# Patient Record
Sex: FEMALE | Race: WHITE | Employment: FULL TIME | URBAN - METROPOLITAN AREA
[De-identification: names, ages, dates, MRNs, and addresses within clinical notes are randomized per-mention and may not be internally consistent; named-entity substitution may affect disease eponyms.]

---

## 2017-02-09 ENCOUNTER — TELEPHONE (OUTPATIENT)
Dept: OBGYN | Facility: CLINIC | Age: 57
End: 2017-02-09

## 2017-02-09 ENCOUNTER — OFFICE VISIT (OUTPATIENT)
Dept: INTERNAL MEDICINE | Facility: CLINIC | Age: 57
End: 2017-02-09

## 2017-02-09 VITALS
BODY MASS INDEX: 37.81 KG/M2 | SYSTOLIC BLOOD PRESSURE: 125 MMHG | DIASTOLIC BLOOD PRESSURE: 81 MMHG | RESPIRATION RATE: 16 BRPM | WEIGHT: 213.4 LBS | HEART RATE: 72 BPM

## 2017-02-09 DIAGNOSIS — E03.4 HYPOTHYROIDISM DUE TO ACQUIRED ATROPHY OF THYROID: ICD-10-CM

## 2017-02-09 DIAGNOSIS — F43.21 ADJUSTMENT DISORDER WITH DEPRESSED MOOD: ICD-10-CM

## 2017-02-09 DIAGNOSIS — N95.2 VAGINAL ATROPHY: Primary | ICD-10-CM

## 2017-02-09 DIAGNOSIS — F41.0 PANIC: Primary | ICD-10-CM

## 2017-02-09 RX ORDER — ALPRAZOLAM 0.25 MG
0.25 TABLET ORAL 3 TIMES DAILY PRN
Qty: 10 TABLET | Refills: 0 | Status: SHIPPED | OUTPATIENT
Start: 2017-02-09 | End: 2018-04-05

## 2017-02-09 RX ORDER — LEVOTHYROXINE SODIUM 75 UG/1
75 TABLET ORAL DAILY
Qty: 90 TABLET | Refills: 3 | Status: SHIPPED | OUTPATIENT
Start: 2017-02-09 | End: 2017-02-16

## 2017-02-09 RX ORDER — SERTRALINE HYDROCHLORIDE 100 MG/1
100 TABLET, FILM COATED ORAL DAILY
Qty: 90 TABLET | Refills: 3 | Status: SHIPPED | OUTPATIENT
Start: 2017-02-09 | End: 2017-02-16

## 2017-02-09 ASSESSMENT — PAIN SCALES - GENERAL: PAINLEVEL: NO PAIN (0)

## 2017-02-09 NOTE — PROGRESS NOTES
Surgeon (please enter first and last name):  Dr Elkins  Fax number for Preop Evaluation:  [phone]  Location of Surgery: [facility]   Date of Surgery:  2/15/17  Procedure:  Left foot Surgery  History of reaction to anesthesia?  No

## 2017-02-09 NOTE — NURSING NOTE
Pre op physical report faxed to [facility] pre op fax line.Opal Patel LPN 10:37 AM on 2/9/2017

## 2017-02-09 NOTE — PATIENT INSTRUCTIONS
Primary Care Center Phone Number [phone]  Primary Care Center Medication Refill Request Information:  * Please contact your pharmacy regarding ANY request for medication refills.  ** [facility] Prescription Fax = [phone]  * Please allow 3 business days for routine medication refills.  * Please allow 5 business days for controlled substance medication refills.     Primary Care Center Test Result notification information:  *You will be notified with in 7-10 days of your appointment day regarding the results of your test.  If you are on MyChart you will be notified as soon as the provider has reviewed the results and signed off on them.

## 2017-02-09 NOTE — TELEPHONE ENCOUNTER
Telephone call from Sherrell requesting that when she saw Dr. Avalos last November she recommended that she start vaginal estrogen cream. She went to purchase this OTC but her insurance is needing a written prescription in order to cover this. She is requesting that Dr. Avalos order this and send it to [facility] on [facility], this message will be sent to Dr. Avlaos

## 2017-02-09 NOTE — MR AVS SNAPSHOT
After Visit Summary   2/9/2017    Sherrell Kothari    MRN: [id number]           Patient Information     Date Of Birth          1960        Visit Information        Provider Department      2/9/2017 8:25 AM Laurie Velasquez MD [facility]        Today's Diagnoses     Panic    -  1     Hypothyroidism due to acquired atrophy of thyroid         Adjustment disorder with depressed mood           Care Instructions    Primary Care Center Phone Number [phone]  Primary Care Center Medication Refill Request Information:  * Please contact your pharmacy regarding ANY request for medication refills.  ** [facility] Prescription Fax = [phone]  * Please allow 3 business days for routine medication refills.  * Please allow 5 business days for controlled substance medication refills.     Primary Care Center Test Result notification information:  *You will be notified with in 7-10 days of your appointment day regarding the results of your test.  If you are on MyChart you will be notified as soon as the provider has reviewed the results and signed off on them.                Follow-ups after your visit        Your next 10 appointments already scheduled     May 09, 2017  9:15 AM   (Arrive by 9:00 AM)   Return Visit with Sahil Ng MD   [facility] ([facility])    [address] [place] [id number] [phone]              Who to contact     Please call your clinic at [phone] to:    Ask questions about your health    Make or cancel appointments    Discuss your medicines    Learn about your test results    Speak to your doctor   If you have compliments or concerns about an experience at your clinic, or if you wish to file a complaint, please contact [facility] Patient Relations at [phone] or email us at [email or web address]         Additional Information About Your Visit         Orlebar Brownhart Information     [employer] gives you secure access to your electronic health record. If you see a primary care provider, you can also send messages to your care team and make appointments. If you have questions, please call your primary care clinic.  If you do not have a primary care provider, please call [phone] and they will assist you.      [employer] is an electronic gateway that provides easy, online access to your medical records. With [employer], you can request a clinic appointment, read your test results, renew a prescription or communicate with your care team.     To access your existing account, please contact your [facility] or call [phone] for assistance.        Care EveryWhere ID     This is your Care EveryWhere ID. This could be used by other organizations to access your [place] medical records  [id number]        Your Vitals Were     Pulse Respirations Last Period Breastfeeding?          72 16 04/01/2013 No         Blood Pressure from Last 3 Encounters:   02/09/17 125/81   11/29/16 127/84   10/22/16 141/94    Weight from Last 3 Encounters:   02/09/17 96.798 kg (213 lb 6.4 oz)   11/29/16 100.744 kg (222 lb 1.6 oz)   10/22/16 98.158 kg (216 lb 6.4 oz)              Today, you had the following     No orders found for display         Today's Medication Changes          These changes are accurate as of: 2/9/17  8:58 AM.  If you have any questions, ask your nurse or doctor.               Start taking these medicines.        Dose/Directions    ALPRAZolam 0.25 MG tablet   Commonly known as:  XANAX   Used for:  Panic   Started by:  Laurie Velasquez MD        Dose:  0.25 mg   Take 1 tablet (0.25 mg) by mouth 3 times daily as needed for anxiety   Quantity:  10 tablet   Refills:  0            Where to get your medicines      These medications were sent to [facility] [address]  [place] [address]     Phone:  [phone]    - levothyroxine 75 MCG tablet  - sertraline 100 MG tablet      Some of these will need a paper prescription and others can be bought over the counter.  Ask your nurse if you have questions.     Bring a paper prescription for each of these medications    - ALPRAZolam 0.25 MG tablet             Primary Care Provider Office Phone # Fax #    MANDA Ocasio [phone]       PRIMARY [facility]ARE CENTER [address] [place] [address] [address]  [facility] [address]        Thank you!     Thank you for choosing [facility]  for your care. Our goal is always to provide you with excellent care. Hearing back from our patients is one way we can continue to improve our services. Please take a few minutes to complete the written survey that you may receive in the mail after your visit with us. Thank you!             Your Updated Medication List - Protect others around you: Learn how to safely use, store and throw away your medicines at www.disposemymeds.org.          This list is accurate as of: 2/9/17  8:58 AM.  Always use your most recent med list.                   Brand Name Dispense Instructions for use    ALPRAZolam 0.25 MG tablet    XANAX    10 tablet    Take 1 tablet (0.25 mg) by mouth 3 times daily as needed for anxiety       fexofenadine 180 MG tablet    ALLEGRA     Take by mouth as needed       levothyroxine 75 MCG tablet    SYNTHROID/LEVOTHROID    90 tablet    Take 1 tablet (75 mcg) by mouth daily       MAGNESIUM PO      Take 1,000 mg by mouth daily       PRILOSEC OTC PO      Take by mouth as needed       rizatriptan 10 MG ODT tab    MAXALT-MLT    12 tablet    Take 1 tablet by mouth at onset of headache for migraine (Repeat at two hours if headache not entirely abated). May repeat dose in 2 hours.  Do not exceed 30 mg in 24 hours       sertraline 100 MG tablet    ZOLOFT    90 tablet    Take 1 tablet (100 mg) by mouth daily

## 2017-02-09 NOTE — NURSING NOTE
Chief Complaint   Patient presents with     Pre-Op Exam     pt is here for a pre op exam       Beverly Lutz CMA at 8:13 AM on 2/9/2017

## 2017-02-09 NOTE — PROGRESS NOTES
Sherrell Kothari is 56 year old female here at the request of Dr. Barroso for cardiovascular, pulmonary, and perioperative risk assessment prior to surgery.The intended surgical procedure is left foot surgery (hallux valgus repair). A copy of this note will be sent to the surgeon.    Past Medical History   Diagnosis Date     Diaphragmatic hernia without mention of obstruction or gangrene      hiatal hernia     Other forms of migraine, without mention of intractable migraine without mention of status migrainosus      intermittent, formerly treated with Zomig     Other chest pain 8/04     non-cardiac, cardiology,  nl stress test     Essential hypertension, benign      Mixed hyperlipidemia      Allergic rhinitis, cause unspecified      Allergic rhinitis     Unspecified hypothyroidism      Hypersomnia with sleep apnea, unspecified      CPAP     Depressive disorder, not elsewhere classified      Carcinoma in situ of skin of other and unspecified parts of face      squamous cell skin cancer upper lip     Helicobacter pylori (H. pylori)      TREATED     Syncope ER 12/09     NL echo, head CT     Temporomandibular disorder      Squamous cell carcinoma (H)      Mohs surgery      Past Surgical History   Procedure Laterality Date     Hc removal of tonsils,<10 y/o       Tonsils <11y.o.     Pelvis laparoscopy,dx  1996     Laparoscopy, benign fatty tumor LLQ     Surgical history of -   11/2005     Martin Blepharoplasty     Hc esophagoscopy, diagnostic       Hiatal Hernia     Surgical history of -   9/2006     Martin Breast Reduction     Hysteroscopy  5/09     with endometrial ablation     C echo heart xthoracic,complete, w/o doppler  12/09     EF 60%     Hc ct head wo contrast       Esophagoscopy, gastroscopy, duodenoscopy (egd), combined  5/23/2011     Procedure:COMBINED ESOPHAGOSCOPY, GASTROSCOPY, DUODENOSCOPY (EGD), BIOPSY SINGLE OR MULTIPLE; Surgeon:JOSE L FOOTE; Location: GI     Mohs micrographic procedure        Biopsy of skin lesion       Hc hysteroscopy, surgical; w/ endometrial ablation, any method N/A       Current Outpatient Prescriptions   Medication     ALPRAZolam (XANAX) 0.25 MG tablet     levothyroxine (SYNTHROID/LEVOTHROID) 75 MCG tablet     sertraline (ZOLOFT) 100 MG tablet     Omeprazole Magnesium (PRILOSEC OTC PO)     MAGNESIUM PO     fexofenadine (ALLEGRA) 180 MG tablet     rizatriptan (MAXALT-MLT) 10 MG disintegrating tablet     [DISCONTINUED] levothyroxine (SYNTHROID, LEVOTHROID) 75 MCG tablet     [DISCONTINUED] sertraline (ZOLOFT) 100 MG tablet     No current facility-administered medications for this visit.     Immunization History   Administered Date(s) Administered     DTAP (<7y) 10/08/1984, 09/09/1986     IPV 11/16/1987     Influenza (IIV3) 11/15/2005, 11/15/2007, 09/15/2014     TD (ADULT, 7+) 03/16/2005     TDAP (BOOSTRIX AGES 10-64) 04/02/2015       This is a LOW risk surgery.    HPI:   Reason for surgery: Abruptly started having left foot pain with activity and at rest in 12/2016. Pain is has been worsening. Notes that she wears comfortable shoes. Is she scheduled to have hallux valgus repair by a podiatrist.    Cardiovascular Risk:  She does not have any active cardiac conditions.  She does not have a history of known cardiac disease, prior MI, arrhythmia, CHF and hypertension and does not have a history of valvular disease.    This patient ambulates without assist without chest pain. She IS able to climb a flight of stairs without chest pain.    Pulmonary Risk:  In terms of risk factors for pulmonary complications, the patient does not have a history of CHF, COPD, age >60 years, being functionally dependent.      Is this patient going to undergo any of the following procedures that would put her at higher risk of postoperative pulmonary complications:  Prolonged surgery (>3 hours): No  Abdominal surgery/thoracic surgery/neurosurgery/head and neck: Yes   Vascular/aortic aneurysm repair: No  General  "anesthesia: Possibly    Perioperative Complications:  The patient does not have a history of bleeding or clotting problems in the past. The patient has not had complications from past surgeries.  The patient does not have a family history of any anesthesia or surgical complications.    Social History     Social History     Marital Status:      Spouse Name: Jaziel     Number of Children: 0     Years of Education: N/A     Occupational History     Ad. Ass[employer]            Social History Main Topics     Smoking status: Former Smoker -- 0.20 packs/day for 5 years     Types: Cigarettes     Smokeless tobacco: Never Used      Comment: Quit at age 17, smoked from age [age]     Alcohol Use: 0.0 oz/week     0 Standard drinks or equivalent per week      Comment: 1-2 drinks a week     Drug Use: No     Sexual Activity:     Partners: Male      Comment:  Jaziel     Other Topics Concern      Service No     Caffeine Concern No     Occupational Exposure No     Hobby Hazards No     Sleep Concern No     Stress Concern No     Weight Concern Yes     tries to \"eat right\", finds it hard with stress of job     Special Diet No     Back Care No     Exercise Yes     working on increasing aerobic activity, walks daily     Bike Helmet Yes     Seat Belt Yes     Self-Exams No     Social History Narrative     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        Health Care Maintenance:    Last Pap:2008 NIL    Vaccinations:up to date    TSH:1/2012 normal    Fasting glucose:elevated (104) in 2010    Lipids:1/2012 normal    Mammogram:4/2012 Bi-rads one    Colonoscopy:? Unsure, believes she is due    Dexa:n/a                            Family History   Problem Relation Age of Onset     C.A.D. Mother      mild heart attack 59     Colon Polyps Mother      Thyroid Disease Mother      DIABETES Maternal Grandmother      Cancer - colorectal Maternal Grandmother      Thyroid Disease Maternal Grandmother      Colon Cancer Maternal Grandmother      " Skin Cancer Maternal Grandmother      Alcohol/Drug Father      Blood Disease Maternal Grandfather       leukemia     Cancer - colorectal Brother 49          Crohn Disease No family hx of      Ulcerative Colitis No family hx of      Anesthesia Reaction No family hx of      Melanoma No family hx of      Myocardial Infarction Maternal Aunt 60     Myocardial Infarction Maternal Uncle 70     Colon Cancer Other      half brother        Review Of Systems  Constitutional: Negative  Eyes: negative  Ears/Nose/Throat: negative  Cardiovascular: negative  Respiratory: No shortness of breath, dyspnea on exertion, cough, or hemoptysis  Gastrointestinal: negative  Genitourinary: negative  Musculoskeletal: joint pain - left knee pain and left foot pain  Skin: negative  Neurologic: negative   Endocrine: thyroid disorder - on synthroid  Hematologic/Lymphatic/Immunologic: negative  Psychiatric: feeling anxious and anxiety, no SI or HI        OBJECTIVE:  /81 mmHg  Pulse 72  Resp 16  Wt 96.798 kg (213 lb 6.4 oz)  LMP 2013  Breastfeeding? No  Body mass index is 37.81 kg/(m^2).   Gen: Well-developed, well-nourished and in no apparent distress  HEENT:  Normocephalic, atraumatic, PERRL, no scleral icterus, no nasal discharge,  Nasal turbinates normal, OP nonerythematous, no oral lesions, MMM.  Cranial nerves II-XII grossly intact.  Neck: supple, no LAD, no thyromegaly  CV: regular rate and rhythm, normal S1 S2 and no murmur, click, or rub  Resp: clear to ausculation bilaterally, normal respiratory effort  Abd: bowel sounds normal, soft NT/ND,  no masses or hepatosplenomegaly  Ext: warm.  No LE edema.  Skin: dry and warm  Psych: normal mood/affect, appropriately oriented      A/P:    The patient with   Past Medical History   Diagnosis Date     Diaphragmatic hernia without mention of obstruction or gangrene      hiatal hernia     Other forms of migraine, without mention of intractable migraine without mention of  status migrainosus      intermittent, formerly treated with Zomig     Other chest pain 8/04     non-cardiac, cardiology,  nl stress test     Essential hypertension, benign      Mixed hyperlipidemia      Allergic rhinitis, cause unspecified      Allergic rhinitis     Unspecified hypothyroidism      Hypersomnia with sleep apnea, unspecified      CPAP     Depressive disorder, not elsewhere classified      Carcinoma in situ of skin of other and unspecified parts of face      squamous cell skin cancer upper lip     Helicobacter pylori (H. pylori)      TREATED     Syncope ER 12/09     NL echo, head CT     Temporomandibular disorder      Squamous cell carcinoma (H)      Mohs surgery    presents prior to surgery for assessment of perioperative risk. The patient is at LOW risk for cardiovascular complications and at LOW risk for pulmonary complications of this LOW risk surgery.  She does not require further testing.    --Approval given to proceed with proposed procedure, without further diagnostic evaluation  --Patient is to take all other scheduled medications on the day of surgery     No evidence of functionally compromising cardiac or pulmonary status  The patient is recommended to hold aspirin or NSAIDS for 10 days prior to surgery.  The patient is instructed as to which medications to take with sips of water the morning of surgery    GENERAL PREOP INSTRUCTIONS:  Proceed with surgery as planned.  Call surgeon if develops respiratory illness, fever, or other illness.  Letter sent to requesting surgeon listed above      Laboratory studies:  None  Pregnancy testing was not indicated.    Stress Echo 1/8/15:  No stress induced regional wall motion abnormalities.  Normal resting LV function with EF of approximately 60-65%; normal response   to exercise with increase to approximately 70-75%.  No ECG evidence of ischemia.  No subjective evidence of ischemia.  Normal functional capacity.  No significant valvular disease noted on  routine screening color flow Doppler   and pulsed Doppler examination.    Other orders:  Anxiety  -     Prescribed 10 tabs of ALPRAZolam (XANAX) 0.25 MG tablet; Take 1 tablet (0.25 mg) by mouth 3 times daily as needed for anxiety    Hypothyroidism due to acquired atrophy of thyroid  -     Refilled levothyroxine (SYNTHROID/LEVOTHROID) 75 MCG tablet; Take 1 tablet (75 mcg) by mouth daily    Adjustment disorder with depressed mood  -     Refilled sertraline (ZOLOFT) 100 MG tablet; Take 1 tablet (100 mg) by mouth daily      Patient was seen and discussed with Dr. Brumfield.    Please contact our office if there are any further questions or information required about this patient.    Laurie Velasquez MD  PGY-1 Internal Medicine  Pager: [phone]

## 2017-02-10 RX ORDER — ESTRADIOL 0.1 MG/G
2 CREAM VAGINAL
Qty: 42.5 G | Refills: 3 | Status: SHIPPED | OUTPATIENT
Start: 2017-02-13 | End: 2017-02-16

## 2017-02-11 NOTE — PROGRESS NOTES
Attestation:  I, Roya Snider, saw this patient with the resident and agree with the resident s findings and plan of care as documented in the resident s note.      Roya Snider MD

## 2017-02-13 ENCOUNTER — TELEPHONE (OUTPATIENT)
Dept: OBGYN | Facility: CLINIC | Age: 57
End: 2017-02-13

## 2017-02-13 DIAGNOSIS — N95.2 VAGINAL ATROPHY: ICD-10-CM

## 2017-02-16 ENCOUNTER — TELEPHONE (OUTPATIENT)
Dept: INTERNAL MEDICINE | Facility: CLINIC | Age: 57
End: 2017-02-16

## 2017-02-16 DIAGNOSIS — F43.21 ADJUSTMENT DISORDER WITH DEPRESSED MOOD: ICD-10-CM

## 2017-02-16 DIAGNOSIS — E03.4 HYPOTHYROIDISM DUE TO ACQUIRED ATROPHY OF THYROID: ICD-10-CM

## 2017-02-16 RX ORDER — ESTRADIOL 0.1 MG/G
2 CREAM VAGINAL
Qty: 42.5 G | Refills: 3 | Status: SHIPPED | OUTPATIENT
Start: 2017-02-16 | End: 2017-05-09

## 2017-02-16 RX ORDER — LEVOTHYROXINE SODIUM 75 UG/1
75 TABLET ORAL DAILY
Qty: 90 TABLET | Refills: 3 | Status: SHIPPED | OUTPATIENT
Start: 2017-02-16 | End: 2018-03-06

## 2017-02-16 RX ORDER — SERTRALINE HYDROCHLORIDE 100 MG/1
100 TABLET, FILM COATED ORAL DAILY
Qty: 90 TABLET | Refills: 3 | Status: SHIPPED | OUTPATIENT
Start: 2017-02-16 | End: 2018-03-06

## 2017-02-16 NOTE — TELEPHONE ENCOUNTER
Patient needs the prescriptions that were written last week for levothyroxine and sertraline sent to [facility] in [employer] on [address] in [place] for insurance reasons. Prescriptions sent per her request. LM at the [facility] cancelling the prescriptions that had been sent there.

## 2017-05-09 ENCOUNTER — OFFICE VISIT (OUTPATIENT)
Dept: DERMATOLOGY | Facility: CLINIC | Age: 57
End: 2017-05-09

## 2017-05-09 DIAGNOSIS — L82.0 SEBORRHEIC KERATOSES, INFLAMED: ICD-10-CM

## 2017-05-09 DIAGNOSIS — N95.2 VAGINAL ATROPHY: ICD-10-CM

## 2017-05-09 DIAGNOSIS — D48.5 NEOPLASM OF UNCERTAIN BEHAVIOR OF SKIN: Primary | ICD-10-CM

## 2017-05-09 DIAGNOSIS — Z85.89 HISTORY OF SQUAMOUS CELL CARCINOMA: ICD-10-CM

## 2017-05-09 RX ORDER — LIDOCAINE HYDROCHLORIDE AND EPINEPHRINE 10; 10 MG/ML; UG/ML
3 INJECTION, SOLUTION INFILTRATION; PERINEURAL ONCE
Qty: 3 ML | Refills: 0 | OUTPATIENT
Start: 2017-05-09 | End: 2017-05-09

## 2017-05-09 RX ORDER — ESTRADIOL 0.1 MG/G
2 CREAM VAGINAL
Qty: 42.5 G | Refills: 3 | Status: SHIPPED | OUTPATIENT
Start: 2017-05-11 | End: 2018-03-07

## 2017-05-09 ASSESSMENT — PAIN SCALES - GENERAL
PAINLEVEL: NO PAIN (0)
PAINLEVEL: NO PAIN (0)

## 2017-05-09 NOTE — NURSING NOTE
"Dermatology Rooming Note    Sherrell Kothari's goals for this visit include:   Chief Complaint   Patient presents with     Derm Problem     Sherrell is here today for a skin check.  States she has some new \"bumps\" on her back.         Amelia Terrazas, GRAHAM  "

## 2017-05-09 NOTE — TELEPHONE ENCOUNTER
Received refill request for estrace cream. Patient recently saw Dr. Avalos in November for annual. Able to send refill.

## 2017-05-09 NOTE — MR AVS SNAPSHOT
After Visit Summary   5/9/2017    Sherrell Kothari    MRN: [id number]           Patient Information     Date Of Birth          1960        Visit Information        Provider Department      5/9/2017 9:15 AM Sahil Ng MD [facility] Dermatology        Today's Diagnoses     Neoplasm of uncertain behavior of skin    -  1      Care Instructions    Cryotherapy    What is it?    Use of a very cold liquid, such as liquid nitrogen, to freeze and destroy abnormal skin cells that need to be removed    What should I expect?    Tenderness and redness    A small blister that might grow and fill with dark purple blood. There may be crusting.    More than one treatment may be needed if the lesions do not go away.    How do I care for the treated area?    Gently wash the area with your hands when bathing.    Use a thin layer of Vaseline to help with healing. You may use a Band-Aid.     The area should heal within 7-10 days and may leave behind a pink or lighter color.     Do not use an antibiotic or Neosporin ointment.     You may take acetaminophen (Tylenol) for pain.     Call your Doctor if you have:    Severe pain    Signs of infection (warmth, redness, cloudy yellow drainage, and or a bad smell)    Questions or concerns    Who should I call with questions?       [facility]: [phone]       [facility]: [phone]       For urgent needs outside of business hours call the [facility] at [phone]        and ask for the dermatology resident on call  Wound Care After a Biopsy    What is a skin biopsy?  A skin biopsy allows the doctor to examine a very small piece of tissue under the microscope to determine the diagnosis and the best treatment for the skin condition. A local anesthetic (numbing medicine)  is injected with a very small needle into the skin area to be tested. A small piece of skin is taken from the area.  Sometimes a suture (stitch) is used.     What are the risks of a skin biopsy?  I will experience scar, bleeding, swelling, pain, crusting and redness. I may experience incomplete removal or recurrence. Risks of this procedure are excessive bleeding, bruising, infection, nerve damage, numbness, thick (hypertrophic or keloidal) scar and non-diagnostic biopsy.    How should I care for my wound for the first 24 hours?    Keep the wound dry and covered for 24 hours    If it bleeds, hold direct pressure on the area for 15 minutes. If bleeding does not stop then go to the emergency room    Avoid strenuous exercise the first 1-2 days or as your doctor instructs you    How should I care for the wound after 24 hours?    After 24 hours, remove the bandage    You may bathe or shower as normal    If you had a scalp biopsy, you can shampoo as usual and can use shower water to clean the biopsy site daily    Clean the wound twice a day with gentle soap and water    Do not scrub, be gentle    Apply white petroleum/Vaseline after cleaning the wound with a cotton swab or a clean finger, and keep the site covered with a Bandaid /bandage. Bandages are not necessary with a scalp biopsy    If you are unable to cover the site with a Bandaid /bandage, re-apply ointment 2-3 times a day to keep the site moist. Moisture will help with healing    Avoid strenuous activity for first 1-2 days    Avoid lakes, rivers, pools, and oceans until the stitches are removed or the site is healed    How do I clean my wound?    Wash hands for 15 minutes with soap or use hand  before all wound care    Clean the wound with gentle soap and water    Apply white petroleum/Vaseline  to wound after it is clean    Replace the Bandaid /bandage to keep the wound covered for the first few days or as instructed by your doctor    If you had a scalp biopsy, warm shower water to the area on a daily basis should suffice    What should I use to clean my wound?      Cotton-tipped applicators (Qtips )    White petroleum jelly (Vaseline ). Use a clean new container and use Q-tips to apply.    Bandaids   as needed    Gentle soap     How should I care for my wound long term?    Do not get your wound dirty    Keep up with wound care for one week or until the area is healed.    A small scab will form and fall off by itself when the area is completely healed. The area will be red and will become pink in color as it heals. Sun protection is very important for how your scar will turn out. Sunscreen with an SPF 30 or greater is recommended once the area is healed.    If you have stitches, stitches need to be removed in  days. You may return to our clinic for this or you may have it done locally at your doctor s office.    You should have some soreness but it should be mild and slowly go away over several days. Talk to your doctor about using tylenol for pain,    When should I call my doctor?  If you have increased:     Pain or swelling    Pus or drainage (clear or slightly yellow drainage is ok)    Temperature over 100F    Spreading redness or warmth around wound    When will I hear about my results?  The biopsy results can take 2-3 weeks to come back. The clinic will call you with the results, send you a [employer] message, or have you schedule a follow-up clinic or phone time to discuss the results. Contact our clinics if you do not hear from us in 3 weeks.     Who should I call with questions?    [facility]: [phone]     [facility]: [phone]    For urgent needs outside of business hours call the [facility] at [phone] and ask for the dermatology resident on call            Follow-ups after your visit        Follow-up notes from your care team     Return in about 6 months (around 11/9/2017).      Who to contact     Please call your clinic at [phone] to:    Ask questions about your health    Make or  cancel appointments    Discuss your medicines    Learn about your test results    Speak to your doctor   If you have compliments or concerns about an experience at your clinic, or if you wish to file a complaint, please contact [facility] Patient Relations at [phone] or email us at [email or web address]         Additional Information About Your Visit        [employer] Information     [employer] gives you secure access to your electronic health record. If you see a primary care provider, you can also send messages to your care team and make appointments. If you have questions, please call your primary care clinic.  If you do not have a primary care provider, please call [phone] and they will assist you.      [employer] is an electronic gateway that provides easy, online access to your medical records. With [employer], you can request a clinic appointment, read your test results, renew a prescription or communicate with your care team.     To access your existing account, please contact your [facility] or call [phone] for assistance.        Care EveryWhere ID     This is your Care EveryWhere ID. This could be used by other organizations to access your [facility] medical records  [id number]        Your Vitals Were     Last Period                   04/01/2013            Blood Pressure from Last 3 Encounters:   02/09/17 125/81   11/29/16 127/84   10/22/16 (!) 141/94    Weight from Last 3 Encounters:   02/09/17 96.8 kg (213 lb 6.4 oz)   11/29/16 100.7 kg (222 lb 1.6 oz)   10/22/16 98.2 kg (216 lb 6.4 oz)              We Performed the Following     BIOPSY SKIN/SUBQ/MUC MEM, EACH ADDTL LESION     BIOPSY SKIN/SUBQ/MUC MEM, SINGLE LESION     Surgical pathology exam          Today's Medication Changes          These changes are accurate as of: 5/9/17 10:13 AM.  If you have any questions, ask your nurse or doctor.               Start taking these medicines.         Dose/Directions    lidocaine 1% with EPINEPHrine 1:100,000 1 %-1:622897 injection   Used for:  Neoplasm of uncertain behavior of skin   Started by:  Sahil Ng MD        Dose:  3 mL   Inject 3 mLs into the skin once for 1 dose   Quantity:  3 mL   Refills:  0            Where to get your medicines      Some of these will need a paper prescription and others can be bought over the counter.  Ask your nurse if you have questions.     You don't need a prescription for these medications     lidocaine 1% with EPINEPHrine 1:100,000 1 %-1:002661 injection                Primary Care Provider Office Phone # Fax #    MANDA Ocasio [phone]       [facility] [address]  [facility] [address]        Thank you!     Thank you for choosing [facility] DERMATOLOGY  for your care. Our goal is always to provide you with excellent care. Hearing back from our patients is one way we can continue to improve our services. Please take a few minutes to complete the written survey that you may receive in the mail after your visit with us. Thank you!             Your Updated Medication List - Protect others around you: Learn how to safely use, store and throw away your medicines at www.disposemymeds.org.          This list is accurate as of: 5/9/17 10:13 AM.  Always use your most recent med list.                   Brand Name Dispense Instructions for use    ALPRAZolam 0.25 MG tablet    XANAX    10 tablet    Take 1 tablet (0.25 mg) by mouth 3 times daily as needed for anxiety       CLARITIN PO      Take by mouth daily       estradiol 0.1 MG/GM cream    ESTRACE VAGINAL    42.5 g    Place 2 g vaginally twice a week       fexofenadine 180 MG tablet    ALLEGRA     Take by mouth as needed Reported on 5/9/2017       levothyroxine 75 MCG tablet    SYNTHROID/LEVOTHROID    90 tablet    Take 1 tablet (75 mcg) by mouth daily       lidocaine 1% with EPINEPHrine 1:100,000 1 %-1:391577 injection      3 mL    Inject 3 mLs into the skin once for 1 dose       MAGNESIUM PO      Take 1,000 mg by mouth daily       PRILOSEC OTC PO      Take by mouth as needed       rizatriptan 10 MG ODT tab    MAXALT-MLT    12 tablet    Take 1 tablet by mouth at onset of headache for migraine (Repeat at two hours if headache not entirely abated). May repeat dose in 2 hours.  Do not exceed 30 mg in 24 hours       sertraline 100 MG tablet    ZOLOFT    90 tablet    Take 1 tablet (100 mg) by mouth daily

## 2017-05-09 NOTE — PROGRESS NOTES
"[facility] Dermatology Note  May 9, 2017    Dermatology Problem List:  1. Dysplastic nevi  -s/p excision    2. SCC, lip  -Removed in mid-2000's    2. Irritated Seborrheic Keratosis  -Cryotherapy    4. Hypertrophic scars  -s/p laser    5. Xerosis cutis  -Amlactin    Encounter Date: May 9, 2017    CC:   Chief Complaint   Patient presents with     Derm Problem     Sherrell is here today for a skin check.  States she has some new \"bumps\" on her back.         History of Present Illness:  Ms. Sherrell Kothari is a 56 year old female who presents as a follow-up for a skin cancer exam. The patient was last seen in 11/2016 for a skin cancer exam.     Got back from a trip in [place] just on 5/7/17. All day sun exposure. Would place SPF 50 sun screen. She notes that since she was last seen in November, there is one irritated spot on her left shoulder. Feels itchy and irritated. No bleeding.     Otherwise, the pt denies any new or changing skin lesions, and denies any other itching, bleeding, or painful skin lesions. Otherwise the pt denies any other skin complaints today.    Past Medical History:   Patient Active Problem List   Diagnosis     Dyspnea and respiratory abnormality     Chest pain     Pure hypercholesterolemia     Chondromalacia of patella     Mixed hyperlipidemia     Allergic rhinitis     Other type of migraine     Tubulovillous adenoma colon      Dizziness and giddiness     Anxiety state     Major depressive disorder, recurrent episode, in partial or unspecified remission     Bruxism     Neoplasm of uncertain behavior of skin     Xerosis cutis     History of skin cancer     Hypothyroidism     Obesity     Abnormal glucose     Obstructive sleep apnea     Chronic nasal congestion     Hypothyroidism due to acquired atrophy of thyroid     Seborrheic keratoses, inflamed     Cherry angioma     Past Medical History:   Diagnosis Date     Allergic rhinitis, cause unspecified     Allergic rhinitis "     Carcinoma in situ of skin of other and unspecified parts of face     squamous cell skin cancer upper lip     Depressive disorder, not elsewhere classified      Diaphragmatic hernia without mention of obstruction or gangrene     hiatal hernia     Essential hypertension, benign      Helicobacter pylori (H. pylori)     TREATED     Hypersomnia with sleep apnea, unspecified     CPAP     Mixed hyperlipidemia      Other chest pain 8/04    non-cardiac, cardiology,  nl stress test     Other forms of migraine, without mention of intractable migraine without mention of status migrainosus     intermittent, formerly treated with Zomig     Squamous cell carcinoma (H)     Mohs surgery     Syncope ER 12/09    NL echo, head CT     Temporomandibular disorder      Unspecified hypothyroidism      Past Surgical History:   Procedure Laterality Date     BIOPSY OF SKIN LESION       C ECHO HEART XTHORACIC,COMPLETE, W/O DOPPLER  12/09    EF 60%     ESOPHAGOSCOPY, GASTROSCOPY, DUODENOSCOPY (EGD), COMBINED  5/23/2011    Procedure:COMBINED ESOPHAGOSCOPY, GASTROSCOPY, DUODENOSCOPY (EGD), BIOPSY SINGLE OR MULTIPLE; Surgeon:JOSE L FOOTE; Location:[facility]     [facility] CT HEAD WO CONTRAST       HC ESOPHAGOSCOPY, DIAGNOSTIC      Hiatal Hernia     HC HYSTEROSCOPY, SURGICAL; W/ ENDOMETRIAL ABLATION, ANY METHOD N/A      HC REMOVAL OF TONSILS,<12 Y/O      Tonsils <11y.o.     HYSTEROSCOPY  5/09    with endometrial ablation     MOHS MICROGRAPHIC PROCEDURE       PELVIS LAPAROSCOPY,DX  1996    Laparoscopy, benign fatty tumor LLQ     SURGICAL HISTORY OF -   11/2005    [facility] Blepharoplasty     SURGICAL HISTORY OF -   9/2006    [facility] Breast Reduction     Medications:  Current Outpatient Prescriptions   Medication Sig Dispense Refill     Loratadine (CLARITIN PO) Take by mouth daily       lidocaine 1% with EPINEPHrine 1:100,000 1 %-1:235184 injection Inject 3 mLs into the skin once for 1 dose 3 mL 0     levothyroxine (SYNTHROID/LEVOTHROID) 75 MCG tablet Take 1  tablet (75 mcg) by mouth daily 90 tablet 3     sertraline (ZOLOFT) 100 MG tablet Take 1 tablet (100 mg) by mouth daily 90 tablet 3     estradiol (ESTRACE VAGINAL) 0.1 MG/GM cream Place 2 g vaginally twice a week 42.5 g 3     Omeprazole Magnesium (PRILOSEC OTC PO) Take by mouth as needed        MAGNESIUM PO Take 1,000 mg by mouth daily       rizatriptan (MAXALT-MLT) 10 MG disintegrating tablet Take 1 tablet by mouth at onset of headache for migraine (Repeat at two hours if headache not entirely abated). May repeat dose in 2 hours.  Do not exceed 30 mg in 24 hours 12 tablet 6     ALPRAZolam (XANAX) 0.25 MG tablet Take 1 tablet (0.25 mg) by mouth 3 times daily as needed for anxiety (Patient not taking: Reported on 5/9/2017) 10 tablet 0     fexofenadine (ALLEGRA) 180 MG tablet Take by mouth as needed Reported on 5/9/2017        Allergies   Allergen Reactions     Latex Swelling and Rash     Issues with Latex Condoms in the past     Dust Mites      Mold      Pollen Extract      Sulfa Drugs      Rash, hives       Review of Systems:  -As per HPI  -Constitutional: The patient denies fatigue, fevers, chills, unintended weight loss, and night sweats.  -HEENT: Patient denies non-healing oral sores.  -Skin: As above in HPI. No additional skin concerns.    Physical exam:  GEN: This is a well developed, well-nourished female in no acute distress, in a pleasant mood.    SKIN: Total skin excluding the undergarment areas was performed. The exam included the head/face, neck, both arms, chest, back, abdomen, both legs, digits and/or nails.   -There are waxy stuck on tan to brown papules on the chest, back and legs, one of which on the left shoulder was irritated (these represent the lesions the pt was concerned about).  -New raised red lesion on upper right breast.  -There is xerosis of the arms and lower legs.   -There are bright red dome shaped papules scattered on the back and chest with some adjacent telangiectasias, two of which  on the L shoulder and midback demonstrate erythematous halos and coarse vessels on dermoscopy  -hyper pigmented lesion between second and third toe on right foot.  -No other lesions of concern on areas examined.     Impression/Plan:  1. Seborrheic keratosis, inflamed  - Cryotherapy procedure note: After verbal consent and discussion of risks and benefits including but no limited to dyspigmentation/scar, blister, and pain, 1 was treated with 1-2mm freeze border for 9-10 cycles with liquid nitrogen (right breast). Post cryotherapy instructions were provided.     2. Neoplasms of uncertain behavior on left shoulder and midback: BCCs vs inflamed SKs x2.  - Shave Biopsy procedure Note: Procedure Note: Biopsy by shave technique  The risks and benefits of the procedure were described to the patient. These include but are not limited to bleeding, infection, scar, incomplete removal, and non-diagnostic biopsy. Written informed consent was obtained. The left arm, mid-back lesions were cleansed with an alcohol pad and injected with 1% lidocaine with epinephrine buffered with sodium bicarbonate. Once anesthesia was obtained, a biopsy was performed with Gilette blade. The tissue was placed in a labeled container with formalin and sent to pathology. Hemostasis was achieved with aluminum chloride. Vaseline and a bandage were applied to the wound. The patient tolerated the procedure well and was given post biopsy care instructions.    2. Xerosis cutis  - Eucerin or Aquaphor as needed    3. Cherry angioma(s)  - Benign nature was discussed. No further intervention required at this time.     4.   Polymorphous Light Eruption: recent sun exposure. Noted to have erythematous clusters of papules on arms that the patient reports is now resolving.  - will reexamine in 6 months to determine if these resolve  - educated on responsible sun exposure.    5. History of SCC - clinically JOSEPH today.  Reassurance; sun protective measures  discussed.    Follow-up in 6 months, earlier for new or changing lesions.     Peter Garcia MD  Medicine-Pediatrics PGY2    I have seen and examined this patient and agree with the assessment and plan as documented in the resident's note, and was present for all pertinent procedures.    Sahil Ng MD  Dermatology Attending

## 2017-05-09 NOTE — PATIENT INSTRUCTIONS
Cryotherapy    What is it?    Use of a very cold liquid, such as liquid nitrogen, to freeze and destroy abnormal skin cells that need to be removed    What should I expect?    Tenderness and redness    A small blister that might grow and fill with dark purple blood. There may be crusting.    More than one treatment may be needed if the lesions do not go away.    How do I care for the treated area?    Gently wash the area with your hands when bathing.    Use a thin layer of Vaseline to help with healing. You may use a Band-Aid.     The area should heal within 7-10 days and may leave behind a pink or lighter color.     Do not use an antibiotic or Neosporin ointment.     You may take acetaminophen (Tylenol) for pain.     Call your Doctor if you have:    Severe pain    Signs of infection (warmth, redness, cloudy yellow drainage, and or a bad smell)    Questions or concerns    Who should I call with questions?       [facility]: [phone]       [facility]: [phone]       For urgent needs outside of business hours call the [facility] at [phone]        and ask for the dermatology resident on call  Wound Care After a Biopsy    What is a skin biopsy?  A skin biopsy allows the doctor to examine a very small piece of tissue under the microscope to determine the diagnosis and the best treatment for the skin condition. A local anesthetic (numbing medicine)  is injected with a very small needle into the skin area to be tested. A small piece of skin is taken from the area. Sometimes a suture (stitch) is used.     What are the risks of a skin biopsy?  I will experience scar, bleeding, swelling, pain, crusting and redness. I may experience incomplete removal or recurrence. Risks of this procedure are excessive bleeding, bruising, infection, nerve damage, numbness, thick (hypertrophic or keloidal) scar and non-diagnostic biopsy.    How should I care for  my wound for the first 24 hours?    Keep the wound dry and covered for 24 hours    If it bleeds, hold direct pressure on the area for 15 minutes. If bleeding does not stop then go to the emergency room    Avoid strenuous exercise the first 1-2 days or as your doctor instructs you    How should I care for the wound after 24 hours?    After 24 hours, remove the bandage    You may bathe or shower as normal    If you had a scalp biopsy, you can shampoo as usual and can use shower water to clean the biopsy site daily    Clean the wound twice a day with gentle soap and water    Do not scrub, be gentle    Apply white petroleum/Vaseline after cleaning the wound with a cotton swab or a clean finger, and keep the site covered with a Bandaid /bandage. Bandages are not necessary with a scalp biopsy    If you are unable to cover the site with a Bandaid /bandage, re-apply ointment 2-3 times a day to keep the site moist. Moisture will help with healing    Avoid strenuous activity for first 1-2 days    Avoid lakes, rivers, pools, and oceans until the stitches are removed or the site is healed    How do I clean my wound?    Wash hands for 15 minutes with soap or use hand  before all wound care    Clean the wound with gentle soap and water    Apply white petroleum/Vaseline  to wound after it is clean    Replace the Bandaid /bandage to keep the wound covered for the first few days or as instructed by your doctor    If you had a scalp biopsy, warm shower water to the area on a daily basis should suffice    What should I use to clean my wound?     Cotton-tipped applicators (Qtips )    White petroleum jelly (Vaseline ). Use a clean new container and use Q-tips to apply.    Bandaids   as needed    Gentle soap     How should I care for my wound long term?    Do not get your wound dirty    Keep up with wound care for one week or until the area is healed.    A small scab will form and fall off by itself when the area is completely  healed. The area will be red and will become pink in color as it heals. Sun protection is very important for how your scar will turn out. Sunscreen with an SPF 30 or greater is recommended once the area is healed.    If you have stitches, stitches need to be removed in  days. You may return to our clinic for this or you may have it done locally at your doctor s office.    You should have some soreness but it should be mild and slowly go away over several days. Talk to your doctor about using tylenol for pain,    When should I call my doctor?  If you have increased:     Pain or swelling    Pus or drainage (clear or slightly yellow drainage is ok)    Temperature over 100F    Spreading redness or warmth around wound    When will I hear about my results?  The biopsy results can take 2-3 weeks to come back. The clinic will call you with the results, send you a [employer] message, or have you schedule a follow-up clinic or phone time to discuss the results. Contact our clinics if you do not hear from us in 3 weeks.     Who should I call with questions?    [facility]: [phone]     [facility]: [phone]    For urgent needs outside of business hours call the [facility] at [phone] and ask for the dermatology resident on call

## 2017-05-09 NOTE — LETTER
"5/9/2017       RE: Sherrell Kothari  [address] [facility] [address]     Dear Colleague,    Thank you for referring your patient, Sherrell Kothari, to the [facility] DERMATOLOGY at [facility]. Please see a copy of my visit note below.                  Pictures were placed in Pt's chart today for future reference.      [facility] Dermatology Note  May 9, 2017    Dermatology Problem List:  1. Dysplastic nevi  -s/p excision    2. SCC, lip  -Removed in mid-2000's    2. Irritated Seborrheic Keratosis  -Cryotherapy    4. Hypertrophic scars  -s/p laser    5. Xerosis cutis  -Amlactin    Encounter Date: May 9, 2017    CC:   Chief Complaint   Patient presents with     Derm Problem     Sherrell is here today for a skin check.  States she has some new \"bumps\" on her back.         History of Present Illness:  Ms. Sherrell Kothari is a 56 year old female who presents as a follow-up for a skin cancer exam. The patient was last seen in 11/2016 for a skin cancer exam.     Got back from a trip in [place] just on 5/7/17. All day sun exposure. Would place SPF 50 sun screen. She notes that since she was last seen in November, there is one irritated spot on her left shoulder. Feels itchy and irritated. No bleeding.     Otherwise, the pt denies any new or changing skin lesions, and denies any other itching, bleeding, or painful skin lesions. Otherwise the pt denies any other skin complaints today.    Past Medical History:   Patient Active Problem List   Diagnosis     Dyspnea and respiratory abnormality     Chest pain     Pure hypercholesterolemia     Chondromalacia of patella     Mixed hyperlipidemia     Allergic rhinitis     Other type of migraine     Tubulovillous adenoma colon      Dizziness and giddiness     Anxiety state     Major depressive disorder, recurrent episode, in partial or unspecified remission     Bruxism     Neoplasm of uncertain " behavior of skin     Xerosis cutis     History of skin cancer     Hypothyroidism     Obesity     Abnormal glucose     Obstructive sleep apnea     Chronic nasal congestion     Hypothyroidism due to acquired atrophy of thyroid     Seborrheic keratoses, inflamed     Cherry angioma     Past Medical History:   Diagnosis Date     Allergic rhinitis, cause unspecified     Allergic rhinitis     Carcinoma in situ of skin of other and unspecified parts of face     squamous cell skin cancer upper lip     Depressive disorder, not elsewhere classified      Diaphragmatic hernia without mention of obstruction or gangrene     hiatal hernia     Essential hypertension, benign      Helicobacter pylori (H. pylori)     TREATED     Hypersomnia with sleep apnea, unspecified     CPAP     Mixed hyperlipidemia      Other chest pain 8/04    non-cardiac, cardiology,  nl stress test     Other forms of migraine, without mention of intractable migraine without mention of status migrainosus     intermittent, formerly treated with Zomig     Squamous cell carcinoma (H)     Mohs surgery     Syncope ER 12/09    NL echo, head CT     Temporomandibular disorder      Unspecified hypothyroidism      Past Surgical History:   Procedure Laterality Date     BIOPSY OF SKIN LESION       C ECHO HEART XTHORACIC,COMPLETE, W/O DOPPLER  12/09    EF 60%     ESOPHAGOSCOPY, GASTROSCOPY, DUODENOSCOPY (EGD), COMBINED  5/23/2011    Procedure:COMBINED ESOPHAGOSCOPY, GASTROSCOPY, DUODENOSCOPY (EGD), BIOPSY SINGLE OR MULTIPLE; Surgeon:JOSE L FOOTE; Location:[facility]     HC CT HEAD WO CONTRAST       HC ESOPHAGOSCOPY, DIAGNOSTIC      Hiatal Hernia     HC HYSTEROSCOPY, SURGICAL; W/ ENDOMETRIAL ABLATION, ANY METHOD N/A      HC REMOVAL OF TONSILS,<12 Y/O      Tonsils <11y.o.     HYSTEROSCOPY  5/09    with endometrial ablation     MOHS MICROGRAPHIC PROCEDURE       PELVIS LAPAROSCOPY,DX  1996    Laparoscopy, benign fatty tumor LLQ     SURGICAL HISTORY OF -   11/2005    [facility]  Blepharoplasty     SURGICAL HISTORY OF -   9/2006    [facility] Breast Reduction     Medications:  Current Outpatient Prescriptions   Medication Sig Dispense Refill     Loratadine (CLARITIN PO) Take by mouth daily       lidocaine 1% with EPINEPHrine 1:100,000 1 %-1:207189 injection Inject 3 mLs into the skin once for 1 dose 3 mL 0     levothyroxine (SYNTHROID/LEVOTHROID) 75 MCG tablet Take 1 tablet (75 mcg) by mouth daily 90 tablet 3     sertraline (ZOLOFT) 100 MG tablet Take 1 tablet (100 mg) by mouth daily 90 tablet 3     estradiol (ESTRACE VAGINAL) 0.1 MG/GM cream Place 2 g vaginally twice a week 42.5 g 3     Omeprazole Magnesium (PRILOSEC OTC PO) Take by mouth as needed        MAGNESIUM PO Take 1,000 mg by mouth daily       rizatriptan (MAXALT-MLT) 10 MG disintegrating tablet Take 1 tablet by mouth at onset of headache for migraine (Repeat at two hours if headache not entirely abated). May repeat dose in 2 hours.  Do not exceed 30 mg in 24 hours 12 tablet 6     ALPRAZolam (XANAX) 0.25 MG tablet Take 1 tablet (0.25 mg) by mouth 3 times daily as needed for anxiety (Patient not taking: Reported on 5/9/2017) 10 tablet 0     fexofenadine (ALLEGRA) 180 MG tablet Take by mouth as needed Reported on 5/9/2017        Allergies   Allergen Reactions     Latex Swelling and Rash     Issues with Latex Condoms in the past     Dust Mites      Mold      Pollen Extract      Sulfa Drugs      Rash, hives       Review of Systems:  -As per HPI  -Constitutional: The patient denies fatigue, fevers, chills, unintended weight loss, and night sweats.  -HEENT: Patient denies non-healing oral sores.  -Skin: As above in HPI. No additional skin concerns.    Physical exam:  GEN: This is a well developed, well-nourished female in no acute distress, in a pleasant mood.    SKIN: Total skin excluding the undergarment areas was performed. The exam included the head/face, neck, both arms, chest, back, abdomen, both legs, digits and/or nails.   -There are  waxy stuck on tan to brown papules on the chest, back and legs, one of which on the left shoulder was irritated (these represent the lesions the pt was concerned about).  -New raised red lesion on upper right breast.  -There is xerosis of the arms and lower legs.   -There are bright red dome shaped papules scattered on the back and chest with some adjacent telangiectasias, two of which on the L shoulder and midback demonstrate erythematous halos and coarse vessels on dermoscopy  -hyper pigmented lesion between second and third toe on right foot.  -No other lesions of concern on areas examined.     Impression/Plan:  1. Seborrheic keratosis, inflamed  - Cryotherapy procedure note: After verbal consent and discussion of risks and benefits including but no limited to dyspigmentation/scar, blister, and pain, 1 was treated with 1-2mm freeze border for 9-10 cycles with liquid nitrogen (right breast). Post cryotherapy instructions were provided.     2. Neoplasms of uncertain behavior on left shoulder and midback: BCCs vs inflamed SKs x2.  - Shave Biopsy procedure Note: Procedure Note: Biopsy by shave technique  The risks and benefits of the procedure were described to the patient. These include but are not limited to bleeding, infection, scar, incomplete removal, and non-diagnostic biopsy. Written informed consent was obtained. The left arm, mid-back lesions were cleansed with an alcohol pad and injected with 1% lidocaine with epinephrine buffered with sodium bicarbonate. Once anesthesia was obtained, a biopsy was performed with Gilette blade. The tissue was placed in a labeled container with formalin and sent to pathology. Hemostasis was achieved with aluminum chloride. Vaseline and a bandage were applied to the wound. The patient tolerated the procedure well and was given post biopsy care instructions.    2. Xerosis cutis  - Eucerin or Aquaphor as needed    3. Cherry angioma(s)  - Benign nature was discussed. No further  intervention required at this time.     4.   Polymorphous Light Eruption: recent sun exposure. Noted to have erythematous clusters of papules on arms that the patient reports is now resolving.  - will reexamine in 6 months to determine if these resolve  - educated on responsible sun exposure.    5. History of SCC - clinically JOSEPH today.  Reassurance; sun protective measures discussed.    Follow-up in 6 months, earlier for new or changing lesions.     Peter Garcia MD  Medicine-Pediatrics PGY2    I have seen and examined this patient and agree with the assessment and plan as documented in the resident's note, and was present for all pertinent procedures.    Sahil Ng MD  Dermatology Attending

## 2017-05-11 LAB — COPATH REPORT: NORMAL

## 2017-05-18 PROBLEM — Z85.89 HISTORY OF SQUAMOUS CELL CARCINOMA: Status: ACTIVE | Noted: 2017-05-18

## 2017-07-15 ENCOUNTER — HEALTH MAINTENANCE LETTER (OUTPATIENT)
Age: 57
End: 2017-07-15

## 2018-03-06 ENCOUNTER — TELEPHONE (OUTPATIENT)
Dept: INTERNAL MEDICINE | Facility: CLINIC | Age: 58
End: 2018-03-06

## 2018-03-06 DIAGNOSIS — E03.4 HYPOTHYROIDISM DUE TO ACQUIRED ATROPHY OF THYROID: ICD-10-CM

## 2018-03-06 DIAGNOSIS — N95.2 VAGINAL ATROPHY: ICD-10-CM

## 2018-03-06 DIAGNOSIS — F43.21 ADJUSTMENT DISORDER WITH DEPRESSED MOOD: ICD-10-CM

## 2018-03-06 NOTE — TELEPHONE ENCOUNTER
----- Message from Berna Cordova RN sent at 3/6/2018 11:45 AM CST -----  Regarding: Appointment PCP [place]  Please call to schedule.    Patient is overdue for annual clinic visit - unless otherwise indicated patient needs to be scheduled with 1st available.    Patient may need labs and or imaging - please check with RN care coordinator.    Thanks    I do not need any follow up on the scheduling of this appointment unless the patient will no longer be receiving care in our clinic.

## 2018-03-06 NOTE — TELEPHONE ENCOUNTER
levothyroxine (SYNTHROID/LEVOTHROID) 75 MCG tablet    Last Written Prescription Date:  2/16/17  Last Fill Quantity: 90,   # refills: 3    sertraline (ZOLOFT) 100 MG tablet      Last Written Prescription Date:  2/16/17  Last Fill Quantity: 90,   # refills: 3    Last Office Visit : 2/9/17  Future Office visit:  none    Routing refill request to provider for review/approval because:  Test/lab overdue: PHQ-9- last done 2/26/16. TSH 11/15/16  *Appointment due- scheduling contacted.

## 2018-03-07 RX ORDER — SERTRALINE HYDROCHLORIDE 100 MG/1
100 TABLET, FILM COATED ORAL DAILY
Qty: 90 TABLET | Refills: 0 | Status: SHIPPED | OUTPATIENT
Start: 2018-03-07 | End: 2018-04-05

## 2018-03-07 RX ORDER — ESTRADIOL 0.1 MG/G
2 CREAM VAGINAL
Qty: 42.5 G | Refills: 0 | Status: SHIPPED | OUTPATIENT
Start: 2018-03-08 | End: 2018-10-18

## 2018-03-07 RX ORDER — LEVOTHYROXINE SODIUM 75 UG/1
75 TABLET ORAL DAILY
Qty: 90 TABLET | Refills: 0 | Status: SHIPPED | OUTPATIENT
Start: 2018-03-07 | End: 2018-06-04

## 2018-03-07 NOTE — TELEPHONE ENCOUNTER
----- Message from Roxi Menendez sent at 3/7/2018  8:56 AM CST -----  Regarding: medication refills  Contact: [phone]  Pt calling for her medication refills on levothyroxine (SYNTHROID/LEVOTHROID) 75 MCG tablet and sertraline (ZOLOFT) 100 MG tablet and estradiol (ESTRACE VAGINAL) 0.1 MG/GM cream . She has an upcoming appt on April 5 with Mabel. [id number] [place] [address]. I told her to call her pharmacy as well    Thanks,  Roxi  Call ctr  Please DO NOT send message and or reply back to sender. Call center Representatives DO NOT respond to Messages

## 2018-03-23 ENCOUNTER — MEDICAL CORRESPONDENCE (OUTPATIENT)
Dept: HEALTH INFORMATION MANAGEMENT | Facility: CLINIC | Age: 58
End: 2018-03-23

## 2018-04-03 ENCOUNTER — MEDICAL CORRESPONDENCE (OUTPATIENT)
Dept: HEALTH INFORMATION MANAGEMENT | Facility: CLINIC | Age: 58
End: 2018-04-03

## 2018-04-05 ENCOUNTER — OFFICE VISIT (OUTPATIENT)
Dept: INTERNAL MEDICINE | Facility: CLINIC | Age: 58
End: 2018-04-05
Payer: COMMERCIAL

## 2018-04-05 ENCOUNTER — TELEPHONE (OUTPATIENT)
Dept: GASTROENTEROLOGY | Facility: CLINIC | Age: 58
End: 2018-04-05

## 2018-04-05 VITALS
HEIGHT: 63 IN | BODY MASS INDEX: 39.51 KG/M2 | DIASTOLIC BLOOD PRESSURE: 98 MMHG | WEIGHT: 223 LBS | SYSTOLIC BLOOD PRESSURE: 157 MMHG | HEART RATE: 90 BPM

## 2018-04-05 DIAGNOSIS — G47.33 OSA (OBSTRUCTIVE SLEEP APNEA): ICD-10-CM

## 2018-04-05 DIAGNOSIS — E78.00 PURE HYPERCHOLESTEROLEMIA: ICD-10-CM

## 2018-04-05 DIAGNOSIS — Z12.31 VISIT FOR SCREENING MAMMOGRAM: Primary | ICD-10-CM

## 2018-04-05 DIAGNOSIS — F41.0 PANIC: ICD-10-CM

## 2018-04-05 DIAGNOSIS — D36.9 TUBULOVILLOUS ADENOMA: ICD-10-CM

## 2018-04-05 DIAGNOSIS — F43.23 ADJUSTMENT DISORDER WITH MIXED ANXIETY AND DEPRESSED MOOD: ICD-10-CM

## 2018-04-05 DIAGNOSIS — I10 ESSENTIAL HYPERTENSION: ICD-10-CM

## 2018-04-05 DIAGNOSIS — Z12.11 SPECIAL SCREENING FOR MALIGNANT NEOPLASMS, COLON: ICD-10-CM

## 2018-04-05 LAB
ALBUMIN SERPL-MCNC: 4 G/DL (ref 3.4–5)
ALBUMIN UR-MCNC: NEGATIVE MG/DL
ALP SERPL-CCNC: 57 U/L (ref 40–150)
ALT SERPL W P-5'-P-CCNC: 49 U/L (ref 0–50)
ANION GAP SERPL CALCULATED.3IONS-SCNC: 7 MMOL/L (ref 3–14)
APPEARANCE UR: CLEAR
AST SERPL W P-5'-P-CCNC: 29 U/L (ref 0–45)
BILIRUB SERPL-MCNC: 0.4 MG/DL (ref 0.2–1.3)
BILIRUB UR QL STRIP: NEGATIVE
BUN SERPL-MCNC: 20 MG/DL (ref 7–30)
CALCIUM SERPL-MCNC: 9.4 MG/DL (ref 8.5–10.1)
CHLORIDE SERPL-SCNC: 107 MMOL/L (ref 94–109)
CO2 SERPL-SCNC: 25 MMOL/L (ref 20–32)
COLOR UR AUTO: YELLOW
CREAT SERPL-MCNC: 0.99 MG/DL (ref 0.52–1.04)
ERYTHROCYTE [DISTWIDTH] IN BLOOD BY AUTOMATED COUNT: 13.1 % (ref 10–15)
GFR SERPL CREATININE-BSD FRML MDRD: 58 ML/MIN/1.7M2
GLUCOSE SERPL-MCNC: 102 MG/DL (ref 70–99)
GLUCOSE UR STRIP-MCNC: NEGATIVE MG/DL
HBA1C MFR BLD: 6.2 % (ref 0–6.4)
HCT VFR BLD AUTO: 43.4 % (ref 35–47)
HGB BLD-MCNC: 14.6 G/DL (ref 11.7–15.7)
HGB UR QL STRIP: NEGATIVE
KETONES UR STRIP-MCNC: NEGATIVE MG/DL
LEUKOCYTE ESTERASE UR QL STRIP: NEGATIVE
MCH RBC QN AUTO: 30.8 PG (ref 26.5–33)
MCHC RBC AUTO-ENTMCNC: 33.6 G/DL (ref 31.5–36.5)
MCV RBC AUTO: 92 FL (ref 78–100)
MUCOUS THREADS #/AREA URNS LPF: PRESENT /LPF
NITRATE UR QL: NEGATIVE
PH UR STRIP: 5 PH (ref 5–7)
PLATELET # BLD AUTO: 267 10E9/L (ref 150–450)
POTASSIUM SERPL-SCNC: 4.2 MMOL/L (ref 3.4–5.3)
PROT SERPL-MCNC: 8.1 G/DL (ref 6.8–8.8)
RBC # BLD AUTO: 4.74 10E12/L (ref 3.8–5.2)
RBC #/AREA URNS AUTO: 1 /HPF (ref 0–2)
SODIUM SERPL-SCNC: 138 MMOL/L (ref 133–144)
SOURCE: ABNORMAL
SP GR UR STRIP: 1.01 (ref 1–1.03)
SQUAMOUS #/AREA URNS AUTO: 1 /HPF (ref 0–1)
TSH SERPL DL<=0.005 MIU/L-ACNC: 2.95 MU/L (ref 0.4–4)
UROBILINOGEN UR STRIP-MCNC: 0 MG/DL (ref 0–2)
WBC # BLD AUTO: 7.9 10E9/L (ref 4–11)
WBC #/AREA URNS AUTO: <1 /HPF (ref 0–5)

## 2018-04-05 RX ORDER — VALSARTAN 160 MG/1
160 TABLET ORAL DAILY
Qty: 90 TABLET | Refills: 3 | Status: SHIPPED | OUTPATIENT
Start: 2018-04-05 | End: 2019-03-26

## 2018-04-05 RX ORDER — ALPRAZOLAM 0.25 MG
0.25 TABLET ORAL 3 TIMES DAILY PRN
Qty: 20 TABLET | Refills: 0 | Status: SHIPPED | OUTPATIENT
Start: 2018-04-05 | End: 2022-06-28

## 2018-04-05 ASSESSMENT — ANXIETY QUESTIONNAIRES
1. FEELING NERVOUS, ANXIOUS, OR ON EDGE: SEVERAL DAYS
GAD7 TOTAL SCORE: 9
2. NOT BEING ABLE TO STOP OR CONTROL WORRYING: MORE THAN HALF THE DAYS
3. WORRYING TOO MUCH ABOUT DIFFERENT THINGS: MORE THAN HALF THE DAYS
6. BECOMING EASILY ANNOYED OR IRRITABLE: SEVERAL DAYS
5. BEING SO RESTLESS THAT IT IS HARD TO SIT STILL: SEVERAL DAYS
7. FEELING AFRAID AS IF SOMETHING AWFUL MIGHT HAPPEN: SEVERAL DAYS

## 2018-04-05 ASSESSMENT — PATIENT HEALTH QUESTIONNAIRE - PHQ9: 5. POOR APPETITE OR OVEREATING: SEVERAL DAYS

## 2018-04-05 ASSESSMENT — PAIN SCALES - GENERAL: PAINLEVEL: NO PAIN (0)

## 2018-04-05 NOTE — PATIENT INSTRUCTIONS
[facility] [phone]    Please go to the lab on the first floor before you leave today.

## 2018-04-05 NOTE — NURSING NOTE
Chief Complaint   Patient presents with     Physical     Refill Request     Orders     Cpap     Rooming Note  Health Maintenance   Health Maintenance Due   Topic Date Due     MIGRAINE ACTION PLAN  12/14/1977     DEPRESSION ACTION PLAN Q1 YR  12/14/1977     ADVANCE DIRECTIVE PLANNING Q5 YRS  12/14/2014     PHQ-9 Q6 MONTHS  08/26/2016     MAMMO Q1 YR  05/10/2017     COLONOSCOPY Q3 YR  04/29/2018    Health maintenance items discussed and ordered/forwarded to PCP  Blood Pressure   BP Readings from Last 1 Encounters:   04/05/18 (!) 133/91    Single BP recheck started, 12:25 PM (2 minutes)

## 2018-04-05 NOTE — MR AVS SNAPSHOT
After Visit Summary   4/5/2018    Sherrell Kothari    MRN: [id number]           Patient Information     Date Of Birth          1960        Visit Information        Provider Department      4/5/2018 12:40 PM Mabel Sandra, APRN CNP [facility]        Today's Diagnoses     Visit for screening mammogram    -  1    Special screening for malignant neoplasms, colon        Adjustment disorder with mixed anxiety and depressed mood        Pure hypercholesterolemia        Tubulovillous adenoma colon         LORELEI (obstructive sleep apnea)        Essential hypertension          Care Instructions    Primary Care Clinic [phone]    Please go to the lab on the first floor before you leave today.             Follow-ups after your visit        Additional Services     GASTROENTEROLOGY ADULT REF PROCEDURE ONLY       Last Lab Result: Creatinine (mg/dL)       Date                     Value                 06/21/2016               1.12 (H)         ----------  Body mass index is 39.5 kg/(m^2).     Needed:  No  Language:  English    Patient will be contacted to schedule procedure.     Please be aware that coverage of these services is subject to the terms and limitations of your health insurance plan.  Call member services at your health plan with any benefit or coverage questions.  Any procedures must be performed at a [facility] facility OR coordinated by your clinic's referral office.    Please bring the following with you to your appointment:    (1) Any X-Rays, CTs or MRIs which have been performed.  Contact the facility where they were done to arrange for  prior to your scheduled appointment.    (2) List of current medications   (3) This referral request   (4) Any documents/labs given to you for this referral                  Follow-up notes from your care team     Return in about 4 weeks (around 5/3/2018).      Your next 10 appointments already scheduled     Apr 05,  "2018  2:15 PM CDT   LAB with  LAB   [facility] Lab ([facility])    [address] [place] [id number] [phone]           Please do not eat 10-12 hours before your appointment if you are coming in fasting for labs on lipids, cholesterol, or glucose (sugar). This does not apply to pregnant women. Water, hot tea and black coffee (with nothing added) are okay. Do not drink other fluids, diet soda or chew gum.            Apr 09, 2018  7:45 AM CDT   LAB with  LAB   [facility] Lab ([facility])    [address] [place] [id number] [phone]           Please do not eat 10-12 hours before your appointment if you are coming in fasting for labs on lipids, cholesterol, or glucose (sugar). This does not apply to pregnant women. Water, hot tea and black coffee (with nothing added) are okay. Do not drink other fluids, diet soda or chew gum.            Apr 09, 2018  8:30 AM CDT   (Arrive by 8:15 AM)   MA SCREENING DIGITAL BILATERAL with [id number]   [facility] Imaging ([facility])    [address] [place] [id number] [phone]           Do not use any powder, lotion or deodorant under your arms or on your breast. If you do, we will ask you to remove it before your exam.  Wear comfortable, two-piece clothing.  If you have any allergies, tell your care team.  Bring any previous mammograms from other facilities or have them mailed to the breast center. Three-dimensional (3D) mammograms are available at [facility] locations in [facility], [facility], [place], [place], and [place]. [facility] locations include [place] [facility] in [place]. Benefits of 3D mammograms include: - Improved rate of cancer detection - Decreases your chance of having to go back for more tests, which means fewer: - \"False-positive\" " results (This means that there is an abnormal area but it isn't cancer.) - Invasive testing procedures, such as a biopsy or surgery - Can provide clearer images of the breast if you have dense breast tissue. 3D mammography is an optional exam that anyone can have with a 2D mammogram. It doesn't replace or take the place of a 2D mammogram. 2D mammograms remain an effective screening test for all women.  Not all insurance companies cover the cost of a 3D mammogram. Check with your insurance.            May 08, 2018  9:15 AM CDT   (Arrive by 9:00 AM)   Return Visit with Sahil Ng MD   [facility] Dermatology ([facility])    [address] [place] [address]  3rd [facility] [address] [phone]            May 08, 2018 10:45 AM CDT   (Arrive by 10:30 AM)   Return Visit with MANDA Ocasio [facility] ([facility])    [address] [place] [address]  [address]th [facility] [address] [phone]              Future tests that were ordered for you today     Open Future Orders        Priority Expected Expires Ordered    Comprehensive metabolic panel Routine 4/6/2018 4/19/2018 4/5/2018    TSH Routine 4/6/2018 4/5/2019 4/5/2018    Hemoglobin A1c Routine 4/6/2018 8/10/2018 4/5/2018    Lipid panel reflex to direct LDL Fasting Routine 4/6/2018 4/5/2019 4/5/2018    CBC with platelets Routine 4/6/2018 4/19/2018 4/5/2018    UA with Micro reflex to Culture Routine 4/6/2018 4/5/2019 4/5/2018    MA SCREENING DIGITAL BILAT - Future  (s+30) Routine  4/5/2019 4/5/2018            Who to contact     Please call your clinic at [phone] to:    Ask questions about your health    Make or cancel appointments    Discuss your medicines    Learn about your test results    Speak to your doctor            Additional Information About Your Visit        MyChart Information     [email or web address] gives you secure access to your electronic health record. If you see a primary  "care provider, you can also send messages to your care team and make appointments. If you have questions, please call your primary care clinic.  If you do not have a primary care provider, please call [phone] and they will assist you.      [employer] is an electronic gateway that provides easy, online access to your medical records. With [employer], you can request a clinic appointment, read your test results, renew a prescription or communicate with your care team.     To access your existing account, please contact your [facility] or call [phone] for assistance.        Care EveryWhere ID     This is your Care EveryWhere ID. This could be used by other organizations to access your [place] medical records  [id number]        Your Vitals Were     Pulse Height Last Period BMI (Body Mass Index)          90 1.6 m (5' 3\") 06/17/2013 39.5 kg/m2         Blood Pressure from Last 3 Encounters:   04/05/18 (!) 157/98   02/09/17 125/81   11/29/16 127/84    Weight from Last 3 Encounters:   04/05/18 101.2 kg (223 lb)   02/09/17 96.8 kg (213 lb 6.4 oz)   11/29/16 100.7 kg (222 lb 1.6 oz)              We Performed the Following     GASTROENTEROLOGY ADULT REF PROCEDURE ONLY          Today's Medication Changes          These changes are accurate as of 4/5/18  2:06 PM.  If you have any questions, ask your nurse or doctor.               Start taking these medicines.        Dose/Directions    order for DME   Used for:  LORELEI (obstructive sleep apnea)   Started by:  Mabel Sandra APRN CNP        Equipment being ordered: CPAP and equipment   Quantity:  1 unit marking on an U-100 insulin syringe   Refills:  1       sertraline 50 MG tablet   Commonly known as:  ZOLOFT   Used for:  Adjustment disorder with mixed anxiety and depressed mood   Started by:  Mabel Sandra APRN CNP        Dose:  75 mg   Take 1.5 tablets (75 mg) by mouth daily   Quantity:  135 tablet   Refills:  3       valsartan 160 " MG tablet   Commonly known as:  DIOVAN   Used for:  Essential hypertension   Started by:  Mabel Sandra APRN CNP        Dose:  160 mg   Take 1 tablet (160 mg) by mouth daily   Quantity:  90 tablet   Refills:  3            Where to get your medicines      These medications were sent to [facility] [id number] [place] [facility] [place] [place] - [address] [facility]  [address] [facility] [address]     Phone:  [phone]     sertraline 50 MG tablet    valsartan 160 MG tablet         Some of these will need a paper prescription and others can be bought over the counter.  Ask your nurse if you have questions.     Bring a paper prescription for each of these medications     order for DME                Primary Care Provider Office Phone # Fax #    MANDA Ocasio [phone]       [address] [place] [address] [address]  [facility] [address]        Equal Access to Services     MATY SANCHEZ : Regina mcnair Soolman, waaxda luqadaha, qaybta kaalmada jarrett, evaristo chand. So [facility] [phone].    ATENCIÓN: Si habla español, tiene a bustillos disposición servicios gratuitos de asistencia lingüística. [facility] [phone].    We comply with applicable federal civil rights laws and [place] laws. We do not discriminate on the basis of race, color, national origin, age, disability, sex, sexual orientation, or gender identity.            Thank you!     Thank you for choosing [facility]  for your care. Our goal is always to provide you with excellent care. Hearing back from our patients is one way we can continue to improve our services. Please take a few minutes to complete the written survey that you may receive in the mail after your visit with us. Thank you!             Your Updated Medication List - Protect others around you: Learn how to safely use, store and throw away your medicines at www.disposemymeds.org.          This list is accurate as of 4/5/18  2:06 PM.   Always use your most recent med list.                   Brand Name Dispense Instructions for use Diagnosis    ALPRAZolam 0.25 MG tablet    XANAX    10 tablet    Take 1 tablet (0.25 mg) by mouth 3 times daily as needed for anxiety    Panic       CLARITIN PO      Take by mouth daily        estradiol 0.1 MG/GM cream    ESTRACE VAGINAL    42.5 g    Place 2 g vaginally twice a week    Vaginal atrophy       fexofenadine 180 MG tablet    ALLEGRA     Take by mouth as needed Reported on 5/9/2017        levothyroxine 75 MCG tablet    SYNTHROID/LEVOTHROID    90 tablet    Take 1 tablet (75 mcg) by mouth daily    Hypothyroidism due to acquired atrophy of thyroid       MAGNESIUM PO      Take 1,000 mg by mouth daily        order for DME     1 unit marking on an U-100 insulin syringe    Equipment being ordered: CPAP and equipment    LORELEI (obstructive sleep apnea)       PRILOSEC OTC PO      Take by mouth as needed        rizatriptan 10 MG ODT tab    MAXALT-MLT    12 tablet    Take 1 tablet by mouth at onset of headache for migraine (Repeat at two hours if headache not entirely abated). May repeat dose in 2 hours.  Do not exceed 30 mg in 24 hours    Migraine without aura, with intractable migraine, so stated, without mention of status migrainosus       sertraline 50 MG tablet    ZOLOFT    135 tablet    Take 1.5 tablets (75 mg) by mouth daily    Adjustment disorder with mixed anxiety and depressed mood       valsartan 160 MG tablet    DIOVAN    90 tablet    Take 1 tablet (160 mg) by mouth daily    Essential hypertension

## 2018-04-06 ENCOUNTER — TELEPHONE (OUTPATIENT)
Dept: GASTROENTEROLOGY | Facility: OUTPATIENT CENTER | Age: 58
End: 2018-04-06

## 2018-04-06 ENCOUNTER — TELEPHONE (OUTPATIENT)
Dept: GASTROENTEROLOGY | Facility: CLINIC | Age: 58
End: 2018-04-06

## 2018-04-06 DIAGNOSIS — Z12.11 ENCOUNTER FOR SCREENING COLONOSCOPY: Primary | ICD-10-CM

## 2018-04-06 ASSESSMENT — ANXIETY QUESTIONNAIRES: GAD7 TOTAL SCORE: 9

## 2018-04-06 ASSESSMENT — PATIENT HEALTH QUESTIONNAIRE - PHQ9: SUM OF ALL RESPONSES TO PHQ QUESTIONS 1-9: 8

## 2018-04-06 NOTE — TELEPHONE ENCOUNTER
Patient taking any blood thinners ? no    Heart disease ? denies    Lung disease ? denies      Sleep apnea ? Cpap    Diabetic ? denies    Kidney disease ? denies    Dialysis ? n/a    Electronic implanted medical devices ? denies    Are you taking any narcotic pain medication ? no  What is your daily dosage ?    PTSD ? n/a    Prep instructions reviewed with patient ? Instructions,  policy, MAC sedation plan reviewed. Advised patient to have someone stay with her post exam    [facility]    Indication for procedure :Special screening for malignant neoplasms, colon [Z12.11    Referring provider :Mabel Sandra APRN CNP     Arrival Time : Patient will arrive at 9:30 AM

## 2018-04-09 ENCOUNTER — RADIANT APPOINTMENT (OUTPATIENT)
Dept: MAMMOGRAPHY | Facility: CLINIC | Age: 58
End: 2018-04-09
Attending: NURSE PRACTITIONER
Payer: COMMERCIAL

## 2018-04-09 DIAGNOSIS — E78.00 PURE HYPERCHOLESTEROLEMIA: ICD-10-CM

## 2018-04-09 DIAGNOSIS — Z12.31 VISIT FOR SCREENING MAMMOGRAM: ICD-10-CM

## 2018-04-09 LAB
CHOLEST SERPL-MCNC: 220 MG/DL
HDLC SERPL-MCNC: 45 MG/DL
LDLC SERPL CALC-MCNC: 142 MG/DL
NONHDLC SERPL-MCNC: 174 MG/DL
TRIGL SERPL-MCNC: 160 MG/DL

## 2018-04-12 ENCOUNTER — DOCUMENTATION ONLY (OUTPATIENT)
Dept: GASTROENTEROLOGY | Facility: OUTPATIENT CENTER | Age: 58
End: 2018-04-12
Payer: COMMERCIAL

## 2018-04-12 ENCOUNTER — TRANSFERRED RECORDS (OUTPATIENT)
Dept: HEALTH INFORMATION MANAGEMENT | Facility: CLINIC | Age: 58
End: 2018-04-12

## 2018-04-16 LAB — COPATH REPORT: NORMAL

## 2018-04-20 ENCOUNTER — TELEPHONE (OUTPATIENT)
Dept: GASTROENTEROLOGY | Facility: CLINIC | Age: 58
End: 2018-04-20

## 2018-04-20 NOTE — TELEPHONE ENCOUNTER
Able to reach patient today via telephone.    We discussed that the currently available pathology report does not list all polyps submitted and that I have requested clarification from pathology.   We discussed the currently available results. She has some adenomatous (pre-cancerous) polyps, though majority are hyperplastic (benign, which require no follow-up).   Based on her family history she should go no longer than 5 years between colonoscopies. Pending the final pathology report (and number of adenomatous polyps) she will need to return in 3-5 years for her next colonoscopy.   I will communicate these results when the final pathology report is available.   Pt has no questions at this time.

## 2018-05-02 ENCOUNTER — MEDICAL CORRESPONDENCE (OUTPATIENT)
Dept: HEALTH INFORMATION MANAGEMENT | Facility: CLINIC | Age: 58
End: 2018-05-02

## 2018-05-02 NOTE — TELEPHONE ENCOUNTER
Ms. Kothari was able to return our call today.     Confirmed with her that all specimens were accounted for in the final path report. Two tubular adenomas were seen. In addition, a hepatic flexure polyp was not collected as we discussed at the time of her procedure (destroyed by cautery effect after failure of snare to cut - see procedure note for further details). Remainder of polyps were all hyperplastic. Will plan for surveillance colonoscopy in three years.     Questions answered to the patient's satisfaction and understanding was confirmed at the end of our conversation

## 2018-05-02 NOTE — TELEPHONE ENCOUNTER
Able to touch base with pathology.   Attempted to reach patient to update her. Voicemail message left with GI clinic contact information.

## 2018-06-04 DIAGNOSIS — F43.21 ADJUSTMENT DISORDER WITH DEPRESSED MOOD: ICD-10-CM

## 2018-06-04 DIAGNOSIS — E03.4 HYPOTHYROIDISM DUE TO ACQUIRED ATROPHY OF THYROID: ICD-10-CM

## 2018-06-05 RX ORDER — SERTRALINE HYDROCHLORIDE 100 MG/1
TABLET, FILM COATED ORAL
Qty: 90 TABLET | Refills: 0 | OUTPATIENT
Start: 2018-06-05

## 2018-06-05 RX ORDER — LEVOTHYROXINE SODIUM 75 UG/1
75 TABLET ORAL DAILY
Qty: 90 TABLET | Refills: 3 | Status: SHIPPED | OUTPATIENT
Start: 2018-06-05 | End: 2019-06-24

## 2018-10-12 ENCOUNTER — DOCUMENTATION ONLY (OUTPATIENT)
Dept: SLEEP MEDICINE | Facility: CLINIC | Age: 58
End: 2018-10-12
Payer: COMMERCIAL

## 2018-10-12 NOTE — PROGRESS NOTES
Patient was seen with me today 10/12/18 here in [place] to check her EPR on her [employer] S9 auto machine. I checked it and the EPR was set at patient. So I changed the EPR to 2. Patient also asked me to make sure her pressure is still set correct to the pressures order. I checked and it is still at the correct settings per in 2013 at 12-16 cm H2O. I let her know to call if she still has any issues.

## 2018-10-18 DIAGNOSIS — N95.2 VAGINAL ATROPHY: ICD-10-CM

## 2018-10-19 NOTE — TELEPHONE ENCOUNTER
estradiol (ESTRACE VAGINAL) 0.1 MG/GM cream  Last Written Prescription Date:  3/8/18  Last Fill Quantity: 42.5g,   # refills: 0  Last Office Visit :  4/5/18  Future Office visit:  11/20/18    Routing because:  bp > 140/90

## 2018-10-20 RX ORDER — ESTRADIOL 0.1 MG/G
2 CREAM VAGINAL
Qty: 85 G | Refills: 0 | Status: SHIPPED | OUTPATIENT
Start: 2018-10-22 | End: 2019-09-24

## 2018-11-20 ENCOUNTER — OFFICE VISIT (OUTPATIENT)
Dept: INTERNAL MEDICINE | Facility: CLINIC | Age: 58
End: 2018-11-20
Payer: COMMERCIAL

## 2018-11-20 VITALS
WEIGHT: 223.1 LBS | RESPIRATION RATE: 16 BRPM | BODY MASS INDEX: 39.52 KG/M2 | HEART RATE: 78 BPM | OXYGEN SATURATION: 96 % | TEMPERATURE: 98 F | DIASTOLIC BLOOD PRESSURE: 83 MMHG | SYSTOLIC BLOOD PRESSURE: 125 MMHG

## 2018-11-20 DIAGNOSIS — E03.4 HYPOTHYROIDISM DUE TO ACQUIRED ATROPHY OF THYROID: Primary | ICD-10-CM

## 2018-11-20 DIAGNOSIS — E66.812 CLASS 2 OBESITY DUE TO EXCESS CALORIES WITHOUT SERIOUS COMORBIDITY WITH BODY MASS INDEX (BMI) OF 35.0 TO 35.9 IN ADULT: ICD-10-CM

## 2018-11-20 DIAGNOSIS — E78.2 MIXED HYPERLIPIDEMIA: ICD-10-CM

## 2018-11-20 DIAGNOSIS — E03.4 HYPOTHYROIDISM DUE TO ACQUIRED ATROPHY OF THYROID: ICD-10-CM

## 2018-11-20 DIAGNOSIS — R82.998 DARK URINE: ICD-10-CM

## 2018-11-20 DIAGNOSIS — E66.09 CLASS 2 OBESITY DUE TO EXCESS CALORIES WITHOUT SERIOUS COMORBIDITY WITH BODY MASS INDEX (BMI) OF 35.0 TO 35.9 IN ADULT: ICD-10-CM

## 2018-11-20 LAB
ALBUMIN UR-MCNC: NEGATIVE MG/DL
APPEARANCE UR: CLEAR
BILIRUB UR QL STRIP: NEGATIVE
CHOLEST SERPL-MCNC: 236 MG/DL
COLOR UR AUTO: ABNORMAL
GLUCOSE UR STRIP-MCNC: NEGATIVE MG/DL
HBA1C MFR BLD: 6.5 % (ref 0–5.6)
HDLC SERPL-MCNC: 47 MG/DL
HGB UR QL STRIP: NEGATIVE
KETONES UR STRIP-MCNC: NEGATIVE MG/DL
LDLC SERPL CALC-MCNC: 146 MG/DL
LEUKOCYTE ESTERASE UR QL STRIP: ABNORMAL
NITRATE UR QL: NEGATIVE
NONHDLC SERPL-MCNC: 189 MG/DL
PH UR STRIP: 6 PH (ref 5–7)
RBC #/AREA URNS AUTO: <1 /HPF (ref 0–2)
SOURCE: ABNORMAL
SP GR UR STRIP: 1 (ref 1–1.03)
SQUAMOUS #/AREA URNS AUTO: <1 /HPF (ref 0–1)
TRIGL SERPL-MCNC: 217 MG/DL
TSH SERPL DL<=0.005 MIU/L-ACNC: 3.44 MU/L (ref 0.4–4)
UROBILINOGEN UR STRIP-MCNC: 0 MG/DL (ref 0–2)
WBC #/AREA URNS AUTO: 13 /HPF (ref 0–5)

## 2018-11-20 RX ORDER — ACETAMINOPHEN 325 MG/1
325-650 TABLET ORAL EVERY 6 HOURS PRN
COMMUNITY
End: 2022-06-28

## 2018-11-20 ASSESSMENT — PATIENT HEALTH QUESTIONNAIRE - PHQ9
5. POOR APPETITE OR OVEREATING: NOT AT ALL
SUM OF ALL RESPONSES TO PHQ QUESTIONS 1-9: 8

## 2018-11-20 ASSESSMENT — ANXIETY QUESTIONNAIRES
7. FEELING AFRAID AS IF SOMETHING AWFUL MIGHT HAPPEN: NOT AT ALL
3. WORRYING TOO MUCH ABOUT DIFFERENT THINGS: NOT AT ALL
1. FEELING NERVOUS, ANXIOUS, OR ON EDGE: NOT AT ALL
5. BEING SO RESTLESS THAT IT IS HARD TO SIT STILL: NOT AT ALL
6. BECOMING EASILY ANNOYED OR IRRITABLE: NOT AT ALL
GAD7 TOTAL SCORE: 0
2. NOT BEING ABLE TO STOP OR CONTROL WORRYING: NOT AT ALL

## 2018-11-20 ASSESSMENT — PAIN SCALES - GENERAL: PAINLEVEL: MODERATE PAIN (4)

## 2018-11-20 NOTE — NURSING NOTE
Chief Complaint   Patient presents with     Recheck Medication     Patient is here for follow up and medication recheck; valsartan and xanax       Pernell Cedillo CMA ([facility]) at 10:14 AM on 11/20/2018

## 2018-11-20 NOTE — PROGRESS NOTES
"[facility]   Mabel Sandra, MADNA CNP  11/20/2018      Chief Complaint:   Recheck Medication     History of Present Illness:   Sherrell Kothari is a 57 year old female with a history of hypertension, GERD, and obesity who presents for evaluation of medication. Sophia is instructed to take Valsartan 160 mg daily, but has not been taking it as she recently received a letter which stated that Valsartan prescriptions from current manufacturers were being recalled due to an impurity. Sophia does not believe the  listed on her bottle of Valsartan was one of the manufacturers listed in the recall notice, but would like to check with me to ensure that she is safe to continue taking this medication.     Sophia reports low energy, lung pain, and chest pain and pressure at a specific spot on her chest wall located left anterior at the 4th  Costochondral joint. She does not have this pain brought on by climbing stairs or doing other strenuous activities. She wonders if this medication may somehow be reducing these issues as well as hypertension. Her chest pain is present even when she is resting. The results of her most recent echocardiogram, on 11/30/2012, were normal (see results below). Sophia is interested in undergoing an endoscopy, as she reports a \"crackling\" pain in her lungs and believes she may be aspirating or experiencing \"esophageal degeneration\" due to reflux. Sophia adds that she has not been sleeping well lately, which may be the reason she is experiencing low energy.    Sophia currently does not take any medication to manage her hyperlipidemia, although her cholesterol lab results from 4/9/2018 were abnormal (see results below).    Other concerns discussed:  Sophia reports that she has already received the flu vaccine.     Sophia reports dark-colored urine, which she believes may be due to dehydration.     Sophia reports impaired memory, which she attributes to taking a statin at " some point in the past.     Sophia reports that she does not frequently take Omeprazole.      Review of Systems:   Pertinent items are noted in HPI, remainder of complete ROS is negative.      Active Medications:   Current Outpatient Prescriptions:      acetaminophen (TYLENOL) 325 MG tablet, Take 325-650 mg by mouth every 6 hours as needed for mild pain, Disp: , Rfl:      ALPRAZolam (XANAX) 0.25 MG tablet, Take 1 tablet (0.25 mg) by mouth 3 times daily as needed for anxiety, Disp: 20 tablet, Rfl: 0     Aspirin (ASPIR-81 PO), Take 81 mg by mouth as needed, Disp: , Rfl:      estradiol (ESTRACE VAGINAL) 0.1 MG/GM cream, Place 2 g vaginally twice a week, Disp: 85 g, Rfl: 0     fexofenadine (ALLEGRA) 180 MG tablet, Take by mouth as needed Reported on 5/9/2017, Disp: , Rfl:      levothyroxine (SYNTHROID/LEVOTHROID) 75 MCG tablet, Take 1 tablet (75 mcg) by mouth daily, Disp: 90 tablet, Rfl: 3     MAGNESIUM PO, Take 1,000 mg by mouth daily, Disp: , Rfl:      Omeprazole Magnesium (PRILOSEC OTC PO), Take by mouth as needed , Disp: , Rfl:      order for DME, Equipment being ordered: CPAP and equipment, Disp: 1 unit marking on an U-100 insulin syringe, Rfl: 1     rizatriptan (MAXALT-MLT) 10 MG disintegrating tablet, Take 1 tablet by mouth at onset of headache for migraine (Repeat at two hours if headache not entirely abated). May repeat dose in 2 hours.  Do not exceed 30 mg in 24 hours, Disp: 12 tablet, Rfl: 6     sertraline (ZOLOFT) 50 MG tablet, Take 1.5 tablets (75 mg) by mouth daily, Disp: 135 tablet, Rfl: 3     valsartan (DIOVAN) 160 MG tablet, Take 1 tablet (160 mg) by mouth daily (Patient not taking: Reported on 11/20/2018), Disp: 90 tablet, Rfl: 3      Allergies:   Latex; Dust mites; Mold; Pollen extract; and Sulfa drugs      Past Medical History:  Obesity  Hypersomnia with sleep apnea, unspecified  Syncope  Dizziness and giddiness  Other forms of migraine without mention of intractable migraine or status migrainosus,  "intermittent  Temporomandibular disorder  Bruxism  Major depressive disorder, recurrent episode, in partial or unspecified remission  Anxiety state  Allergic rhinitis  Chronic nasal congestion  Squamous cell skin cancer upper lip  Seborrheic keratoses, inflamed  Cherry angioma  Xerosis cutis  Hypothyroidism due to acquired atrophy of thyroid  Dyspnea and respiratory abnormality  Chest pain, non-cardiac  Essential hypertension, benign  Mixed hyperlipidemia  Pure hypercholesterolemia  Abnormal glucose  Diaphragmatic hernia without mention of obstruction or gangrene  Tubulovillous adenoma of colon  Chondromalacia of patella  H. Pylori infection, treated     Past Surgical History:  Biopsy of skin lesion  Hysteroscopy, surgical, with endometrial ablation  Tonsillectomy (pt under 12 years of age)  Mohs micrographic procedure   Bilateral blepharoplasty, 2005  Bilateral breast reduction, 2006    Family History:   Mother: Coronary artery disease (MI age 59), colon polyps, thyroid disease  Father: Alcohol/drug abuse  Brother: Colorectal cancer  Half-brother: Colon cancer  Maternal grandmother: Diabetes, colorectal cancer, thyroid disease, colon cancer, skin cancer  Maternal grandfather: Leukemia ()  Maternal aunt: Myocardial infarction  Maternal uncle: Myocardial infarction      Social History:   Marital Status:   Presents to the clinic alone  Tobacco Use: Former cigarette smoker (0.2 packs/day for 5 years; started age 12, quit age 17), never used smokeless tobacco  Alcohol Use: \"1-2 drinks per week\"  PCP: Mabel Sandra     Physical Exam:   /83 (BP Location: Right arm, Patient Position: Sitting, Cuff Size: Adult Large)  Pulse 78  Temp 98  F (36.7  C) (Oral)  Resp 16  Wt 101.2 kg (223 lb 1.6 oz)  LMP 2013  SpO2 96%  Breastfeeding? No  BMI 39.52 kg/m2   Wt Readings from Last 1 Encounters:   18 101.2 kg (223 lb 1.6 oz)   Constitutional: no distress, comfortable, pleasant.  Weight " stable.   Eyes: anicteric  Ears,  and Throat: tympanic membranes clear, throat clear.   Neck: supple with full range of motion, no thyromegaly.   Cardiovascular: regular rate and rhythm, normal S1 and S2  Lungs: clear A and P  Gastrointestinal: positive bowel sounds, no hepatosplenomegaly, no masses. Tender to palpation over the epigastric area.   Musculoskeletal: tender to palpation over the 4th costochondral joint  Skin: no concerning lesions, no jaundice, temp normal   Neurological: normal speech, no tremor. A and O x 3, good historian.  Psychological: appropriate mood, good eye contact, normal affect     Results Discussed:  Lipid Reflex to Direct LDL Panel, 4/9/2018:  Component      Latest Ref Rng & Units 4/9/2018   Cholesterol      <200 mg/dL 220 (H)   Triglycerides      <150 mg/dL 160 (H)   HDL Cholesterol      >49 mg/dL 45 (L)   LDL Cholesterol Calculated      <100 mg/dL 142 (H)   Non HDL Cholesterol      <130 mg/dL 174 (H)     Exercise Stress Echocardiogram, 11/30/2012:  Findings:  Left Ventricle  Normal left ventricular size, function and wall motion.  Wall Motion  Rest Echo Findings  All wall segments showed normal motion.  Stress Echo Findings  All wall segments showed normal motion.  Right Ventricle  Normal right ventricular cavity size, wall thickness, and systolic function.  Atria  The left atrium is mildly dilated.  The right atrium is normal in size.  Aortic Valve  Normal aortic valve structure and function.  Mitral Valve  Normal mitral valve structure and function.  There is mild annular calcification.  Tricuspid Valve  Normal tricuspid valve structure.  There is mild tricuspid valve regurgitation.  The pulmonary artery systolic pressure is normal.  Pericardium/Pleural  There is no evidence of a pericardial effusion.  Summary  Contrast agent, Definity, was given per protocol without apparent complications.  Echo Interpretation  This is a normal stress echocardiogram.    The left ventricular size  decreases with stress.  The left ventricular ejection fraction increases with stress.  The ejection fraction is 65-70% with stress.  Stress Protocol  The patient exercised for a total of 10 minutes using the bike stress echo protocol.  The resting heart rate is 61 bpm and the BP is 111/74 mmHg.  The maximal heart rate is 159 bpm which represents 95% of age predicted target heart rate.  The peak BP is 181/92.  The rate pressure product [place] 780[address]. Exercise was stopped due to achievement of target heart rate.  Albino Matute MD     Assessment and Plan:  Hypothyroidism due to acquired atrophy of thyroid  - TSH    Mixed hyperlipidemia  I instructed Sophia to focus on reducing her carbohydrate intake, and reviewed some strategies for doing this.   - Lipid panel reflex to direct LDL Fasting    Class 2 obesity due to excess calories without serious comorbidity with body mass index (BMI) of 35.0 to 35.9 in adult  - Hemoglobin A1c    Dark urine  - UA with Micro reflex to Culture.     She will be contacted with results and follow-up.        Scribe Disclosure:   I, Eva Mayfield, am serving as a scribe to document services personally performed by MANDA Yanez CNP at this visit, based upon the provider's statements to me. All documentation has been reviewed by the aforementioned provider prior to being entered into the official medical record.     Portions of this medical record were completed by a scribe. UPON MY REVIEW AND AUTHENTICATION BY ELECTRONIC SIGNATURE, this confirms (a) I performed the applicable clinical services, and (b) the record is accurate.     Total time spent 25 minutes.  More than 50% of the time spent with Ms. Kothari on counseling / coordinating her care  Mabel HOLMAN CNP

## 2018-11-20 NOTE — MR AVS SNAPSHOT
After Visit Summary   11/20/2018    Sherrell Kothari    MRN: [id number]           Patient Information     Date Of Birth          1960        Visit Information        Provider Department      11/20/2018 10:00 AM Mabel Sandra APRN CNP [facility]        Today's Diagnoses     Hypothyroidism due to acquired atrophy of thyroid    -  1    Mixed hyperlipidemia        Class 2 obesity due to excess calories without serious comorbidity with body mass index (BMI) of 35.0 to 35.9 in adult        Dark urine           Follow-ups after your visit        Your next 10 appointments already scheduled     Jan 17, 2019  1:15 PM CST   (Arrive by 1:00 PM)   Return Visit with Sahil Ng MD   [facility] ([facility])    [address]  3rd Floor  [facility] [address] [phone]              Future tests that were ordered for you today     Open Future Orders        Priority Expected Expires Ordered    UA with Micro reflex to Culture Routine 11/20/2018 11/20/2019 11/20/2018    TSH Routine 11/20/2018 11/20/2019 11/20/2018    Hemoglobin A1c Routine  11/20/2019 11/20/2018    Lipid panel reflex to direct LDL Fasting Routine 11/20/2018 12/4/2018 11/20/2018            Who to contact     Please call your clinic at [phone] to:    Ask questions about your health    Make or cancel appointments    Discuss your medicines    Learn about your test results    Speak to your doctor            Additional Information About Your Visit        [employer] Information     [employer] gives you secure access to your electronic health record. If you see a primary care provider, you can also send messages to your care team and make appointments. If you have questions, please call your primary care clinic.  If you do not have a primary care provider, please call [phone] and they will assist you.      [employer] is an electronic gateway that provides easy, online  access to your medical records. With [employer], you can request a clinic appointment, read your test results, renew a prescription or communicate with your care team.     To access your existing account, please contact your [facility] or call [phone] for assistance.        Care EveryWhere ID     This is your Care EveryWhere ID. This could be used by other organizations to access your [place] medical records  [id number]        Your Vitals Were     Pulse Temperature Respirations Last Period Pulse Oximetry Breastfeeding?    78 98  F (36.7  C) (Oral) 16 06/17/2013 96% No    BMI (Body Mass Index)                   39.52 kg/m2            Blood Pressure from Last 3 Encounters:   11/20/18 125/83   04/05/18 (!) 157/98   02/09/17 125/81    Weight from Last 3 Encounters:   11/20/18 101.2 kg (223 lb 1.6 oz)   04/05/18 101.2 kg (223 lb)   02/09/17 96.8 kg (213 lb 6.4 oz)               Primary Care Provider Office Phone # Fax #    Mabel GASTON Boaz, APRN [phone]       [address] [place] [address] [phone]  [place] [address]        Equal Access to Services     MATY SANCHEZ AH: Hadii carmelita sol hadasho Soomaali, waaxda luqadaha, qaybta kaalmada adeegyada, evaristo chand. So [facility] [phone].    ATENCIÓN: Si habla español, tiene a bustillos disposición servicios gratuitos de asistencia lingüística. Llame al [phone].    We comply with applicable federal civil rights laws and [place] laws. We do not discriminate on the basis of race, color, national origin, age, disability, sex, sexual orientation, or gender identity.            Thank you!     Thank you for choosing [facility]  for your care. Our goal is always to provide you with excellent care. Hearing back from our patients is one way we can continue to improve our services. Please take a few minutes to complete the written survey that you may receive in the mail after your visit with us. Thank  you!             Your Updated Medication List - Protect others around you: Learn how to safely use, store and throw away your medicines at www.disposemymeds.org.          This list is accurate as of 11/20/18 10:56 AM.  Always use your most recent med list.                   Brand Name Dispense Instructions for use Diagnosis    ALPRAZolam 0.25 MG tablet    XANAX    20 tablet    Take 1 tablet (0.25 mg) by mouth 3 times daily as needed for anxiety    Panic       ASPIR-81 PO      Take 81 mg by mouth as needed        estradiol 0.1 MG/GM cream    ESTRACE VAGINAL    85 g    Place 2 g vaginally twice a week    Vaginal atrophy       fexofenadine 180 MG tablet    ALLEGRA     Take by mouth as needed Reported on 5/9/2017        levothyroxine 75 MCG tablet    SYNTHROID/LEVOTHROID    90 tablet    Take 1 tablet (75 mcg) by mouth daily    Hypothyroidism due to acquired atrophy of thyroid       MAGNESIUM PO      Take 1,000 mg by mouth daily        order for DME     1 unit marking on an U-100 insulin syringe    Equipment being ordered: CPAP and equipment    LORELEI (obstructive sleep apnea)       PRILOSEC OTC PO      Take by mouth as needed        rizatriptan 10 MG ODT tab    MAXALT-MLT    12 tablet    Take 1 tablet by mouth at onset of headache for migraine (Repeat at two hours if headache not entirely abated). May repeat dose in 2 hours.  Do not exceed 30 mg in 24 hours    Migraine without aura, with intractable migraine, so stated, without mention of status migrainosus       sertraline 50 MG tablet    ZOLOFT    135 tablet    Take 1.5 tablets (75 mg) by mouth daily    Adjustment disorder with mixed anxiety and depressed mood       TYLENOL 325 MG tablet   Generic drug:  acetaminophen      Take 325-650 mg by mouth every 6 hours as needed for mild pain        valsartan 160 MG tablet    DIOVAN    90 tablet    Take 1 tablet (160 mg) by mouth daily    Essential hypertension

## 2018-11-21 LAB
BACTERIA SPEC CULT: NO GROWTH
Lab: NORMAL
SPECIMEN SOURCE: NORMAL

## 2018-11-21 ASSESSMENT — ANXIETY QUESTIONNAIRES: GAD7 TOTAL SCORE: 0

## 2018-11-24 DIAGNOSIS — E74.39 GLUCOSE INTOLERANCE: ICD-10-CM

## 2018-11-24 DIAGNOSIS — E78.00 ELEVATED CHOLESTEROL: Primary | ICD-10-CM

## 2018-11-24 RX ORDER — ATORVASTATIN CALCIUM 10 MG/1
10 TABLET, FILM COATED ORAL DAILY
Qty: 90 TABLET | Refills: 3 | Status: SHIPPED | OUTPATIENT
Start: 2018-11-24 | End: 2019-12-10

## 2019-02-13 ENCOUNTER — DOCUMENTATION ONLY (OUTPATIENT)
Dept: CARE COORDINATION | Facility: CLINIC | Age: 59
End: 2019-02-13

## 2019-03-26 DIAGNOSIS — I10 ESSENTIAL HYPERTENSION: ICD-10-CM

## 2019-03-26 DIAGNOSIS — F43.23 ADJUSTMENT DISORDER WITH MIXED ANXIETY AND DEPRESSED MOOD: ICD-10-CM

## 2019-03-28 RX ORDER — VALSARTAN 160 MG/1
TABLET ORAL
Qty: 90 TABLET | Refills: 2 | Status: SHIPPED | OUTPATIENT
Start: 2019-03-28 | End: 2022-06-28

## 2019-06-24 DIAGNOSIS — E03.4 HYPOTHYROIDISM DUE TO ACQUIRED ATROPHY OF THYROID: ICD-10-CM

## 2019-06-24 DIAGNOSIS — F43.23 ADJUSTMENT DISORDER WITH MIXED ANXIETY AND DEPRESSED MOOD: ICD-10-CM

## 2019-06-29 RX ORDER — LEVOTHYROXINE SODIUM 75 UG/1
75 TABLET ORAL DAILY
Qty: 90 TABLET | Refills: 1 | Status: SHIPPED | OUTPATIENT
Start: 2019-06-29 | End: 2019-12-11

## 2019-06-29 NOTE — TELEPHONE ENCOUNTER
sertraline (ZOLOFT) 50 MG tablet   Last Written Prescription Date:  3/28/19  Last Fill Quantity: 135,   # refills: 0  Last Office Visit : 11/20/18  Future Office visit:  none    Routing because: [id number]

## 2019-07-02 ENCOUNTER — OFFICE VISIT (OUTPATIENT)
Dept: DERMATOLOGY | Facility: CLINIC | Age: 59
End: 2019-07-02
Payer: COMMERCIAL

## 2019-07-02 DIAGNOSIS — D18.01 CHERRY ANGIOMA: ICD-10-CM

## 2019-07-02 DIAGNOSIS — Z85.828 HISTORY OF SKIN CANCER: ICD-10-CM

## 2019-07-02 DIAGNOSIS — Z12.83 SKIN EXAM FOR MALIGNANT NEOPLASM: Primary | ICD-10-CM

## 2019-07-02 DIAGNOSIS — L82.1 SK (SEBORRHEIC KERATOSIS): ICD-10-CM

## 2019-07-02 ASSESSMENT — PAIN SCALES - GENERAL: PAINLEVEL: NO PAIN (0)

## 2019-07-02 NOTE — LETTER
7/2/2019       RE: Sherrell Kothari  [address] [facility] [place] [address]     Dear Colleague,    Thank you for referring your patient, Sherrell Kothari, to the [facility] DERMATOLOGY at [facility]. Please see a copy of my visit note below.    [facility] Dermatology Note      Dermatology Problem List:  1. Dysplastic nevi   - s/p excision  2. SCC, lip   - Removed in mid-2000's (1990s per patient)  3. Irritated Seborrheic Keratosis   - Cryotherapy  4. Hypertrophic scars   - s/p laser  5. Xerosis cutis   - Amlactin    Encounter Date: Jul 2, 2019    CC:   FBSE    History of Present Illness:  Ms. Sherrell Kothari is a 58 year old female who with a history of skin cancer presents for a full body skin exam. Last seen by Dr. Ng in 5/2017. No new obvious areas of concern. Unsure about her back as she cannot visualize it. Reports that she has made numerous trips to [place] and the [facility] as well as been increased gardening outside. Endorses using sunscreen and sun protective clothing. Denies any recent burns or sun tanning    Past Medical History:   Patient Active Problem List   Diagnosis     Dyspnea and respiratory abnormality     Chest pain     Pure hypercholesterolemia     Chondromalacia of patella     Mixed hyperlipidemia     Allergic rhinitis     Other type of migraine     Tubulovillous adenoma colon      Dizziness and giddiness     Anxiety state     Major depressive disorder, recurrent episode, in partial or unspecified remission     Bruxism     Neoplasm of uncertain behavior of skin     Xerosis cutis     History of skin cancer     Obesity     Abnormal glucose     Obstructive sleep apnea     Chronic nasal congestion     Hypothyroidism due to acquired atrophy of thyroid     Seborrheic keratoses, inflamed     History of squamous cell carcinoma     Past Medical History:   Diagnosis Date     Allergic rhinitis, cause unspecified      Allergic rhinitis     Carcinoma in situ of skin of other and unspecified parts of face     squamous cell skin cancer upper lip     Depressive disorder, not elsewhere classified      Diaphragmatic hernia without mention of obstruction or gangrene     hiatal hernia     Essential hypertension, benign      Helicobacter pylori (H. pylori)     TREATED     Hypersomnia with sleep apnea, unspecified     CPAP     Mixed hyperlipidemia      Other chest pain 8/04    non-cardiac, cardiology,  nl stress test     Other forms of migraine, without mention of intractable migraine without mention of status migrainosus     intermittent, formerly treated with Zomig     Squamous cell carcinoma     Mohs surgery     Syncope ER 12/09    NL echo, head CT     Temporomandibular disorder      Unspecified hypothyroidism      Past Surgical History:   Procedure Laterality Date     BIOPSY OF SKIN LESION       C ECHO HEART XTHORACIC,COMPLETE, W/O DOPPLER  12/09    EF 60%     ESOPHAGOSCOPY, GASTROSCOPY, DUODENOSCOPY (EGD), COMBINED  5/23/2011    Procedure:COMBINED ESOPHAGOSCOPY, GASTROSCOPY, DUODENOSCOPY (EGD), BIOPSY SINGLE OR MULTIPLE; Surgeon:JOSE L FOOTE; Location:[facility]     HC CT HEAD WO CONTRAST       HC ESOPHAGOSCOPY, DIAGNOSTIC      Hiatal Hernia     HC HYSTEROSCOPY, SURGICAL; W/ ENDOMETRIAL ABLATION, ANY METHOD N/A      HC REMOVAL OF TONSILS,<11 Y/O      Tonsils <11y.o.     HYSTEROSCOPY  5/09    with endometrial ablation     MOHS MICROGRAPHIC PROCEDURE       PELVIS LAPAROSCOPY,DX  1996    Laparoscopy, benign fatty tumor LLQ     SURGICAL HISTORY OF -   11/2005    [facility] Blepharoplasty     SURGICAL HISTORY OF -   9/2006    [facility] Breast Reduction       Social History:  Patient  reports that she has quit smoking. Her smoking use included cigarettes. She has a 1.00 pack-year smoking history. She has never used smokeless tobacco. She reports that she drinks alcohol. She reports that she does not use drugs.    Family History:  Family History    Problem Relation Age of Onset     C.A.D. Mother         mild heart attack 59     Colon Polyps Mother      Thyroid Disease Mother      Diabetes Maternal Grandmother      Cancer - colorectal Maternal Grandmother      Thyroid Disease Maternal Grandmother      Colon Cancer Maternal Grandmother      Skin Cancer Maternal Grandmother      Alcohol/Drug Father      Blood Disease Maternal Grandfather          leukemia     Cancer - colorectal Brother 49             Myocardial Infarction Maternal Aunt 60     Myocardial Infarction Maternal Uncle 70     Colon Cancer Other         half brother     Crohn's Disease No family hx of      Ulcerative Colitis No family hx of      Anesthesia Reaction No family hx of      Melanoma No family hx of        Medications:  Current Outpatient Medications   Medication Sig Dispense Refill     acetaminophen (TYLENOL) 325 MG tablet Take 325-650 mg by mouth every 6 hours as needed for mild pain       ALPRAZolam (XANAX) 0.25 MG tablet Take 1 tablet (0.25 mg) by mouth 3 times daily as needed for anxiety 20 tablet 0     estradiol (ESTRACE VAGINAL) 0.1 MG/GM cream Place 2 g vaginally twice a week 85 g 0     levothyroxine (SYNTHROID/LEVOTHROID) 75 MCG tablet Take 1 tablet (75 mcg) by mouth daily 90 tablet 1     MAGNESIUM PO Take 1,000 mg by mouth daily       sertraline (ZOLOFT) 50 MG tablet Take 1.5 tablets (75 mg) by mouth daily 135 tablet 0     Aspirin (ASPIR-81 PO) Take 81 mg by mouth as needed       atorvastatin (LIPITOR) 10 MG tablet Take 1 tablet (10 mg) by mouth daily (Patient not taking: Reported on 2019) 90 tablet 3     fexofenadine (ALLEGRA) 180 MG tablet Take by mouth as needed Reported on 2017       Omeprazole Magnesium (PRILOSEC OTC PO) Take by mouth as needed        order for DME Equipment being ordered: CPAP and equipment 1 unit marking on an U-100 insulin syringe 1     rizatriptan (MAXALT-MLT) 10 MG disintegrating tablet Take 1 tablet by mouth at onset of headache for  migraine (Repeat at two hours if headache not entirely abated). May repeat dose in 2 hours.  Do not exceed 30 mg in 24 hours 12 tablet 6     valsartan (DIOVAN) 160 MG tablet TAKE 1 TABLET BY MOUTH EVERY DAY (Patient not taking: Reported on 7/2/2019) 90 tablet 2        Allergies   Allergen Reactions     Latex Swelling and Rash     Issues with Latex Condoms in the past     Dust Mites      Mold      Pollen Extract      Sulfa Drugs      Rash, hives         Review of Systems:  - As per HPI  - Constitutional: Otherwise feeling well today, in usual state of health.  - HEENT: Patient denies nonhealing oral sores.  - Skin: As above in HPI. No additional skin concerns.    Physical exam:  GEN: This is a well developed, well-nourished female in no acute distress, in a pleasant mood.      SKIN: Full skin, which includes the head/face, both arms, chest, back, abdomen,both legs, genitalia and/or groin buttocks, digits and/or nails, was examined.  - There are dome shaped bright red papules on the abdomen and chest.   - There is no erythema, telangectasias, nodularity, or pigmentation on the scar on the upper lip.  - There are waxy stuck on tan to brown papules on the back and flank.  - No other lesions of concern on areas examined.     Impression/Plan:  1. History of nonmelanoma skin cancer, no clincial evidence of recurrence    Sun precaution was advised including the use of sun screens of SPF 30 or higher, sun protective clothing, and avoidance of tanning beds.    No further intervention required. Patient to report changes.    Reassurance as to today's exam findings    2. Cherry angioma(s)    No further intervention required. Patient to report changes.     3. Seborrheic keratosis, non irritated    Seborrheic keratosis: Discussed benign nature of lesions.    CC Referred MD Gene  No address on file on close of this encounter.    Follow-up in 1 year, earlier for new or changing lesions.     Dr. Ng staffed the patient.    Staff  Involved:  Resident (Candido Claros)/Staff (as above)    I have seen and examined this patient and agree with the assessment and plan as documented in the resident's note.    Sahil Ng MD  Dermatology Attending

## 2019-07-02 NOTE — NURSING NOTE
Dermatology Rooming Note    Sherrell Kothari's goals for this visit include:   Chief Complaint   Patient presents with     Skin Check     Skin check, no concerns noted.     Derm Problem     Hairloss, Sophia states when she washes her hair she notices more hair coming out.     Roya Smith LPN

## 2019-07-02 NOTE — PROGRESS NOTES
[facility] Dermatology Note      Dermatology Problem List:  1. Dysplastic nevi   - s/p excision  2. SCC, lip   - Removed in mid-2000's (1990s per patient)  3. Irritated Seborrheic Keratosis   - Cryotherapy  4. Hypertrophic scars   - s/p laser  5. Xerosis cutis   - Amlactin    Encounter Date: Jul 2, 2019    CC:   FBSE    History of Present Illness:  Ms. Sherrell Kothari is a 58 year old female who with a history of skin cancer presents for a full body skin exam. Last seen by Dr. Ng in 5/2017. No new obvious areas of concern. Unsure about her back as she cannot visualize it. Reports that she has made numerous trips to [place] and the [facility] as well as been increased gardening outside. Endorses using sunscreen and sun protective clothing. Denies any recent burns or sun tanning    Past Medical History:   Patient Active Problem List   Diagnosis     Dyspnea and respiratory abnormality     Chest pain     Pure hypercholesterolemia     Chondromalacia of patella     Mixed hyperlipidemia     Allergic rhinitis     Other type of migraine     Tubulovillous adenoma colon      Dizziness and giddiness     Anxiety state     Major depressive disorder, recurrent episode, in partial or unspecified remission     Bruxism     Neoplasm of uncertain behavior of skin     Xerosis cutis     History of skin cancer     Obesity     Abnormal glucose     Obstructive sleep apnea     Chronic nasal congestion     Hypothyroidism due to acquired atrophy of thyroid     Seborrheic keratoses, inflamed     History of squamous cell carcinoma     Past Medical History:   Diagnosis Date     Allergic rhinitis, cause unspecified     Allergic rhinitis     Carcinoma in situ of skin of other and unspecified parts of face     squamous cell skin cancer upper lip     Depressive disorder, not elsewhere classified      Diaphragmatic hernia without mention of obstruction or gangrene     hiatal hernia     Essential hypertension, benign       Helicobacter pylori (H. pylori)     TREATED     Hypersomnia with sleep apnea, unspecified     CPAP     Mixed hyperlipidemia      Other chest pain 8/04    non-cardiac, cardiology,  nl stress test     Other forms of migraine, without mention of intractable migraine without mention of status migrainosus     intermittent, formerly treated with Zomig     Squamous cell carcinoma     Mohs surgery     Syncope ER 12/09    NL echo, head CT     Temporomandibular disorder      Unspecified hypothyroidism      Past Surgical History:   Procedure Laterality Date     BIOPSY OF SKIN LESION       C ECHO HEART XTHORACIC,COMPLETE, W/O DOPPLER  12/09    EF 60%     ESOPHAGOSCOPY, GASTROSCOPY, DUODENOSCOPY (EGD), COMBINED  5/23/2011    Procedure:COMBINED ESOPHAGOSCOPY, GASTROSCOPY, DUODENOSCOPY (EGD), BIOPSY SINGLE OR MULTIPLE; Surgeon:JOSE L FOOTE; Location:[facility]     HC CT HEAD WO CONTRAST       HC ESOPHAGOSCOPY, DIAGNOSTIC      Hiatal Hernia     HC HYSTEROSCOPY, SURGICAL; W/ ENDOMETRIAL ABLATION, ANY METHOD N/A      HC REMOVAL OF TONSILS,<10 Y/O      Tonsils <11y.o.     HYSTEROSCOPY  5/09    with endometrial ablation     MOHS MICROGRAPHIC PROCEDURE       PELVIS LAPAROSCOPY,DX  1996    Laparoscopy, benign fatty tumor LLQ     SURGICAL HISTORY OF -   11/2005    [facility] Blepharoplasty     SURGICAL HISTORY OF -   9/2006    [facility] Breast Reduction       Social History:  Patient  reports that she has quit smoking. Her smoking use included cigarettes. She has a 1.00 pack-year smoking history. She has never used smokeless tobacco. She reports that she drinks alcohol. She reports that she does not use drugs.    Family History:  Family History   Problem Relation Age of Onset     C.A.D. Mother         mild heart attack 59     Colon Polyps Mother      Thyroid Disease Mother      Diabetes Maternal Grandmother      Cancer - colorectal Maternal Grandmother      Thyroid Disease Maternal Grandmother      Colon Cancer Maternal Grandmother      Skin Cancer  Maternal Grandmother      Alcohol/Drug Father      Blood Disease Maternal Grandfather          leukemia     Cancer - colorectal Brother 49             Myocardial Infarction Maternal Aunt 60     Myocardial Infarction Maternal Uncle 70     Colon Cancer Other         half brother     Crohn's Disease No family hx of      Ulcerative Colitis No family hx of      Anesthesia Reaction No family hx of      Melanoma No family hx of        Medications:  Current Outpatient Medications   Medication Sig Dispense Refill     acetaminophen (TYLENOL) 325 MG tablet Take 325-650 mg by mouth every 6 hours as needed for mild pain       ALPRAZolam (XANAX) 0.25 MG tablet Take 1 tablet (0.25 mg) by mouth 3 times daily as needed for anxiety 20 tablet 0     estradiol (ESTRACE VAGINAL) 0.1 MG/GM cream Place 2 g vaginally twice a week 85 g 0     levothyroxine (SYNTHROID/LEVOTHROID) 75 MCG tablet Take 1 tablet (75 mcg) by mouth daily 90 tablet 1     MAGNESIUM PO Take 1,000 mg by mouth daily       sertraline (ZOLOFT) 50 MG tablet Take 1.5 tablets (75 mg) by mouth daily 135 tablet 0     Aspirin (ASPIR-81 PO) Take 81 mg by mouth as needed       atorvastatin (LIPITOR) 10 MG tablet Take 1 tablet (10 mg) by mouth daily (Patient not taking: Reported on 2019) 90 tablet 3     fexofenadine (ALLEGRA) 180 MG tablet Take by mouth as needed Reported on 2017       Omeprazole Magnesium (PRILOSEC OTC PO) Take by mouth as needed        order for DME Equipment being ordered: CPAP and equipment 1 unit marking on an U-100 insulin syringe 1     rizatriptan (MAXALT-MLT) 10 MG disintegrating tablet Take 1 tablet by mouth at onset of headache for migraine (Repeat at two hours if headache not entirely abated). May repeat dose in 2 hours.  Do not exceed 30 mg in 24 hours 12 tablet 6     valsartan (DIOVAN) 160 MG tablet TAKE 1 TABLET BY MOUTH EVERY DAY (Patient not taking: Reported on 2019) 90 tablet 2        Allergies   Allergen Reactions     Latex  Swelling and Rash     Issues with Latex Condoms in the past     Dust Mites      Mold      Pollen Extract      Sulfa Drugs      Rash, hives         Review of Systems:  - As per HPI  - Constitutional: Otherwise feeling well today, in usual state of health.  - HEENT: Patient denies nonhealing oral sores.  - Skin: As above in HPI. No additional skin concerns.    Physical exam:  GEN: This is a well developed, well-nourished female in no acute distress, in a pleasant mood.      SKIN: Full skin, which includes the head/face, both arms, chest, back, abdomen,both legs, genitalia and/or groin buttocks, digits and/or nails, was examined.  - There are dome shaped bright red papules on the abdomen and chest.   - There is no erythema, telangectasias, nodularity, or pigmentation on the scar on the upper lip.  - There are waxy stuck on tan to brown papules on the back and flank.  - No other lesions of concern on areas examined.     Impression/Plan:  1. History of nonmelanoma skin cancer, no clincial evidence of recurrence    Sun precaution was advised including the use of sun screens of SPF 30 or higher, sun protective clothing, and avoidance of tanning beds.    No further intervention required. Patient to report changes.    Reassurance as to today's exam findings    2. Cherry angioma(s)    No further intervention required. Patient to report changes.     3. Seborrheic keratosis, non irritated    Seborrheic keratosis: Discussed benign nature of lesions.    CC Referred MD Gene  No address on file on close of this encounter.    Follow-up in 1 year, earlier for new or changing lesions.     Dr. Ng staffed the patient.    Staff Involved:  Resident (Candido Claros)/Staff (as above)    I have seen and examined this patient and agree with the assessment and plan as documented in the resident's note.    Sahil Ng MD  Dermatology Attending

## 2019-07-21 PROBLEM — L82.1 SK (SEBORRHEIC KERATOSIS): Status: ACTIVE | Noted: 2019-07-21

## 2019-09-23 DIAGNOSIS — F43.23 ADJUSTMENT DISORDER WITH MIXED ANXIETY AND DEPRESSED MOOD: ICD-10-CM

## 2019-09-24 DIAGNOSIS — N95.2 VAGINAL ATROPHY: ICD-10-CM

## 2019-09-24 NOTE — TELEPHONE ENCOUNTER
sertraline (ZOLOFT) 50 MG tablet      Last Written Prescription Date:  6/29/19  Last Fill Quantity: 135,   # refills: 0  Last Office Visit : 11/20/18  Future Office visit: none  30 day pended.    VERA Ashley for appointment and PHQ9  Routing  because:  PHQ9 and 6 month follow up

## 2019-09-25 RX ORDER — ESTRADIOL 0.1 MG/G
2 CREAM VAGINAL
Qty: 42.5 G | Refills: 0 | Status: SHIPPED | OUTPATIENT
Start: 2019-09-26 | End: 2020-04-10

## 2019-09-25 NOTE — TELEPHONE ENCOUNTER
estradiol (ESTRACE VAGINAL) 0.1 MG/GM cream      Last Written Prescription Date:  10/22/18  Last Fill Quantity: 85g,   # refills: 0  Last Office Visit : 11/20/18  Future Office visit:  none  Refill to pharmacy.

## 2019-10-05 ENCOUNTER — HEALTH MAINTENANCE LETTER (OUTPATIENT)
Age: 59
End: 2019-10-05

## 2019-12-03 ENCOUNTER — ANCILLARY PROCEDURE (OUTPATIENT)
Dept: GENERAL RADIOLOGY | Facility: CLINIC | Age: 59
End: 2019-12-03
Attending: PODIATRIST
Payer: COMMERCIAL

## 2019-12-03 ENCOUNTER — OFFICE VISIT (OUTPATIENT)
Dept: INTERNAL MEDICINE | Facility: CLINIC | Age: 59
End: 2019-12-03
Payer: COMMERCIAL

## 2019-12-03 ENCOUNTER — PRE VISIT (OUTPATIENT)
Dept: ORTHOPEDICS | Facility: CLINIC | Age: 59
End: 2019-12-03

## 2019-12-03 ENCOUNTER — OFFICE VISIT (OUTPATIENT)
Dept: ORTHOPEDICS | Facility: CLINIC | Age: 59
End: 2019-12-03
Attending: NURSE PRACTITIONER
Payer: COMMERCIAL

## 2019-12-03 VITALS
OXYGEN SATURATION: 98 % | BODY MASS INDEX: 37.21 KG/M2 | DIASTOLIC BLOOD PRESSURE: 85 MMHG | RESPIRATION RATE: 18 BRPM | HEIGHT: 63 IN | WEIGHT: 210 LBS | HEART RATE: 102 BPM | SYSTOLIC BLOOD PRESSURE: 128 MMHG

## 2019-12-03 DIAGNOSIS — M25.572 PAIN IN JOINT, ANKLE AND FOOT, LEFT: ICD-10-CM

## 2019-12-03 DIAGNOSIS — M25.572 ACUTE LEFT ANKLE PAIN: Primary | ICD-10-CM

## 2019-12-03 DIAGNOSIS — E74.39 GLUCOSE INTOLERANCE: ICD-10-CM

## 2019-12-03 DIAGNOSIS — I10 ESSENTIAL HYPERTENSION: ICD-10-CM

## 2019-12-03 DIAGNOSIS — M76.829 PTTD (POSTERIOR TIBIAL TENDON DYSFUNCTION): ICD-10-CM

## 2019-12-03 DIAGNOSIS — G47.33 OSA (OBSTRUCTIVE SLEEP APNEA): ICD-10-CM

## 2019-12-03 DIAGNOSIS — E03.9 ACQUIRED HYPOTHYROIDISM: Primary | ICD-10-CM

## 2019-12-03 DIAGNOSIS — E78.2 MIXED HYPERLIPIDEMIA: ICD-10-CM

## 2019-12-03 DIAGNOSIS — M25.572 ACUTE LEFT ANKLE PAIN: ICD-10-CM

## 2019-12-03 ASSESSMENT — ENCOUNTER SYMPTOMS
BACK PAIN: 0
HEARTBURN: 1
SKIN CHANGES: 1
COUGH: 1
JOINT SWELLING: 1
CONSTIPATION: 0
ARTHRALGIAS: 1
SINUS CONGESTION: 1
TASTE DISTURBANCE: 0
SINUS PAIN: 1
NECK PAIN: 0
NAUSEA: 0
MUSCLE CRAMPS: 1
DYSPNEA ON EXERTION: 0
BRUISES/BLEEDS EASILY: 0
MYALGIAS: 0
NAIL CHANGES: 0
EYE WATERING: 0
EYE PAIN: 0
WHEEZING: 0
DIARRHEA: 0
DOUBLE VISION: 0
SNORES LOUDLY: 0
SMELL DISTURBANCE: 0
ABDOMINAL PAIN: 0
BLOATING: 1
VOMITING: 0
SHORTNESS OF BREATH: 0
HEMOPTYSIS: 0
SWOLLEN GLANDS: 1
SPUTUM PRODUCTION: 1

## 2019-12-03 ASSESSMENT — ANXIETY QUESTIONNAIRES
5. BEING SO RESTLESS THAT IT IS HARD TO SIT STILL: NOT AT ALL
1. FEELING NERVOUS, ANXIOUS, OR ON EDGE: NOT AT ALL
2. NOT BEING ABLE TO STOP OR CONTROL WORRYING: NOT AT ALL
GAD7 TOTAL SCORE: 0
3. WORRYING TOO MUCH ABOUT DIFFERENT THINGS: NOT AT ALL
IF YOU CHECKED OFF ANY PROBLEMS ON THIS QUESTIONNAIRE, HOW DIFFICULT HAVE THESE PROBLEMS MADE IT FOR YOU TO DO YOUR WORK, TAKE CARE OF THINGS AT HOME, OR GET ALONG WITH OTHER PEOPLE: NOT DIFFICULT AT ALL
6. BECOMING EASILY ANNOYED OR IRRITABLE: NOT AT ALL
7. FEELING AFRAID AS IF SOMETHING AWFUL MIGHT HAPPEN: NOT AT ALL

## 2019-12-03 ASSESSMENT — PATIENT HEALTH QUESTIONNAIRE - PHQ9
5. POOR APPETITE OR OVEREATING: NOT AT ALL
SUM OF ALL RESPONSES TO PHQ QUESTIONS 1-9: 4

## 2019-12-03 ASSESSMENT — PAIN SCALES - GENERAL: PAINLEVEL: MILD PAIN (3)

## 2019-12-03 ASSESSMENT — MIFFLIN-ST. JEOR: SCORE: 1496.68

## 2019-12-03 NOTE — PROGRESS NOTES
[facility]  Primary Care Center   Mabel GASTONLew Enoctiffanie, MANDA CNP  12/03/2019      Chief Complaint:   Musculoskeletal Problem and URI     History of Present Illness:   Sherrell Kothari is a 58 year old female with a history of allergic rhinitis, hypertension, hyperlipidemia, LORELEI, and depression who presents for evaluation of URI and swelling in the left lower extremity.     Her left medial ankle has been swollen for over one month. She recalls that several years back, she had an ultrasound showing venus insufficiency after fainting during a work-out class. She also had a broken metatarsal bone repaired 2 years go. She was also concerned about possible diabetes and checked her BG with her grandfather's glucometer -- fasting BG was 110. She does not recall tripping or falling. She additionally has new (~ 2 days) intermittent 1/10 left groin pain at night and a callus on her second toe, despite no friction in her shoes. She has pain with pointing her toes and inversion of her ankle. She has been off the foot a lot since she was recently on vacation and has had a URI since 11/12 She has elevated the foot and iced it for 30 minutes for treatment.     She notes swelling behind her knee when she walks often as well.     Other Concerns Discussed:   1. CPAP - Her prior sleep medicine provider left and she was told she needs a prescription before receiving new CPAP supplies.   2. Colonoscopy - She has a significant family history of colon cancer, a personal history of colon polyps, and has regular colonoscopies. Last one in April 2018 revealed removal of 7 polyps and she was recommended to repeat in 3 years.      Review of Systems:   Pertinent items are noted in HPI or as in patient entered ROS below, remainder of complete ROS is negative.     Active Medications:      levothyroxine (SYNTHROID/LEVOTHROID) 75 MCG tablet, Take 1 tablet (75 mcg) by mouth daily, Disp: 90 tablet, Rfl: 1     sertraline (ZOLOFT) 50 MG tablet, Take  1.5 tablets (75 mg) by mouth daily Patient needs to see primary provider for further refills., Disp: 135 tablet, Rfl: 0     acetaminophen (TYLENOL) 325 MG tablet, Take 325-650 mg by mouth every 6 hours as needed for mild pain, Disp: , Rfl:      Aspirin (ASPIR-81 PO), Take 81 mg by mouth as needed, Disp: , Rfl:      fexofenadine (ALLEGRA) 180 MG tablet, Take by mouth as needed Reported on 5/9/2017, Disp: , Rfl:      MAGNESIUM PO, Take 1,000 mg by mouth daily, Disp: , Rfl:      Omeprazole Magnesium (PRILOSEC OTC PO), Take by mouth as needed , Disp: , Rfl:      Allergies:   Latex; Dust mites; Mold; Pollen extract; and Sulfa drugs      Past Medical History:  Allergic rhinitis  Chronic nasal congestion  Dyspnea and respiratory abnormality   Obstructive sleep apnea  Hypersomnia with sleep apnea   Essential hypertension, benign  Mixed hyperlipidemia   Pure hypercholesterolemia   Other chest pain   Squamous cell carcinoma, upper lip   Xerosis cutis  Depressive disorder  Anxiety state  Helicobacter pylori   Tubulovillous adenoma colon   Diaphragmatic hernia without mention of obstruction or gangrene - hiatal hernia  Migraines    Syncope   Dizziness and giddiness  Temporomandibular disorder  Bruxism  Hypothyroidism due to acquired atrophy of thyroid  Obesity   Abnormal glucose  Chondromalacia of patella     Past Surgical History:  Biopsy of skin lesion  C echo heart xthoracic, complete, w/o doppler - EF 60%  Esophagoscopy, diagnostic - hiatal hernia  Hysteroscopy, surgical; w/ endometrial ablation, any method  Tonsillectomy   Hysteroscopy with endometrial ablation  Mohs micrographic procedure  Pelvis laparoscopy, DX - laparoscopy, benign fatty tumor LLQ  Bilateral blepharoplasty   Bilateral breast reduction     Family History:   Mother - CAD, mild heart attack at age 59, colon polyps, thyroid disease  Maternal grandmother - type 2 diabetes, colorectal cancer, thyroid disease, skin cancer  Maternal grandfather -  "leukemia  Father - alcohol/drug  Brother, younger - colorectal cancer, diagnosed 2013  Maternal family history of colon polyps     Immunizations:  Immunization History   Administered Date(s) Administered     DTAP (<7y) 10/08/1984, 09/09/1986     Influenza (IIV3) PF 11/15/2005, 11/15/2007, 09/15/2014     Influenza Intranasal Vaccine 4 valent 11/10/2018     Influenza Vaccine, 3 YRS +, IM (QUADRIVALENT W/PRESERVATIVES) 09/10/2019     Poliovirus, inactivated (IPV) 11/16/1987     TD (ADULT, 7+) 03/16/2005     TDAP Vaccine (Boostrix) 04/02/2015       Social History:   The patient was alone.   Smoking Status: Former smoker, 0.20 PPD for 5 years, quit age 17  Smokeless Tobacco: Never used   Alcohol Use: Yes  Marital Status:       Physical Exam:   /85 (BP Location: Right arm, Patient Position: Chair, Cuff Size: Adult Large)   Pulse 102   Resp 18   Ht 1.6 m (5' 3\")   Wt 95.3 kg (210 lb)   LMP 06/17/2013   SpO2 98%   Breastfeeding No   BMI 37.20 kg/m       Wt Readings from Last 1 Encounters:   12/03/19 95.3 kg (210 lb)     Constitutional: no distress, comfortable, pleasant   Eyes: anicteric, conjunctiva pink, normal extra-ocular movements   Ears, Nose and Throat: tympanic membranes clear, nose clear and free of lesions, throat clear.   Neck: supple with full range of motion, no thyromegaly.   Cardiovascular: regular rate and rhythm, normal S1 and S2, no murmurs, rubs or gallops, peripheral pulses full and symmetric   Respiratory: clear to auscultation, no wheezes or crackles, normal breath sounds   Gastrointestinal: positive bowel sounds, nontender, no hepatosplenomegaly, no masses   Breasts: no masses.  Musculoskeletal: full range of motion, slight swelling over the left medial malleolus. Discomfort aggravated by plantarflexion and eversion.  Skin: no concerning lesions, no jaundice, temp normal   Neurological:  normal speech, no tremor. A and O x 3, good historian.  Psychological: appropriate mood, good " eye contact, normal affect   Lymph: no axillary, cervical,  supraclavicular, infraclavicular or inguinal nodes.        Assessment and Plan:  Pain in joint, ankle and foot, left  Swelling and pain in her left ankle for about one month. I suspect this is a ligament issue, but would prefer her to see Dr. Poe for full podiatric evaluation of her left ankle, as well as the callus on her second toe.   - ORTHOPEDICS ADULT REFERRAL    Acquired hypothyroidism  WNL at 3.44 on 11/2018.   - TSH    Essential hypertension  - CBC with platelets  - Comprehensive metabolic panel    Mixed hyperlipidemia  Last checked in 11/2018.   - Lipid Profile    Glucose intolerance  Elevated at 6.5% in 11/2018. Recheck today.   - Hemoglobin A1c    LORELEI (obstructive sleep apnea)  Per patient request.   -     order for DME; Equipment being ordered: CPAP equipment: tubing, head strap, nasal pillows (size small) [employer] Respironics, and water reservoir    Future fasting labs ordered. Encouraged her to schedule a mammogram and her physical exam with me.     Follow-up: PRN     Total time spent 40 minutes.  More than 50% of the time spent with Ms. Kothari on counseling / coordinating her care.    Mabel HOLMAN CNP      Scribe Disclosure:  I, Luda Hernandez, am serving as a scribe to document services personally performed by MANDA Yanez CNP at this visit, based upon the provider's statements to me. All documentation has been reviewed by the aforementioned provider prior to being entered into the official medical record.     Portions of this medical record were completed by a scribe. UPON MY REVIEW AND AUTHENTICATION BY ELECTRONIC SIGNATURE, this confirms (a) I performed the applicable clinical services, and (b) the record is accurate.

## 2019-12-03 NOTE — TELEPHONE ENCOUNTER
RECORDS RECEIVED FROM: [facility], per Lauren, [facility]   DATE RECEIVED: Dec 3, 2019     NOTES STATUS DETAILS   OFFICE NOTE from referring provider Internal  Mabel Sandra APRN CNP   OFFICE NOTE from other specialist N/A    DISCHARGE SUMMARY from hospital N/A    DISCHARGE REPORT from the ER N/A    OPERATIVE REPORT N/A    MEDICATION LIST Internal    IMPLANT RECORD/STICKER N/A    LABS     CBC/DIFF N/A    CULTURES N/A    INJECTIONS DONE IN RADIOLOGY N/A    MRI N/A    CT SCAN N/A    XRAYS (IMAGES & REPORTS) N/A    TUMOR     PATHOLOGY  Slides & report N/A      12/03/19   6:01 PM   Pre-visit complete  Joi Glamm, CMA

## 2019-12-03 NOTE — LETTER
12/3/2019       RE: Sherrell Kothari  [address] [facility] [address]     Dear Colleague,    Thank you for referring your patient, Sherrell Kothari, to the [facility] at [facility] Please see a copy of my visit note below.    Date of Service: 12/3/2019    Chief Complaint:   Chief Complaint   Patient presents with     Consult     Pain, left ankle. 2nd and 3rd left toes are callused. Patient stated that this pain has been present for 1 month. Patient stated that the pain feels like an internal bruise and hurts most while walking.          HPI: Sherrell is a 58 year old female who presents today for further evaluation of left ankle pain.  Nature: sharp/aching    Location: left medial ankle    Duration: 2 months    Onset: no inciting trauma    Course: stable but not better    Aggravating/alleviating factors: walking and weight bearing aggravate. Rest Alleviates.     Previous Treatments: ACE wraps, which are helpful.     Review of Systems: No n/v/d/f/c/ns/sob/cp    PMH:   Past Medical History:   Diagnosis Date     Allergic rhinitis, cause unspecified     Allergic rhinitis     Carcinoma in situ of skin of other and unspecified parts of face     squamous cell skin cancer upper lip     Depressive disorder, not elsewhere classified      Diaphragmatic hernia without mention of obstruction or gangrene     hiatal hernia     Essential hypertension, benign      Helicobacter pylori (H. pylori)     TREATED     Hypersomnia with sleep apnea, unspecified     CPAP     Mixed hyperlipidemia      Other chest pain 8/04    non-cardiac, cardiology,  nl stress test     Other forms of migraine, without mention of intractable migraine without mention of status migrainosus     intermittent, formerly treated with Zomig     Squamous cell carcinoma     Mohs surgery     Syncope ER 12/09    NL echo, head CT     Temporomandibular disorder      Unspecified hypothyroidism       PSxH:   Past Surgical History:   Procedure Laterality Date     BIOPSY OF SKIN LESION       C ECHO HEART XTHORACIC,COMPLETE, W/O DOPPLER  12/09    EF 60%     ESOPHAGOSCOPY, GASTROSCOPY, DUODENOSCOPY (EGD), COMBINED  5/23/2011    Procedure:COMBINED ESOPHAGOSCOPY, GASTROSCOPY, DUODENOSCOPY (EGD), BIOPSY SINGLE OR MULTIPLE; Surgeon:JOSE L FOOTE; Location:[facility]     HC CT HEAD WO CONTRAST       HC ESOPHAGOSCOPY, DIAGNOSTIC      Hiatal Hernia     HC HYSTEROSCOPY, SURGICAL; W/ ENDOMETRIAL ABLATION, ANY METHOD N/A      HC REMOVAL OF TONSILS,<13 Y/O      Tonsils <11y.o.     HYSTEROSCOPY  5/09    with endometrial ablation     MOHS MICROGRAPHIC PROCEDURE       PELVIS LAPAROSCOPY,DX  1996    Laparoscopy, benign fatty tumor LLQ     SURGICAL HISTORY OF -   11/2005    [facility] Blepharoplasty     SURGICAL HISTORY OF -   9/2006    [facility] Breast Reduction       Allergies: Latex; Dust mites; Mold; Pollen extract; and Sulfa drugs    SH:   Social History     Socioeconomic History     Marital status:      Spouse name: Jaziel     Number of children: 0     Years of education: Not on file     Highest education level: Not on file   Occupational History     Occupation: Ad. Ass't     Employer: [employer]   Social Needs     Financial resource strain: Not on file     Food insecurity:     Worry: Not on file     Inability: Not on file     Transportation needs:     Medical: Not on file     Non-medical: Not on file   Tobacco Use     Smoking status: Former Smoker     Packs/day: 0.20     Years: 5.00     Pack years: 1.00     Types: Cigarettes     Smokeless tobacco: Never Used     Tobacco comment: Quit at age 17, smoked from age [age]   Substance and Sexual Activity     Alcohol use: Yes     Alcohol/week: 0.0 standard drinks     Comment: 1-2 drinks a week     Drug use: No     Sexual activity: Yes     Partners: Male     Comment:  Jaziel   Lifestyle     Physical activity:     Days per week: Not on file     Minutes per  "session: Not on file     Stress: Not on file   Relationships     Social connections:     Talks on phone: Not on file     Gets together: Not on file     Attends [employer] service: Not on file     Active member of club or organization: Not on file     Attends meetings of clubs or organizations: Not on file     Relationship status: Not on file     Intimate partner violence:     Fear of current or ex partner: Not on file     Emotionally abused: Not on file     Physically abused: Not on file     Forced sexual activity: Not on file   Other Topics Concern     Parent/sibling w/ CABG, MI or angioplasty before 65F 55M? 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        Health Care Maintenance:    Last Pap: NIL    Vaccinations:up to date    TSH:2012 normal    Fasting glucose:elevated (104) in     Lipids:2012 normal    Mammogram:2012 Bi-rads one    Colonoscopy:? Unsure, believes she is due    Dexa:n/a     FH:   Family History   Problem Relation Age of Onset     C.A.D. Mother         mild heart attack 59     Colon Polyps Mother      Thyroid Disease Mother      Diabetes Maternal Grandmother      Cancer - colorectal Maternal Grandmother      Thyroid Disease Maternal Grandmother      Colon Cancer Maternal Grandmother      Skin Cancer Maternal Grandmother      Alcohol/Drug Father      Blood Disease Maternal Grandfather          leukemia     Cancer - colorectal Brother 49             Myocardial Infarction Maternal Aunt 60     Myocardial " Infarction Maternal Uncle 70     Colon Cancer Other         half brother     Crohn's Disease No family hx of      Ulcerative Colitis No family hx of      Anesthesia Reaction No family hx of      Melanoma No family hx of      Objective:  Data Unavailable Data Unavailable Data Unavailable Data Unavailable Data Unavailable 0 lbs 0 oz    PT and DP pulses are 2/4 bilaterally. CRT is instant. Positive pedal hair.   Gross sensation is intact bilaterally.   Equinus is noted bilaterally. Pain noted with palpation of the deltoid ligaments and with palpation of the course of the PT tendon on the left ankle about the medial malleolus. Some tenderness on the distal tip of the medial malleolus. Pain noted in the PT tendon around the malleolus with single heel raise. Everted calcanei BL with little inversion with heel raises.  Pain with palpation of the PTFL area, but no pain with palpation of the talar dome. Some edema noted about the medial malleolus. Negative anterior and posterior ankle drawers and talar tilt. Pain to the distal left 2nd digit.   Nails normal bilaterally. No open lesions are noted.     left ankle xrays indicated in 3 weightbearing views.  Some mild degenerative changes noted to the ankle joint medially. No acute processes noted. No increase to the medial clear space.       Assessment: Left PTTD.   Callus of the left 2nd toe.     Plan:  - Pt seen and evaluated.  - XRs taken and discussed with her.  - Tri-lock ankle brace dispensed. Wear daily.  - Silicone toe sleeve to the 2nd digit.   - Referral to PT.  - See again in 1 month.     Hema Poe DPM

## 2019-12-03 NOTE — NURSING NOTE
Chief Complaint   Patient presents with     Musculoskeletal Problem     pt. is here today due to a swollen ankle      URI     pt. states that she had a viral infection since 11/12       Edith Price, EMT

## 2019-12-03 NOTE — PATIENT INSTRUCTIONS
Primary Care Center Medication Refill Request Information:  * Please contact your pharmacy regarding ANY request for medication refills.  ** [facility] Prescription Fax = [phone]  * Please allow 3 business days for routine medication refills.  * Please allow 5 business days for controlled substance medication refills.     Primary Care Center Test Result notification information:  *You will be notified with in 7-10 days of your appointment day regarding the results of your test.  If you are on MyChart you will be notified as soon as the provider has reviewed the results and signed off on them.

## 2019-12-04 ENCOUNTER — TELEPHONE (OUTPATIENT)
Dept: INTERNAL MEDICINE | Facility: CLINIC | Age: 59
End: 2019-12-04

## 2019-12-04 DIAGNOSIS — E74.39 GLUCOSE INTOLERANCE: ICD-10-CM

## 2019-12-04 DIAGNOSIS — E78.00 ELEVATED CHOLESTEROL: ICD-10-CM

## 2019-12-04 DIAGNOSIS — I10 ESSENTIAL HYPERTENSION: ICD-10-CM

## 2019-12-04 DIAGNOSIS — E78.2 MIXED HYPERLIPIDEMIA: ICD-10-CM

## 2019-12-04 DIAGNOSIS — E03.9 ACQUIRED HYPOTHYROIDISM: ICD-10-CM

## 2019-12-04 LAB
ALBUMIN SERPL-MCNC: 3.7 G/DL (ref 3.4–5)
ALP SERPL-CCNC: 58 U/L (ref 40–150)
ALT SERPL W P-5'-P-CCNC: 66 U/L (ref 0–50)
ANION GAP SERPL CALCULATED.3IONS-SCNC: 6 MMOL/L (ref 3–14)
AST SERPL W P-5'-P-CCNC: 30 U/L (ref 0–45)
BILIRUB SERPL-MCNC: 0.3 MG/DL (ref 0.2–1.3)
BUN SERPL-MCNC: 17 MG/DL (ref 7–30)
CALCIUM SERPL-MCNC: 9 MG/DL (ref 8.5–10.1)
CHLORIDE SERPL-SCNC: 108 MMOL/L (ref 94–109)
CHOLEST SERPL-MCNC: 248 MG/DL
CO2 SERPL-SCNC: 24 MMOL/L (ref 20–32)
CREAT SERPL-MCNC: 0.87 MG/DL (ref 0.52–1.04)
ERYTHROCYTE [DISTWIDTH] IN BLOOD BY AUTOMATED COUNT: 13.2 % (ref 10–15)
GFR SERPL CREATININE-BSD FRML MDRD: 73 ML/MIN/{1.73_M2}
GLUCOSE SERPL-MCNC: 114 MG/DL (ref 70–99)
HBA1C MFR BLD: 6.1 % (ref 0–5.6)
HCT VFR BLD AUTO: 43.4 % (ref 35–47)
HDLC SERPL-MCNC: 54 MG/DL
HGB BLD-MCNC: 13.9 G/DL (ref 11.7–15.7)
LDLC SERPL CALC-MCNC: 171 MG/DL
MCH RBC QN AUTO: 29.5 PG (ref 26.5–33)
MCHC RBC AUTO-ENTMCNC: 32 G/DL (ref 31.5–36.5)
MCV RBC AUTO: 92 FL (ref 78–100)
NONHDLC SERPL-MCNC: 195 MG/DL
PLATELET # BLD AUTO: 297 10E9/L (ref 150–450)
POTASSIUM SERPL-SCNC: 4 MMOL/L (ref 3.4–5.3)
PROT SERPL-MCNC: 7.7 G/DL (ref 6.8–8.8)
RBC # BLD AUTO: 4.71 10E12/L (ref 3.8–5.2)
SODIUM SERPL-SCNC: 138 MMOL/L (ref 133–144)
TRIGL SERPL-MCNC: 120 MG/DL
TSH SERPL DL<=0.005 MIU/L-ACNC: 3.68 MU/L (ref 0.4–4)
WBC # BLD AUTO: 8.8 10E9/L (ref 4–11)

## 2019-12-04 ASSESSMENT — ANXIETY QUESTIONNAIRES: GAD7 TOTAL SCORE: 0

## 2019-12-04 NOTE — PROGRESS NOTES
Date of Service: 12/3/2019    Chief Complaint:   Chief Complaint   Patient presents with     Consult     Pain, left ankle. 2nd and 3rd left toes are callused. Patient stated that this pain has been present for 1 month. Patient stated that the pain feels like an internal bruise and hurts most while walking.          HPI: Sherrell is a 58 year old female who presents today for further evaluation of left ankle pain.  Nature: sharp/aching    Location: left medial ankle    Duration: 2 months    Onset: no inciting trauma    Course: stable but not better    Aggravating/alleviating factors: walking and weight bearing aggravate. Rest Alleviates.     Previous Treatments: ACE wraps, which are helpful.     Review of Systems: No n/v/d/f/c/ns/sob/cp    PMH:   Past Medical History:   Diagnosis Date     Allergic rhinitis, cause unspecified     Allergic rhinitis     Carcinoma in situ of skin of other and unspecified parts of face     squamous cell skin cancer upper lip     Depressive disorder, not elsewhere classified      Diaphragmatic hernia without mention of obstruction or gangrene     hiatal hernia     Essential hypertension, benign      Helicobacter pylori (H. pylori)     TREATED     Hypersomnia with sleep apnea, unspecified     CPAP     Mixed hyperlipidemia      Other chest pain 8/04    non-cardiac, cardiology,  nl stress test     Other forms of migraine, without mention of intractable migraine without mention of status migrainosus     intermittent, formerly treated with Zomig     Squamous cell carcinoma     Mohs surgery     Syncope ER 12/09    NL echo, head CT     Temporomandibular disorder      Unspecified hypothyroidism        PSxH:   Past Surgical History:   Procedure Laterality Date     BIOPSY OF SKIN LESION       C ECHO HEART XTHORACIC,COMPLETE, W/O DOPPLER  12/09    EF 60%     ESOPHAGOSCOPY, GASTROSCOPY, DUODENOSCOPY (EGD), COMBINED  5/23/2011    Procedure:COMBINED ESOPHAGOSCOPY, GASTROSCOPY, DUODENOSCOPY (EGD),  BIOPSY SINGLE OR MULTIPLE; Surgeon:JOSE L FOOTE; Location:[facility]     HC CT HEAD WO CONTRAST       HC ESOPHAGOSCOPY, DIAGNOSTIC      Hiatal Hernia     HC HYSTEROSCOPY, SURGICAL; W/ ENDOMETRIAL ABLATION, ANY METHOD N/A      HC REMOVAL OF TONSILS,<13 Y/O      Tonsils <11y.o.     HYSTEROSCOPY  5/09    with endometrial ablation     MOHS MICROGRAPHIC PROCEDURE       PELVIS LAPAROSCOPY,DX  1996    Laparoscopy, benign fatty tumor LLQ     SURGICAL HISTORY OF -   11/2005    [facility] Blepharoplasty     SURGICAL HISTORY OF -   9/2006    [facility] Breast Reduction       Allergies: Latex; Dust mites; Mold; Pollen extract; and Sulfa drugs    SH:   Social History     Socioeconomic History     Marital status:      Spouse name: Jaziel     Number of children: 0     Years of education: Not on file     Highest education level: Not on file   Occupational History     Occupation: Ad. Ass't     Employer: [employer]   Social Needs     Financial resource strain: Not on file     Food insecurity:     Worry: Not on file     Inability: Not on file     Transportation needs:     Medical: Not on file     Non-medical: Not on file   Tobacco Use     Smoking status: Former Smoker     Packs/day: 0.20     Years: 5.00     Pack years: 1.00     Types: Cigarettes     Smokeless tobacco: Never Used     Tobacco comment: Quit at age 17, smoked from age [age]   Substance and Sexual Activity     Alcohol use: Yes     Alcohol/week: 0.0 standard drinks     Comment: 1-2 drinks a week     Drug use: No     Sexual activity: Yes     Partners: Male     Comment:  Jaziel   Lifestyle     Physical activity:     Days per week: Not on file     Minutes per session: Not on file     Stress: Not on file   Relationships     Social connections:     Talks on phone: Not on file     Gets together: Not on file     Attends [employer] service: Not on file     Active member of club or organization: Not on file     Attends meetings of clubs or organizations: Not on  "file     Relationship status: Not on file     Intimate partner violence:     Fear of current or ex partner: Not on file     Emotionally abused: Not on file     Physically abused: Not on file     Forced sexual activity: Not on file   Other Topics Concern     Parent/sibling w/ CABG, MI or angioplasty before 65F 55M? 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        Health Care Maintenance:    Last Pap: NIL    Vaccinations:up to date    TSH:2012 normal    Fasting glucose:elevated (104) in     Lipids:2012 normal    Mammogram:2012 Bi-rads one    Colonoscopy:? Unsure, believes she is due    Dexa:n/a                           FH:   Family History   Problem Relation Age of Onset     C.A.D. Mother         mild heart attack 59     Colon Polyps Mother      Thyroid Disease Mother      Diabetes Maternal Grandmother      Cancer - colorectal Maternal Grandmother      Thyroid Disease Maternal Grandmother      Colon Cancer Maternal Grandmother      Skin Cancer Maternal Grandmother      Alcohol/Drug Father      Blood Disease Maternal Grandfather          leukemia     Cancer - colorectal Brother 49             Myocardial Infarction Maternal Aunt 60     Myocardial Infarction Maternal Uncle 70     Colon Cancer Other         half brother     Crohn's Disease No family hx of      Ulcerative Colitis No family hx of      Anesthesia Reaction No family hx of      Melanoma No family hx of        Objective:  Data Unavailable Data Unavailable Data " Unavailable Data Unavailable Data Unavailable 0 lbs 0 oz    PT and DP pulses are 2/4 bilaterally. CRT is instant. Positive pedal hair.   Gross sensation is intact bilaterally.   Equinus is noted bilaterally. Pain noted with palpation of the deltoid ligaments and with palpation of the course of the PT tendon on the left ankle about the medial malleolus. Some tenderness on the distal tip of the medial malleolus. Pain noted in the PT tendon around the malleolus with single heel raise. Everted calcanei BL with little inversion with heel raises.  Pain with palpation of the PTFL area, but no pain with palpation of the talar dome. Some edema noted about the medial malleolus. Negative anterior and posterior ankle drawers and talar tilt. Pain to the distal left 2nd digit.   Nails normal bilaterally. No open lesions are noted.     left ankle xrays indicated in 3 weightbearing views.  Some mild degenerative changes noted to the ankle joint medially. No acute processes noted. No increase to the medial clear space.       Assessment: Left PTTD.   Callus of the left 2nd toe.     Plan:  - Pt seen and evaluated.  - XRs taken and discussed with her.  - Tri-lock ankle brace dispensed. Wear daily.  - Silicone toe sleeve to the 2nd digit.   - Referral to PT.  - See again in 1 month.

## 2019-12-04 NOTE — TELEPHONE ENCOUNTER
ATORVASTATIN 10 MG TABLET      Last Written Prescription Date:  11/24/18  Last Fill Quantity: 90,   # refills: 3  Last Office Visit : 12/3/19 with lipids today  Future Office visit:  none    Routing refill request to provider for review/approval because:  Lipids today with LDL:  Lab Test 12/04/19  1011   *

## 2019-12-04 NOTE — TELEPHONE ENCOUNTER
RECORDS RECEIVED FROM: 1 month follow up.    DATE RECEIVED: Dec 31, 2019     NOTES STATUS DETAILS     12/04/19   10:40 AM   See 12/3/19 pre-visit  Joi Cruz CMA

## 2019-12-04 NOTE — NURSING NOTE
Reason For Visit:   Chief Complaint   Patient presents with     Consult     Pain, left ankle. 2nd and 3rd left toes are callused. Patient stated that this pain has been present for 1 month. Patient stated that the pain feels like an internal bruise and hurts most while walking.         Pain Assessment  Patient Currently in Pain: Yes  Primary Pain Location: Ankle(Left )        Allergies   Allergen Reactions     Latex Swelling and Rash     Issues with Latex Condoms in the past     Dust Mites      Mold      Pollen Extract      Sulfa Drugs      Rash, hives             Julianne Benavides LPN

## 2019-12-05 ENCOUNTER — DOCUMENTATION ONLY (OUTPATIENT)
Dept: CARE COORDINATION | Facility: CLINIC | Age: 59
End: 2019-12-05

## 2019-12-06 ENCOUNTER — DOCUMENTATION ONLY (OUTPATIENT)
Dept: SLEEP MEDICINE | Facility: CLINIC | Age: 59
End: 2019-12-06

## 2019-12-06 NOTE — PROGRESS NOTES
PT CAME TO [facility] TO BE FITTED FOR A DIFFERENT MASK.  PT STATED SHE WAS LOOKING AT THE YUSUF MASK THAT SITS UNDER THE NOSE.  SHOWED HER BOTH MASKS WE HAVE THAT SIT UNDER THE NOSE AND TALKED ABOUT THE SIMILARITIES AND DIFFERENCES.  PT TRIALED THE DREAMWEAR NASAL, MED FRAME W SM CUSHION- STATED THIS WAS COMFORTABLE, DID NOT WANT TO TRY MASK WITH MACHINE PRESSURES APPLIED.  TRIALED THE AIRFIT N30I, STD FRAME W/ SM CUSHION- STATED THIS WAS COMFORTABLE AND LIKED THE STRAP AT THE BACK OF HEAD, STATED SHE THOUGHT THIS WOULD STAY IN PLACE BETTER.  TOLD PT SHE WILL BE ELIGIBLE FOR A MASK ON 6/4/20 SINCE SHE JUST GOT ONE ON 11/4.  PT STATED SHE KNOWS THIS AND WOULD LIKE US TO SHIP THE AIRFIT N30I STD WHEN SHE IS ELIGIBLE.

## 2019-12-06 NOTE — TELEPHONE ENCOUNTER
Mabel,    Please review the recent lab results and let me know if you want to adjust Lipitor dosage. Thank you.    Edward

## 2019-12-10 DIAGNOSIS — E78.00 ELEVATED LDL CHOLESTEROL LEVEL: Primary | ICD-10-CM

## 2019-12-10 RX ORDER — ATORVASTATIN CALCIUM 10 MG/1
TABLET, FILM COATED ORAL
Qty: 90 TABLET | Refills: 1 | Status: SHIPPED | OUTPATIENT
Start: 2019-12-10 | End: 2022-06-28

## 2019-12-10 RX ORDER — ATORVASTATIN CALCIUM 10 MG/1
10 TABLET, FILM COATED ORAL DAILY
Qty: 90 TABLET | Refills: 3 | OUTPATIENT
Start: 2019-12-10

## 2019-12-11 ENCOUNTER — THERAPY VISIT (OUTPATIENT)
Dept: PHYSICAL THERAPY | Facility: CLINIC | Age: 59
End: 2019-12-11
Attending: PODIATRIST
Payer: COMMERCIAL

## 2019-12-11 DIAGNOSIS — M25.572 ACUTE LEFT ANKLE PAIN: ICD-10-CM

## 2019-12-11 DIAGNOSIS — M76.829 PTTD (POSTERIOR TIBIAL TENDON DYSFUNCTION): ICD-10-CM

## 2019-12-11 DIAGNOSIS — F43.23 ADJUSTMENT DISORDER WITH MIXED ANXIETY AND DEPRESSED MOOD: ICD-10-CM

## 2019-12-11 DIAGNOSIS — E03.4 HYPOTHYROIDISM DUE TO ACQUIRED ATROPHY OF THYROID: ICD-10-CM

## 2019-12-11 PROCEDURE — 97110 THERAPEUTIC EXERCISES: CPT | Mod: GP | Performed by: PHYSICAL THERAPIST

## 2019-12-11 PROCEDURE — 97161 PT EVAL LOW COMPLEX 20 MIN: CPT | Mod: GP | Performed by: PHYSICAL THERAPIST

## 2019-12-11 RX ORDER — LEVOTHYROXINE SODIUM 75 UG/1
75 TABLET ORAL DAILY
Qty: 90 TABLET | Refills: 3 | Status: SHIPPED | OUTPATIENT
Start: 2019-12-11 | End: 2021-02-19

## 2019-12-11 NOTE — PROGRESS NOTES
Sherrell Kothari is a 58 year old with a L ankle complaint.  Symptoms commenced as a result of: hurts on medial side of ankle/foot. Unsure why it started. Painful with moving in in a variety of directions.    Condition occurred in the following environment: home.   Onset of symptoms: 9/11/19.   Location of symptoms: entire medial side of foot/ankle.   Pain level on number scale: 3/10.   Quality of pain: achy.   Associated symptoms: none.   Pain frequency (constant/intermittent): intermittent.   Symptoms are exacerbated by: inverting ankle, walking, weightbearing.   Symptoms are relieved by: unsure.  Progression of symptoms since onset (same/better/worse): same.   Special tests (x-ray, MRI, CT scan, EMG, bone scan): x-ray.  General health as reported by patient is good.  Pertinent medical history includes:  See Epic.   Medical allergies:  see Epic.   Other pertinent surgeries:  see Epic.   Current medications: See Epic.   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   Patient is (working in normal job without restrictions/working in normal job with restrictions/working in an alternate job/not working due to present treatment problem): working in normal job without restrictions.   Primary job tasks: driving, prolonged sitting.   Barriers at home/work: None reported by patient.   Red flags: None reported by patient.    Objective:  ANKLE EVALUATION    Static Posture  Toes touching at rest: Yes  Arch: moderate collapse with gait  Toes In/Out: slightly out    Dynamic Movement Screen  Single leg stance observations: Difficulty with balance eyes open L > R  Double limb squat observations: Not assessed  Single limb squat observations: Not assessed  Gait: No significant findings    Ankle Range of Motion  Ankle AROM Dorsiflexion Plantarflexion Inversion Eversion KTW (cm)   Left Mild loss WNL WNL WNL NA   Right Mild loss WNl WNL WNL NA   **KTW (knee to wall)- distance between wall and great toe where pt can touch knee to wall  and keep heel in contact with floor    Ankle Strength  Ankle MMT Dorsiflexion Plantarflexion Inversion Eversion   Left 5-/5 5-/5 5-/5 5-/5   Right 5-/5 5-/5 5-/5 5-/5     Foot Strength:   Right Left   Big Toe Isolated Extension 5-/5 5-/5   Short Foot NA NA   Toe Splays NA NA     Special Tests:  Ligament testing: No sig findings    Palpation  Left: Moderate tenderness to palpation at medial malleoli, deltoid ligament  Right: No tenderness to palpation    Assessment/Plan:    Patient has the following significant findings with corresponding treatment plan.                Diagnosis 1:  L ankle pain  Pain -  manual therapy, splint/taping/bracing/orthotics, self management, education and home program  Decreased ROM/flexibility - manual therapy and therapeutic exercise  Decreased joint mobility - manual therapy and therapeutic exercise  Decreased strength - therapeutic exercise and therapeutic activities  Impaired balance - neuro re-education and therapeutic activities  Decreased proprioception - neuro re-education and therapeutic activities  Impaired gait - gait training    Therapy Evaluation Codes:   1) Clinical presentation characteristics are:   Evolving/Changing.  2) Decision-Making    Moderate complexity using standardized patient assessment instrument and/or measureable assessment of functional outcome.  Cumulative Therapy Evaluation is: Moderate complexity.    Previous and current functional limitations:  (See Goal Flow Sheet for this information)    Short term and Long term goals: (See Goal Flow Sheet for this information)     Communication ability:  Patient appears to be able to clearly communicate and understand verbal and written communication and follow directions correctly.  Treatment Explanation - The following has been discussed with the patient:   RX ordered/plan of care  Anticipated outcomes  Possible risks and side effects  This patient would benefit from PT intervention to resume normal activities.    Rehab potential is fair.    Frequency:  1 X week, once daily  Duration:  for 8 weeks  Discharge Plan:  Achieve all LTG.  Independent in home treatment program.  Reach maximal therapeutic benefit.    Please refer to the daily flowsheet for treatment today, total treatment time and time spent performing 1:1 timed codes.

## 2019-12-11 NOTE — TELEPHONE ENCOUNTER
LEVOTHYROXINE 75 MCG TABLET   Last Written Prescription Date:  6/29/2019  Last Fill Quantity: 90,   # refills: 1  Last Office Visit : 12/3/2019  Future Office visit:  None  90 Tabs, 3 Refills sent to pharmacy 12/11/2019    SERTRALINE HCL 50 MG TABLET     Last Written Prescription Date:  9/24/2019  Last Fill Quantity: 135,   # refills: 0  Last Office Visit : 12/3/2019  Future Office visit:  None  135 Tabs, 3 Refills sent to pharmacy 12/11/2019    Review Flowsheet   PHQ-9 and GAD7 Scores  11/24/2015   2/26/2016   4/5/2018   11/20/2018   12/3/2019    PHQ-9 Total Score 11 6 8 8 4   PUJA-7 Total Score - 3 9 0 0    11/24/2015 2/26/2016 4/5/2018 11/20/2018 12/3/2019   Review Flowsheet           Valerie Glynn RN  Central Triage Red Flags/Med Refills

## 2019-12-31 ENCOUNTER — PRE VISIT (OUTPATIENT)
Dept: ORTHOPEDICS | Facility: CLINIC | Age: 59
End: 2019-12-31

## 2020-01-31 ENCOUNTER — TELEPHONE (OUTPATIENT)
Dept: SLEEP MEDICINE | Facility: CLINIC | Age: 60
End: 2020-01-31

## 2020-01-31 NOTE — TELEPHONE ENCOUNTER
"Received a voicemail from Sherrell who is requesting a prescription for a new CPAP device. As her current device is displaying a messages  \"Life Expectany Exceeded\".    Returned patient call 1/31/2020 and left a voicemail to please return call as pt will need to schedule a appointment with one of our sleep providers.    Pt has not been seen in 5 years.     David Campbell  Sleep Clinic Specialist - [place]    "

## 2020-02-10 ENCOUNTER — HEALTH MAINTENANCE LETTER (OUTPATIENT)
Age: 60
End: 2020-02-10

## 2020-02-13 PROBLEM — M25.572 ACUTE LEFT ANKLE PAIN: Status: RESOLVED | Noted: 2019-12-11 | Resolved: 2020-02-13

## 2020-04-06 DIAGNOSIS — N95.2 VAGINAL ATROPHY: ICD-10-CM

## 2020-04-10 RX ORDER — ESTRADIOL 0.1 MG/G
CREAM VAGINAL
Qty: 42.5 G | Refills: 0 | Status: SHIPPED | OUTPATIENT
Start: 2020-04-10 | End: 2020-11-16

## 2020-09-10 DIAGNOSIS — Z20.822 SUSPECTED COVID-19 VIRUS INFECTION: Primary | ICD-10-CM

## 2020-09-11 LAB
SARS-COV-2 RNA SPEC QL NAA+PROBE: NOT DETECTED
SPECIMEN SOURCE: NORMAL

## 2020-10-06 ENCOUNTER — TRANSFERRED RECORDS (OUTPATIENT)
Dept: HEALTH INFORMATION MANAGEMENT | Facility: CLINIC | Age: 60
End: 2020-10-06

## 2020-10-13 ENCOUNTER — TRANSFERRED RECORDS (OUTPATIENT)
Dept: HEALTH INFORMATION MANAGEMENT | Facility: CLINIC | Age: 60
End: 2020-10-13

## 2020-10-22 PROBLEM — K29.40 AUTOIMMUNE GASTRITIS: Status: ACTIVE | Noted: 2020-10-22

## 2020-11-14 ENCOUNTER — HEALTH MAINTENANCE LETTER (OUTPATIENT)
Age: 60
End: 2020-11-14

## 2020-11-16 ENCOUNTER — MYC REFILL (OUTPATIENT)
Dept: INTERNAL MEDICINE | Facility: CLINIC | Age: 60
End: 2020-11-16

## 2020-11-16 ENCOUNTER — MYC MEDICAL ADVICE (OUTPATIENT)
Dept: INTERNAL MEDICINE | Facility: CLINIC | Age: 60
End: 2020-11-16

## 2020-11-16 DIAGNOSIS — K29.00 ACUTE GASTRITIS WITHOUT HEMORRHAGE, UNSPECIFIED GASTRITIS TYPE: Primary | ICD-10-CM

## 2020-11-16 DIAGNOSIS — N95.2 VAGINAL ATROPHY: ICD-10-CM

## 2020-11-17 RX ORDER — ESTRADIOL 0.1 MG/G
CREAM VAGINAL
Qty: 42.5 G | Refills: 0 | Status: SHIPPED | OUTPATIENT
Start: 2020-11-19 | End: 2020-12-07

## 2020-11-17 NOTE — TELEPHONE ENCOUNTER
Last Clinic Visit: 12/3/2019  [facility]    Mammogram scheduled 11-

## 2020-11-19 ENCOUNTER — TELEPHONE (OUTPATIENT)
Dept: INTERNAL MEDICINE | Facility: CLINIC | Age: 60
End: 2020-11-19

## 2020-11-19 NOTE — TELEPHONE ENCOUNTER
[facility] Call Center    Phone Message    May a detailed message be left on voicemail: yes     Reason for Call: Other: pt sent a message and is still waiting to hear back from the clinic. Please call back to discuss the messages left for Mabel.  she left this on Tuesday.11/17/2020    Action Taken: Message routed to:  [facility]    Travel Screening: Not Applicable

## 2020-11-19 NOTE — TELEPHONE ENCOUNTER
Patient Mychart message was sent to Mabel since 11/17.  Still waiting for her response to message.

## 2020-11-23 DIAGNOSIS — Z12.31 VISIT FOR SCREENING MAMMOGRAM: ICD-10-CM

## 2020-11-23 PROCEDURE — 77063 BREAST TOMOSYNTHESIS BI: CPT | Mod: GC

## 2020-11-23 PROCEDURE — 77067 SCR MAMMO BI INCL CAD: CPT | Mod: GC

## 2020-11-27 NOTE — TELEPHONE ENCOUNTER
I will enter the labs but I have still not seen the letter from [facility].  I cannot write her a work excuse w/o seeing the letter.    Mabel HOLMAN, CNP

## 2020-11-30 DIAGNOSIS — K29.00 ACUTE GASTRITIS WITHOUT HEMORRHAGE, UNSPECIFIED GASTRITIS TYPE: ICD-10-CM

## 2020-11-30 LAB
ERYTHROCYTE [DISTWIDTH] IN BLOOD BY AUTOMATED COUNT: 13.8 % (ref 10–15)
ERYTHROCYTE [SEDIMENTATION RATE] IN BLOOD BY WESTERGREN METHOD: 12 MM/H (ref 0–30)
FOLATE SERPL-MCNC: 19.8 NG/ML
HCT VFR BLD AUTO: 44.2 % (ref 35–47)
HGB BLD-MCNC: 14.2 G/DL (ref 11.7–15.7)
MCH RBC QN AUTO: 29.4 PG (ref 26.5–33)
MCHC RBC AUTO-ENTMCNC: 32.1 G/DL (ref 31.5–36.5)
MCV RBC AUTO: 92 FL (ref 78–100)
PLATELET # BLD AUTO: 287 10E9/L (ref 150–450)
RBC # BLD AUTO: 4.83 10E12/L (ref 3.8–5.2)
WBC # BLD AUTO: 8.4 10E9/L (ref 4–11)

## 2020-11-30 PROCEDURE — 82607 VITAMIN B-12: CPT | Performed by: PATHOLOGY

## 2020-11-30 PROCEDURE — 85027 COMPLETE CBC AUTOMATED: CPT | Performed by: PATHOLOGY

## 2020-11-30 PROCEDURE — 36415 COLL VENOUS BLD VENIPUNCTURE: CPT | Performed by: PATHOLOGY

## 2020-11-30 PROCEDURE — 85652 RBC SED RATE AUTOMATED: CPT | Performed by: PATHOLOGY

## 2020-11-30 PROCEDURE — 82746 ASSAY OF FOLIC ACID SERUM: CPT | Mod: 90 | Performed by: PATHOLOGY

## 2020-11-30 PROCEDURE — 86340 INTRINSIC FACTOR ANTIBODY: CPT | Mod: 90 | Performed by: PATHOLOGY

## 2020-12-01 LAB
IF BLOCK AB SER QL RIA: NEGATIVE
VIT B12 SERPL-MCNC: 462 PG/ML (ref 193–986)

## 2020-12-03 ENCOUNTER — MEDICAL CORRESPONDENCE (OUTPATIENT)
Dept: HEALTH INFORMATION MANAGEMENT | Facility: CLINIC | Age: 60
End: 2020-12-03

## 2020-12-03 ENCOUNTER — MYC MEDICAL ADVICE (OUTPATIENT)
Dept: INTERNAL MEDICINE | Facility: CLINIC | Age: 60
End: 2020-12-03

## 2020-12-07 DIAGNOSIS — N95.2 VAGINAL ATROPHY: ICD-10-CM

## 2020-12-07 RX ORDER — ESTRADIOL 0.1 MG/G
CREAM VAGINAL
Qty: 42.5 G | Refills: 0 | Status: SHIPPED | OUTPATIENT
Start: 2020-12-07 | End: 2022-06-28

## 2020-12-08 ENCOUNTER — MYC MEDICAL ADVICE (OUTPATIENT)
Dept: INTERNAL MEDICINE | Facility: CLINIC | Age: 60
End: 2020-12-08

## 2020-12-09 DIAGNOSIS — K29.40 ATROPHIC GASTRITIS WITHOUT HEMORRHAGE: Primary | ICD-10-CM

## 2020-12-10 ENCOUNTER — VIRTUAL VISIT (OUTPATIENT)
Dept: INTERNAL MEDICINE | Facility: CLINIC | Age: 60
End: 2020-12-10
Payer: COMMERCIAL

## 2020-12-10 DIAGNOSIS — R05.9 COUGH: Primary | ICD-10-CM

## 2020-12-10 PROCEDURE — 99214 OFFICE O/P EST MOD 30 MIN: CPT | Mod: 95 | Performed by: NURSE PRACTITIONER

## 2020-12-10 NOTE — LETTER
Sherrell Kothari  [address] [facility] [address]  : 1960  MRN:  [id number]      December 10, 2020      Sherrell Driver is a patient followed in our clinic for primary care.  She is experiencing respiratory issues which are currently in the process of being evaluated.  She would benefit by limiting her possible exposures to Covid19 at this time.  It is advised that she avoid any unnecessary social exposure.            Mabel HOLMAN, CNP

## 2020-12-10 NOTE — PROGRESS NOTES
"Sherrell Kothari is a 59 year old female who is being evaluated via a billable telephone visit.      The patient has been notified of following:     \"This telephone visit will be conducted via a call between you and your physician/provider. We have found that certain health care needs can be provided without the need for a physical exam.  This service lets us provide the care you need with a short phone conversation.  If a prescription is necessary we can send it directly to your pharmacy.  If lab work is needed we can place an order for that and you can then stop by our lab to have the test done at a later time.    Telephone visits are billed at different rates depending on your insurance coverage. During this emergency period, for some insurers they may be billed the same as an in-person visit.  Please reach out to your insurance provider with any questions.    If during the course of the call the physician/provider feels a telephone visit is not appropriate, you will not be charged for this service.\"    Patient has given verbal consent for Telephone visit?  Yes    What phone number would you like to be contacted at? [phone]    How would you like to obtain your AVS? Jonny Gordon     Sherrell Kothari is a 59 year old female who presents via phone visit today for the following health issues:    BILL Cortes has questions about her evaluation/treatment for her GI issues.  Notes from [facility] stated he was to follow-up with [facility] as outpt to discuss further. She is concerned that she might be aspirating.  When she lies down she has to cough to clear her lungs. She is currently scheduled for a swallow test 12/16/21 through Dr. Munson at [facility]. .   She has been off omeprazole for approx 2 weeks .jayne has a container to collect a stool sample for H. Pylori.   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  SHe also helps care for her elderly father-in-law.       Objective      Vitals: No vitals were obtained today due to virtual visit.  PSYCH: Alert and oriented times 3; coherent speech, normal   rate and volume.Her affect is normal  RESP: No cough, no audible wheezing, able to talk in full sentences  Remainder of exam unable to be completed due to telephone visits      Assessment/Plan:  Sherrell was seen today for recheck medication.    Diagnoses and all orders for this visit:    Cough  -     XR Chest 2 Views; Future  Will f/u with results of CRX when available.     Swallow study as scheduled.     H.Pylori test as scheduled.     Mabel HOLMAN, CNP      Phone call duration: 25 minutes

## 2020-12-10 NOTE — LETTER
Sherrell Kothari  [address] [facility] [address]  : 1960  MRN:  [id number]      December 10, 2020      Sherrell Kothari is a patient followed in our clinic for primary care.  She is experiencing respiratory issues which are currently in the process of being evaluated.  She would benefit by limiting her possible exposures to Covid19 at this time.  It is advised that she avoid any unnecessary social exposure.            Mabel HOLMAN, CNP

## 2020-12-16 ENCOUNTER — HOSPITAL ENCOUNTER (OUTPATIENT)
Dept: GENERAL RADIOLOGY | Facility: CLINIC | Age: 60
End: 2020-12-16
Attending: OTOLARYNGOLOGY
Payer: COMMERCIAL

## 2020-12-16 ENCOUNTER — HOSPITAL ENCOUNTER (OUTPATIENT)
Dept: SPEECH THERAPY | Facility: CLINIC | Age: 60
End: 2020-12-16
Attending: OTOLARYNGOLOGY
Payer: COMMERCIAL

## 2020-12-16 DIAGNOSIS — R09.A2 FEELING OF FOREIGN BODY IN THROAT: ICD-10-CM

## 2020-12-16 DIAGNOSIS — K21.9 ESOPHAGEAL REFLUX: ICD-10-CM

## 2020-12-16 PROCEDURE — 92611 MOTION FLUOROSCOPY/SWALLOW: CPT | Mod: GN | Performed by: SPEECH-LANGUAGE PATHOLOGIST

## 2020-12-16 PROCEDURE — 74230 X-RAY XM SWLNG FUNCJ C+: CPT

## 2020-12-16 PROCEDURE — 92610 EVALUATE SWALLOWING FUNCTION: CPT | Mod: GN,59 | Performed by: SPEECH-LANGUAGE PATHOLOGIST

## 2020-12-16 NOTE — PROGRESS NOTES
"CLINICAL AND VIDEO SWALLOW STUDY       12/16/20 1400   General Information   Type Of Visit Initial   Start Of Care Date 12/10/20   Referring Physician Dr. Michael Balderas   Orders Evaluate And Treat   Orders Comment Video swallow   Medical Diagnosis Globus sensation, esophageal reflux   Onset Of Illness/injury Or Date Of Surgery 12/10/20   Hearing Functional for conversation in session   Pertinent History of Current Problem/OT: Additional Occupational Profile Info Patient arrives today reporting feeling of food and mucus getting stuck in her throat, maybe feeling of it sticking \"on a shelf\" and feeling that it contributes to coughing episodes, worsening after meals.  She underwent video swallow study in 4/2015 which indicated functional swallow with aspiration, and an upper GI study indicating GERD.  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    Respiratory Status Room air   Prior Level Of Function Swallowing   Prior Level Of Function Comment Regular diet with thin liquids - patient ate steak and eggs for lunch today.    Living Environment House/[facility]   Home/community Accessibility Comments (flowsheet Row) Independent   General Observations Patient is highly pleasant, cooperative.    Patient/family Goals To lessen sensation of food stuck, supress coughing   Fall Risk Screen   Have you fallen 2 or more times in the past year? No   Have you fallen and had an injury in the past year? No   Is patient a fall risk? No   Clinical Swallow Evaluation   Oral Musculature generally intact   Structural Abnormalities none present   Dentition present and adequate   Mucosal Quality good   Mandibular Strength and Mobility intact   Lingual Strength and Mobility WFL   Velar Elevation intact   Buccal Strength and Mobility intact   Laryngeal Function Cough;Throat clear;Swallow;Dry swallow palpated;Voicing initiated   Oral " Musculature Comments WFL   Additional Documentation Yes   Additional evaluation(s) completed today Yes   Rationale for completing additional evaluation Video swallow study indicated to assess oropharyngeal and cervical esophageal swallowing function.    Clinical Swallow Eval: Thin Liquid Texture Trial   Mode of Presentation, Thin Liquids cup;self-fed   Volume of Liquid or Food Presented 3 ox   Pharyngeal Phase of Swallow intact   Diagnostic Statement No overt Sx of aspiration   VFSS Evaluation   VFSS Additional Documentation Yes   VFSS Eval: Thin Liquid Texture Trial   Mode of Presentation, Thin Liquid cup;self-fed   Preparatory Phase WFL   Oral Phase, Thin Liquid WFL   Pharyngeal Phase, Thin Liquid WFL   Rosenbek's Penetration Aspiration Scale: Thin Liquid Trial Results 1 - no aspiration, contrast does not enter airway   Diagnostic Statement Functional swallow   VFSS Eval: Puree Solid Texture Trial   Mode of Presentation, Puree spoon;self-fed   Preparatory Phase WFL   Oral Phase, Puree WFL   Pharyngeal Phase, Puree WFL   Rosenbek's Penetration Aspiration Scale: Puree Food Trial Results 1 - no aspiration, contrast does not enter airway   Diagnostic Statement Functional swallow, bolus moves easily around cervical osteophyte   VFSS Eval: Semisolid Texture Trial   Mode of Presentation, Semisolid spoon;self-fed   Preparatory Phase WFL   Oral Phase, Semisolid WFL   Pharyngeal Phase, Semisolid WFL   Rosenbek's Penetration Aspiration Scale: Semisolid Food Trial Results 1 - no aspiration, contrast does not enter airway   Diagnostic Statement Functional swallow, bolus moves easily around cervical osteophyte   VFSS Eval: Solid Food Texture Trial   Mode of Presentation, Solid spoon;self-fed   Preparatory Phase WFL   Oral Phase, Solid WFL   Pharyngeal Phase, Solid WFL   Rosenbek's Penetration Aspiration Scale: Solid Food Trial Results 1 - no aspiration, contrast does not enter airway   Diagnostic Statement Functional swallow,  bolus moves easily around cervical osteophyte   Swallow Compensations   Swallow Compensations No compensations were used   Esophageal Phase of Swallow   Patient reports or presents with symptoms of esophageal dysphagia Yes   Esophageal comments Known GERD, hiatal hernia.  Patient reports hx of esophageal manometry.   Swallow Eval: Clinical Impressions   Skilled Criteria for Therapy Intervention No problems identified which require skilled intervention   Functional Assessment Scale (FAS) 7   Dysphagia Outcome Severity Scale (DAVID) Level 7 - DAVID   Treatment Diagnosis Functional oropharyngeal swallow   Diet texture recommendations Regular diet;Thin liquids   Recommended Feeding/Eating Techniques maintain upright posture during/after eating for 30 mins;other (see comments)  (Reflux precautions)   Rehab Potential good, to achieve stated therapy goals   Predicted Duration of Therapy Intervention (days/wks) n/a   Anticipated Discharge Disposition home   Risks and Benefits of Treatment have been explained. Yes   Patient, family and/or staff in agreement with Plan of Care Yes   Clinical Impression Comments Patient presents with functional oropharyngeal swallow, without any laryngeal penetration or aspiration observed throughout the course of the study.  Pharyngeal constriction was functional for food clearance and UES function was noted to be WNL, as well.  Patient's symptoms may correlate to esophageal stasis or dysmotility.  Records of motility study were not available to this SLP today.     Total Session Time   SLP Eval: oral/pharyngeal swallow function, clinical minutes (84754) 20   SLP Eval: VideoFluoroscopic Swallow function Minutes (44233) 40   Total Evaluation Time 60

## 2021-01-15 ENCOUNTER — HEALTH MAINTENANCE LETTER (OUTPATIENT)
Age: 61
End: 2021-01-15

## 2021-02-17 DIAGNOSIS — E03.4 HYPOTHYROIDISM DUE TO ACQUIRED ATROPHY OF THYROID: ICD-10-CM

## 2021-02-19 RX ORDER — LEVOTHYROXINE SODIUM 75 UG/1
75 TABLET ORAL DAILY
Qty: 90 TABLET | Refills: 0 | Status: SHIPPED | OUTPATIENT
Start: 2021-02-19 | End: 2021-05-18

## 2021-05-10 ENCOUNTER — TRANSFERRED RECORDS (OUTPATIENT)
Dept: HEALTH INFORMATION MANAGEMENT | Facility: CLINIC | Age: 61
End: 2021-05-10

## 2021-05-17 DIAGNOSIS — E03.4 HYPOTHYROIDISM DUE TO ACQUIRED ATROPHY OF THYROID: ICD-10-CM

## 2021-05-18 RX ORDER — LEVOTHYROXINE SODIUM 75 UG/1
75 TABLET ORAL DAILY
Qty: 90 TABLET | Refills: 0 | Status: SHIPPED | OUTPATIENT
Start: 2021-05-18 | End: 2021-08-13

## 2021-05-18 NOTE — TELEPHONE ENCOUNTER
Last Clinic Visit: 12/10/2020  [facility] [place]    Lab(s)- TSH past due.  [facility] refill of Levothyroxine was sent for a 90 day supply and FYI to [facility].     TSH   Date Value Ref Range Status   12/04/2019 3.68 0.40 - 4.00 mU/L Final

## 2021-05-25 ENCOUNTER — RECORDS - HEALTHEAST (OUTPATIENT)
Dept: ADMINISTRATIVE | Facility: CLINIC | Age: 61
End: 2021-05-25

## 2021-08-11 DIAGNOSIS — E03.4 HYPOTHYROIDISM DUE TO ACQUIRED ATROPHY OF THYROID: ICD-10-CM

## 2021-08-13 NOTE — TELEPHONE ENCOUNTER
LEVOTHYROXINE 75 MCG TABLET      Last Written Prescription Date:  5-18-21  Last Fill Quantity: 90,   # refills: 0  Last Office Visit : 12-10-20  Future Office visit:  none    Routing refill request to provider for review/approval because:  Overdue lab, TSH :  FYI to CMA  Previous 90 day nenita rf X2

## 2021-08-16 ENCOUNTER — MYC MEDICAL ADVICE (OUTPATIENT)
Dept: INTERNAL MEDICINE | Facility: CLINIC | Age: 61
End: 2021-08-16

## 2021-08-16 RX ORDER — LEVOTHYROXINE SODIUM 75 UG/1
75 TABLET ORAL DAILY
Qty: 90 TABLET | Refills: 0 | Status: SHIPPED | OUTPATIENT
Start: 2021-08-16 | End: 2021-12-21

## 2021-08-18 ENCOUNTER — LAB (OUTPATIENT)
Dept: LAB | Facility: CLINIC | Age: 61
End: 2021-08-18
Payer: COMMERCIAL

## 2021-08-18 DIAGNOSIS — E03.4 HYPOTHYROIDISM DUE TO ACQUIRED ATROPHY OF THYROID: ICD-10-CM

## 2021-08-18 LAB — TSH SERPL DL<=0.005 MIU/L-ACNC: 2.22 MU/L (ref 0.4–4)

## 2021-08-18 PROCEDURE — 36415 COLL VENOUS BLD VENIPUNCTURE: CPT | Performed by: PATHOLOGY

## 2021-08-18 PROCEDURE — 84443 ASSAY THYROID STIM HORMONE: CPT | Performed by: PATHOLOGY

## 2021-08-19 DIAGNOSIS — Z12.11 SCREENING FOR COLON CANCER: Primary | ICD-10-CM

## 2021-08-27 ENCOUNTER — TELEPHONE (OUTPATIENT)
Dept: GASTROENTEROLOGY | Facility: CLINIC | Age: 61
End: 2021-08-27

## 2021-08-27 DIAGNOSIS — Z11.59 ENCOUNTER FOR SCREENING FOR OTHER VIRAL DISEASES: ICD-10-CM

## 2021-08-27 NOTE — TELEPHONE ENCOUNTER
Screening Questions  1. Are you active on mychart? Yes    2. What insurance is in the chart? [facility]    2.  Ordering/Referring Provider: Mabel SIDDIQI    3. BMI 34.5    4. Are you on daily home oxygen? n    5. Do you have a history of difficult airway? Sleep apnea    6. Have you had a heart, lung, or liver transplant? n    7. Are you currently on dialysis? n    8. Have you had a stroke or Transient ischemic atttack (TIA) within 6 months? n    9. In the past 6 months, have you had any heart related issues including cardiomyopathy or heart attack?         If yes, did it require cardiac stenting or other implantable device?n    10. Do you have any implantable devices in your body (pacemaker, defib, LVAD)? n    11. Do you take nitroglycerin? If yes, how often? n    12. Are you currently taking any blood thinners?n    13. Are you a diabetic? n    14. (Females) Are you currently pregnant? n  If yes, how many weeks?    15. Have you had a procedure in the past that was difficult to tolerate with conscious sedation? Any allergies to Fentanyl or Versed nnicollea      16. Are you taking any scheduled prescription narcotics more than once daily? n    17. Do you have any chemical dependencies such as alcohol, street drugs, or methadone? n    18. Do you have any history of post-traumatic stress syndrome or mental health issues? n    19. Do you transfer independently? y    20.  Do you have any issues with constipation? y    21. Preferred Pharmacy for Pre Prescription [facility] [place].    Scheduling Details    Which Colonoscopy Prep was Sent?: Extended Prep.  Procedure Scheduled: Colonoscopy  Provider/Surgeon: Anu  Date of Procedure: 9/09  Location: [facility]  Caller (Please ask for phone number if not scheduled by patient): Sophia      Sedation Type: MAC  Conscious Sedation- Needs  for 6 hours after the procedure  MAC/General-Needs  for 24 hours after procedure    Pre-op Required at [facility], [place] and OR for MAC sedation:   (if yes advise patient they will need a pre-op prior to procedure)      Is patient on blood thinners? -n (If yes- inform patient to follow up with PCP or provider for follow up instructions)     Informed patient they will need an adult  y  Cannot take any type of public or medical transportation alone    Informed Patient of COVID Test Requirement y    Confirmed Nurse will call to complete assessment y    Additional comments: Patient wanted to go to the same place she had her last colonoscopy.

## 2021-08-31 ENCOUNTER — TELEPHONE (OUTPATIENT)
Dept: GASTROENTEROLOGY | Facility: OUTPATIENT CENTER | Age: 61
End: 2021-08-31

## 2021-08-31 NOTE — TELEPHONE ENCOUNTER
Is patient taking blood thinners/antiplatelet medications? No     Heart Disease? Denies     Lung Disease? Denies     Sleep Apnea? Cpap    Diabetic? Denies     Kidney Disease? Denies     Electronic Implantable Devices? Denies     PTSD? Denies     Prep instructions reviewed with patient? Instructions for extended prep,  policy, MAC sedation plan reviewed. Instructed patient to have someone stay with them for 24 hours post exam    : Yes    [facility]    Indication for Procedure: Screening colonoscopy    Referring Provider: Mabel Sandra APRN CNP    Arrival Time: 7:30 AM    COVID test? 9-8 [facility]    Karli Trujillo RN

## 2021-09-07 DIAGNOSIS — E03.4 HYPOTHYROIDISM DUE TO ACQUIRED ATROPHY OF THYROID: ICD-10-CM

## 2021-09-08 ENCOUNTER — LAB (OUTPATIENT)
Dept: LAB | Facility: CLINIC | Age: 61
End: 2021-09-08

## 2021-09-08 DIAGNOSIS — Z11.59 ENCOUNTER FOR SCREENING FOR OTHER VIRAL DISEASES: ICD-10-CM

## 2021-09-08 LAB — SARS-COV-2 RNA RESP QL NAA+PROBE: NEGATIVE

## 2021-09-08 PROCEDURE — U0005 INFEC AGEN DETEC AMPLI PROBE: HCPCS | Performed by: INTERNAL MEDICINE

## 2021-09-09 ENCOUNTER — TRANSFERRED RECORDS (OUTPATIENT)
Dept: HEALTH INFORMATION MANAGEMENT | Facility: CLINIC | Age: 61
End: 2021-09-09

## 2021-09-09 ENCOUNTER — DOCUMENTATION ONLY (OUTPATIENT)
Dept: GASTROENTEROLOGY | Facility: OUTPATIENT CENTER | Age: 61
End: 2021-09-09
Payer: COMMERCIAL

## 2021-09-09 DIAGNOSIS — Z12.11 SCREENING FOR COLON CANCER: ICD-10-CM

## 2021-09-09 PROCEDURE — 88305 TISSUE EXAM BY PATHOLOGIST: CPT | Mod: TC,ORL | Performed by: INTERNAL MEDICINE

## 2021-09-09 PROCEDURE — 88305 TISSUE EXAM BY PATHOLOGIST: CPT | Mod: 26 | Performed by: PATHOLOGY

## 2021-09-09 RX ORDER — LEVOTHYROXINE SODIUM 75 UG/1
75 TABLET ORAL DAILY
Qty: 90 TABLET | Refills: 0 | OUTPATIENT
Start: 2021-09-09

## 2021-09-10 ENCOUNTER — LAB REQUISITION (OUTPATIENT)
Dept: LAB | Facility: CLINIC | Age: 61
End: 2021-09-10
Payer: COMMERCIAL

## 2021-09-12 ENCOUNTER — HEALTH MAINTENANCE LETTER (OUTPATIENT)
Age: 61
End: 2021-09-12

## 2021-09-13 LAB
PATH REPORT.COMMENTS IMP SPEC: NORMAL
PATH REPORT.COMMENTS IMP SPEC: NORMAL
PATH REPORT.FINAL DX SPEC: NORMAL
PATH REPORT.GROSS SPEC: NORMAL
PATH REPORT.MICROSCOPIC SPEC OTHER STN: NORMAL
PATH REPORT.RELEVANT HX SPEC: NORMAL
PHOTO IMAGE: NORMAL

## 2021-12-02 ENCOUNTER — IMMUNIZATION (OUTPATIENT)
Dept: NURSING | Facility: CLINIC | Age: 61
End: 2021-12-02
Payer: COMMERCIAL

## 2021-12-02 DIAGNOSIS — Z23 HIGH PRIORITY FOR 2019-NCOV VACCINE: Primary | ICD-10-CM

## 2021-12-02 PROCEDURE — 0064A COVID-19,PF,MODERNA (18+ YRS BOOSTER .25ML): CPT

## 2021-12-02 PROCEDURE — 99207 PR NO CHARGE NURSE ONLY: CPT

## 2021-12-02 PROCEDURE — 91306 COVID-19,PF,MODERNA (18+ YRS BOOSTER .25ML): CPT

## 2021-12-18 DIAGNOSIS — E03.4 HYPOTHYROIDISM DUE TO ACQUIRED ATROPHY OF THYROID: ICD-10-CM

## 2021-12-21 RX ORDER — LEVOTHYROXINE SODIUM 75 UG/1
75 TABLET ORAL DAILY
Qty: 90 TABLET | Refills: 0 | Status: SHIPPED | OUTPATIENT
Start: 2021-12-21 | End: 2022-03-16

## 2021-12-21 NOTE — TELEPHONE ENCOUNTER
Last Clinic Visit: 12/10/2020  [facility] [place]    Scheduling has been notified to contact the pt for appointment.

## 2021-12-22 ENCOUNTER — MYC MEDICAL ADVICE (OUTPATIENT)
Dept: INTERNAL MEDICINE | Facility: CLINIC | Age: 61
End: 2021-12-22
Payer: COMMERCIAL

## 2021-12-22 NOTE — TELEPHONE ENCOUNTER
[facility] outreach to call [facility] to schedule annual with PCP as last visit was 12/10/2020

## 2022-02-27 ENCOUNTER — HEALTH MAINTENANCE LETTER (OUTPATIENT)
Age: 62
End: 2022-02-27

## 2022-03-14 DIAGNOSIS — E03.4 HYPOTHYROIDISM DUE TO ACQUIRED ATROPHY OF THYROID: ICD-10-CM

## 2022-03-16 NOTE — TELEPHONE ENCOUNTER
LEVOTHYROXINE 75 MCG TABLET      Last Written Prescription Date:  12/21/21  Last Fill Quantity: 90,   # refills: 0  Last Office Visit : 12/10/20  Future Office visit:  None scheduled    Routing refill request to provider for review/approval because:  Received 90 day nenita refill with last fill

## 2022-03-17 ENCOUNTER — TELEPHONE (OUTPATIENT)
Dept: INTERNAL MEDICINE | Facility: CLINIC | Age: 62
End: 2022-03-17
Payer: COMMERCIAL

## 2022-03-17 DIAGNOSIS — E03.4 HYPOTHYROIDISM DUE TO ACQUIRED ATROPHY OF THYROID: ICD-10-CM

## 2022-03-17 RX ORDER — LEVOTHYROXINE SODIUM 75 UG/1
75 TABLET ORAL DAILY
Qty: 30 TABLET | Refills: 0 | Status: SHIPPED | OUTPATIENT
Start: 2022-03-17 | End: 2022-03-18

## 2022-03-17 NOTE — TELEPHONE ENCOUNTER
Left a detailed message to the pt that I refill ed 30 day supply of levothyroxine today and she needs to be seen for further refills as her last visit was on 12/10/20. Scheduling phone number given.

## 2022-03-17 NOTE — TELEPHONE ENCOUNTER
[facility] Call Center    Phone Message    May a detailed message be left on voicemail: yes     Reason for Call: Other: Patient returning a call regarding medication refills, No encounter listed. per patient she would like to disucss this with a care team member prior to scheduling a medication follow up. Please call to discuss thank you.      Action Taken: Message routed to:  [facility]    Travel Screening: Not Applicable

## 2022-03-18 RX ORDER — LEVOTHYROXINE SODIUM 75 UG/1
75 TABLET ORAL DAILY
Qty: 90 TABLET | Refills: 1 | Status: SHIPPED | OUTPATIENT
Start: 2022-03-18 | End: 2022-07-18 | Stop reason: DRUGHIGH

## 2022-03-18 NOTE — TELEPHONE ENCOUNTER
[facility] Call Center    Phone Message    May a detailed message be left on voicemail: yes     Reason for Call: Other: The patient wanted to speak with the care team because she don't feel that a in clinic visit is needed for refills, follow up with patient to discuss thank you.     Action Taken: Message routed to:  [facility]    Travel Screening: Not Applicable

## 2022-03-18 NOTE — TELEPHONE ENCOUNTER
Spoke with pt and informed our clinic policy of refills. She does not like our policy as she cannot afford annual visit and she will look for another health care provider. I sent 90 day supply of levothyroxine per her request.

## 2022-06-23 ASSESSMENT — ENCOUNTER SYMPTOMS
POOR WOUND HEALING: 0
ARTHRALGIAS: 1
JOINT SWELLING: 0
NAIL CHANGES: 0
MYALGIAS: 0
NECK PAIN: 0
EYE REDNESS: 1
MUSCLE CRAMPS: 1
BACK PAIN: 0
SKIN CHANGES: 0
EYE IRRITATION: 1
DOUBLE VISION: 0
STIFFNESS: 1
MUSCLE WEAKNESS: 0
EYE WATERING: 0
EYE PAIN: 0

## 2022-06-28 ENCOUNTER — OFFICE VISIT (OUTPATIENT)
Dept: INTERNAL MEDICINE | Facility: CLINIC | Age: 62
End: 2022-06-28
Payer: COMMERCIAL

## 2022-06-28 VITALS
DIASTOLIC BLOOD PRESSURE: 76 MMHG | HEIGHT: 63 IN | RESPIRATION RATE: 16 BRPM | SYSTOLIC BLOOD PRESSURE: 127 MMHG | BODY MASS INDEX: 37.21 KG/M2 | HEART RATE: 92 BPM | OXYGEN SATURATION: 99 % | WEIGHT: 210 LBS

## 2022-06-28 DIAGNOSIS — N95.2 VAGINAL ATROPHY: ICD-10-CM

## 2022-06-28 DIAGNOSIS — R73.09 ELEVATED GLUCOSE: ICD-10-CM

## 2022-06-28 DIAGNOSIS — L98.9 SKIN LESION: ICD-10-CM

## 2022-06-28 DIAGNOSIS — E55.9 VITAMIN D DEFICIENCY: ICD-10-CM

## 2022-06-28 DIAGNOSIS — E78.2 MIXED HYPERLIPIDEMIA: ICD-10-CM

## 2022-06-28 DIAGNOSIS — Z13.1 SCREENING FOR DIABETES MELLITUS: ICD-10-CM

## 2022-06-28 DIAGNOSIS — Z13.0 SCREENING FOR DEFICIENCY ANEMIA: ICD-10-CM

## 2022-06-28 DIAGNOSIS — Z12.31 ENCOUNTER FOR SCREENING MAMMOGRAM FOR BREAST CANCER: Primary | ICD-10-CM

## 2022-06-28 PROCEDURE — 99396 PREV VISIT EST AGE 40-64: CPT | Performed by: NURSE PRACTITIONER

## 2022-06-28 RX ORDER — ESTRADIOL 0.1 MG/G
CREAM VAGINAL
Qty: 42.5 G | Refills: 3 | Status: SHIPPED | OUTPATIENT
Start: 2022-06-30 | End: 2023-02-09

## 2022-06-28 RX ORDER — ESTRADIOL 0.1 MG/G
CREAM VAGINAL
Qty: 42.5 G | Refills: 0 | Status: SHIPPED | OUTPATIENT
Start: 2022-06-30 | End: 2022-06-28

## 2022-06-28 NOTE — NURSING NOTE
Sherrell Kothari is a 61 year old female patient that presents today in clinic for the following:    Chief Complaint   Patient presents with     Physical     Pt comes into clinic for a physical     Medication Refill     Knee Pain     Derm Problem     The patient's allergies and medications were reviewed as noted. A set of vitals were recorded as noted without incident. The patient does not have any other questions for the provider.    Dunia Alcantar, EMT at 2:54 PM on 6/28/2022

## 2022-06-29 ENCOUNTER — ANCILLARY PROCEDURE (OUTPATIENT)
Dept: MAMMOGRAPHY | Facility: CLINIC | Age: 62
End: 2022-06-29
Attending: NURSE PRACTITIONER
Payer: COMMERCIAL

## 2022-06-29 DIAGNOSIS — Z12.31 ENCOUNTER FOR SCREENING MAMMOGRAM FOR BREAST CANCER: ICD-10-CM

## 2022-06-29 PROCEDURE — 77063 BREAST TOMOSYNTHESIS BI: CPT | Mod: GC | Performed by: RADIOLOGY

## 2022-06-29 PROCEDURE — 77067 SCR MAMMO BI INCL CAD: CPT | Mod: GC | Performed by: RADIOLOGY

## 2022-06-29 NOTE — PROGRESS NOTES
"S: Sherrell Kothari is a 61 year old female presents for her annual physical.    She has lost some weight by diet control.      She is walking for exercise.  She \"over did\" it gardening recently and developed  swelling behind her left knee.  It is accompanied by \"popping\" and cracking.    She uses estradiol vaginal cream once every three months or so.     Lesion on her left lateral nose which does not resolve over the past 2 years. . No pain.  Last colonoscopy 9/9/21.    Last mammo 12/16/20.  Last pap 2015.      She feels less stress since she stopped working.      Patient Active Problem List   Diagnosis     Dyspnea and respiratory abnormality     Chest pain     Pure hypercholesterolemia     Chondromalacia of patella     Mixed hyperlipidemia     Allergic rhinitis     Other type of migraine     Tubulovillous adenoma colon      Dizziness and giddiness     Anxiety state     Major depressive disorder, recurrent episode, in partial or unspecified remission     Bruxism     Neoplasm of uncertain behavior of skin     Xerosis cutis     History of skin cancer     Obesity     Abnormal glucose     Obstructive sleep apnea     Chronic nasal congestion     Hypothyroidism due to acquired atrophy of thyroid     Seborrheic keratoses, inflamed     Cherry angioma     History of squamous cell carcinoma     SK (seborrheic keratosis)     Autoimmune gastritis            Past Medical History:   Diagnosis Date     Allergic rhinitis, cause unspecified     Allergic rhinitis     Carcinoma in situ of skin of other and unspecified parts of face     squamous cell skin cancer upper lip     Depressive disorder, not elsewhere classified      Diaphragmatic hernia without mention of obstruction or gangrene     hiatal hernia     Essential hypertension, benign      Helicobacter pylori (H. pylori)     TREATED     Hypersomnia with sleep apnea, unspecified     CPAP     Mixed hyperlipidemia      Other chest pain 8/04    non-cardiac, cardiology,  nl stress " test     Other forms of migraine, without mention of intractable migraine without mention of status migrainosus     intermittent, formerly treated with Zomig     Squamous cell carcinoma     Mohs surgery     Syncope ER 12/09    NL echo, head CT     Temporomandibular disorder      Unspecified hypothyroidism             Past Surgical History:   Procedure Laterality Date     BIOPSY OF SKIN LESION       ESOPHAGOSCOPY, GASTROSCOPY, DUODENOSCOPY (EGD), COMBINED  5/23/2011    Procedure:COMBINED ESOPHAGOSCOPY, GASTROSCOPY, DUODENOSCOPY (EGD), BIOPSY SINGLE OR MULTIPLE; Surgeon:JOSE L FOOTE; Location:[facility]     HC CT HEAD WO CONTRAST       HC ESOPHAGOSCOPY, DIAGNOSTIC      Hiatal Hernia     HC HYSTEROSCOPY, SURGICAL; W/ ENDOMETRIAL ABLATION, ANY METHOD N/A      HC REMOVAL OF TONSILS,<11 Y/O      Tonsils <11y.o.     HYSTEROSCOPY  5/09    with endometrial ablation     MOHS MICROGRAPHIC PROCEDURE       PELVIS LAPAROSCOPY,DX  1996    Laparoscopy, benign fatty tumor LLQ     SURGICAL HISTORY OF -   11/2005    [facility] Blepharoplasty     SURGICAL HISTORY OF -   9/2006    [facility] Breast Reduction     ZZC ECHO HEART XTHORACIC,COMPLETE, W/O DOPPLER  12/09    EF 60%            Social History     Tobacco Use     Smoking status: Former Smoker     Packs/day: 0.20     Years: 5.00     Pack years: 1.00     Types: Cigarettes     Smokeless tobacco: Never Used     Tobacco comment: Quit at age 17, smoked from age [age]   Substance Use Topics     Alcohol use: Yes     Alcohol/week: 0.0 standard drinks     Comment: 1-2 drinks a week            Family History   Problem Relation Age of Onset     C.A.D. Mother         mild heart attack 59     Colon Polyps Mother      Thyroid Disease Mother      Diabetes Maternal Grandmother      Cancer - colorectal Maternal Grandmother      Thyroid Disease Maternal Grandmother      Colon Cancer Maternal Grandmother      Skin Cancer Maternal Grandmother      Alcohol/Drug Father      Blood Disease Maternal Grandfather           leukemia     Cancer - colorectal Brother 49             Myocardial Infarction Maternal Aunt 60     Myocardial Infarction Maternal Uncle 70     Colon Cancer Other         half brother     Crohn's Disease No family hx of      Ulcerative Colitis No family hx of      Anesthesia Reaction No family hx of      Melanoma No family hx of                Allergies   Allergen Reactions     Latex Swelling and Rash     Issues with Latex Condoms in the past     Dust Mites      Mold      Pollen Extract      Sulfa Drugs Rash     Rash, hives              Current Outpatient Medications   Medication Sig Dispense Refill     [START ON 2022] estradiol (ESTRACE) 0.1 MG/GM vaginal cream Place vaginally twice a week - 2 grams 42.5 g 3     fexofenadine (ALLEGRA) 180 MG tablet Take by mouth as needed Reported on 2017       levothyroxine (SYNTHROID/LEVOTHROID) 75 MCG tablet Take 1 tablet (75 mcg) by mouth daily 90 tablet 1     MAGNESIUM PO Take 1,000 mg by mouth daily       order for DME Equipment being ordered: CPAP equipment: tubing, head strap, nasal pillows (size small) Madsen Respironics, and water reservoir 1 each 0     order for DME Equipment being ordered: CPAP and equipment (Patient not taking: No sig reported) 1 unit marking on an U-100 insulin syringe 1       REVIEW OF SYSTEMS:  See above.  Dental UTD.         Answers for HPI/ROS submitted by the patient on 2022  General Symptoms: No  Skin Symptoms: Yes  HENT Symptoms: No  EYE SYMPTOMS: Yes  HEART SYMPTOMS: No  LUNG SYMPTOMS: No  INTESTINAL SYMPTOMS: No  URINARY SYMPTOMS: No  GYNECOLOGIC SYMPTOMS: No  BREAST SYMPTOMS: No  SKELETAL SYMPTOMS: Yes  BLOOD SYMPTOMS: No  NERVOUS SYSTEM SYMPTOMS: No  MENTAL HEALTH SYMPTOMS: No  Changes in hair: No  Changes in moles/birth marks: No  Itching: No  Rashes: No  Changes in nails: No  Acne: Yes  Hair in places you don't want it: No  Change in facial hair: Yes  Warts: No  Non-healing sores: No  Scarring: No  Flaking of  "skin: Yes  Color changes of hands/feet in cold : No  Sun sensitivity: No  Skin thickening: No  Eye pain: No  Vision loss: No  Dry eyes: Yes  Watery eyes: No  Eye bulging: No  Double vision: No  Flashing of lights: Yes  Spots: No  Floaters: Yes  Redness: Yes  Crossed eyes: No  Tunnel Vision: No  Yellowing of eyes: No  Eye irritation: Yes  Back pain: No  Muscle aches: No  Neck pain: No  Swollen joints: No  Joint pain: Yes  Bone pain: No  Muscle cramps: Yes  Muscle weakness: No  Joint stiffness: Yes  Bone fracture: No        O:   /76 (BP Location: Right arm, Patient Position: Sitting, Cuff Size: Adult Large)   Pulse 92   Resp 16   Ht 1.6 m (5' 3\")   Wt 95.3 kg (210 lb)   LMP 06/17/2013   SpO2 99%   BMI 37.20 kg/m    GENERAL APPEARANCE: healthy, alert and no distress  EYES: EOMI,  PERRL  HENT: ear canals and TM's normal and nose and mouth without ulcers or lesions  RESP: lungs clear to auscultation - no rales, rhonchi or wheezes  CV: regular rates and rhythm, normal S1 S2, no S3 or S4 and no murmur, click or rub   ABDOMEN:  soft, nontender, no HSM or masses and bowel sounds normal  NEURO: Grossly normal  MUSK: Full ROM.    SKIN: Left lateral nose lesion 2-mm raised lesion.  LYMPH: neg  cervical, supra and infraclavicular nodes.   EXT: warm.  Edema : none  PSYCHE: normal.    A/P:  Sherrell was seen today for physical, medication refill, knee pain and derm problem.    Diagnoses and all orders for this visit:    Encounter for screening mammogram for breast cancer  -     Mammogram, routine screening; Future  -     Comprehensive metabolic panel; Future    Vaginal atrophy  -     estradiol (ESTRACE) 0.1 MG/GM vaginal cream; Place vaginally twice a week - 2 grams    Mixed hyperlipidemia  -     Lipid panel reflex to direct LDL Fasting; Future  -     TSH; Future    Screening for diabetes mellitus    Screening for deficiency anemia  -     CBC with platelets; Future    Elevated glucose  -     Hemoglobin A1c; " Future    Vitamin D deficiency  -     Vitamin D Deficiency; Future    Skin lesion  -     Adult Dermatology Referral; Future    HCM        -     She will obtain future fating labs and return for mammo and pap and breast exam.          The patient voiced understanding of the information discussed and all questions were answered.     Total time spent today with this patient including chart review, exam time with patient and documentation :       Mabel HOLMAN, CNP

## 2022-07-12 ENCOUNTER — LAB (OUTPATIENT)
Dept: LAB | Facility: CLINIC | Age: 62
End: 2022-07-12

## 2022-07-12 ENCOUNTER — OFFICE VISIT (OUTPATIENT)
Dept: INTERNAL MEDICINE | Facility: CLINIC | Age: 62
End: 2022-07-12
Payer: COMMERCIAL

## 2022-07-12 VITALS
WEIGHT: 212 LBS | HEIGHT: 63 IN | RESPIRATION RATE: 16 BRPM | SYSTOLIC BLOOD PRESSURE: 125 MMHG | BODY MASS INDEX: 37.56 KG/M2 | OXYGEN SATURATION: 98 % | DIASTOLIC BLOOD PRESSURE: 76 MMHG | HEART RATE: 72 BPM

## 2022-07-12 DIAGNOSIS — E55.9 VITAMIN D DEFICIENCY: ICD-10-CM

## 2022-07-12 DIAGNOSIS — E78.2 MIXED HYPERLIPIDEMIA: ICD-10-CM

## 2022-07-12 DIAGNOSIS — Z12.4 SCREENING FOR CERVICAL CANCER: Primary | ICD-10-CM

## 2022-07-12 DIAGNOSIS — R73.09 ELEVATED GLUCOSE: ICD-10-CM

## 2022-07-12 DIAGNOSIS — D64.9 LOW HEMOGLOBIN: ICD-10-CM

## 2022-07-12 DIAGNOSIS — Z23 NEED FOR COVID-19 VACCINE: ICD-10-CM

## 2022-07-12 DIAGNOSIS — Z12.31 ENCOUNTER FOR SCREENING MAMMOGRAM FOR BREAST CANCER: ICD-10-CM

## 2022-07-12 DIAGNOSIS — Z13.0 SCREENING FOR DEFICIENCY ANEMIA: ICD-10-CM

## 2022-07-12 LAB
ALBUMIN SERPL-MCNC: 3.5 G/DL (ref 3.4–5)
ALP SERPL-CCNC: 58 U/L (ref 40–150)
ALT SERPL W P-5'-P-CCNC: 18 U/L (ref 0–50)
ANION GAP SERPL CALCULATED.3IONS-SCNC: 7 MMOL/L (ref 3–14)
AST SERPL W P-5'-P-CCNC: 14 U/L (ref 0–45)
BILIRUB SERPL-MCNC: 0.3 MG/DL (ref 0.2–1.3)
BUN SERPL-MCNC: 18 MG/DL (ref 7–30)
CALCIUM SERPL-MCNC: 8.7 MG/DL (ref 8.5–10.1)
CHLORIDE BLD-SCNC: 108 MMOL/L (ref 94–109)
CHOLEST SERPL-MCNC: 194 MG/DL
CO2 SERPL-SCNC: 26 MMOL/L (ref 20–32)
CREAT SERPL-MCNC: 1 MG/DL (ref 0.52–1.04)
DEPRECATED CALCIDIOL+CALCIFEROL SERPL-MC: 26 UG/L (ref 20–75)
ERYTHROCYTE [DISTWIDTH] IN BLOOD BY AUTOMATED COUNT: 17.6 % (ref 10–15)
FASTING STATUS PATIENT QL REPORTED: ABNORMAL
GFR SERPL CREATININE-BSD FRML MDRD: 63 ML/MIN/1.73M2
GLUCOSE BLD-MCNC: 106 MG/DL (ref 70–99)
HBA1C MFR BLD: 5.9 % (ref 0–5.6)
HCT VFR BLD AUTO: 35.4 % (ref 35–47)
HDLC SERPL-MCNC: 47 MG/DL
HGB BLD-MCNC: 10.8 G/DL (ref 11.7–15.7)
IRON SERPL-MCNC: 28 UG/DL (ref 35–180)
LDLC SERPL CALC-MCNC: 103 MG/DL
MCH RBC QN AUTO: 24.5 PG (ref 26.5–33)
MCHC RBC AUTO-ENTMCNC: 30.5 G/DL (ref 31.5–36.5)
MCV RBC AUTO: 81 FL (ref 78–100)
NONHDLC SERPL-MCNC: 147 MG/DL
PLATELET # BLD AUTO: 288 10E3/UL (ref 150–450)
POTASSIUM BLD-SCNC: 3.8 MMOL/L (ref 3.4–5.3)
PROT SERPL-MCNC: 7.1 G/DL (ref 6.8–8.8)
RBC # BLD AUTO: 4.4 10E6/UL (ref 3.8–5.2)
SODIUM SERPL-SCNC: 141 MMOL/L (ref 133–144)
TRANSFERRIN SERPL-MCNC: 356 MG/DL (ref 200–360)
TRIGL SERPL-MCNC: 222 MG/DL
TSH SERPL DL<=0.005 MIU/L-ACNC: 4.16 MU/L (ref 0.4–4)
WBC # BLD AUTO: 8 10E3/UL (ref 4–11)

## 2022-07-12 PROCEDURE — 83036 HEMOGLOBIN GLYCOSYLATED A1C: CPT | Performed by: PATHOLOGY

## 2022-07-12 PROCEDURE — 99214 OFFICE O/P EST MOD 30 MIN: CPT | Mod: 25 | Performed by: NURSE PRACTITIONER

## 2022-07-12 PROCEDURE — 85027 COMPLETE CBC AUTOMATED: CPT | Performed by: PATHOLOGY

## 2022-07-12 PROCEDURE — 36415 COLL VENOUS BLD VENIPUNCTURE: CPT | Performed by: PATHOLOGY

## 2022-07-12 PROCEDURE — 0064A COVID-19,PF,MODERNA (18+ YRS BOOSTER .25ML): CPT | Performed by: NURSE PRACTITIONER

## 2022-07-12 PROCEDURE — 84466 ASSAY OF TRANSFERRIN: CPT | Performed by: NURSE PRACTITIONER

## 2022-07-12 PROCEDURE — 80053 COMPREHEN METABOLIC PANEL: CPT | Performed by: PATHOLOGY

## 2022-07-12 PROCEDURE — 84443 ASSAY THYROID STIM HORMONE: CPT | Performed by: PATHOLOGY

## 2022-07-12 PROCEDURE — G0145 SCR C/V CYTO,THINLAYER,RESCR: HCPCS | Performed by: NURSE PRACTITIONER

## 2022-07-12 PROCEDURE — 83540 ASSAY OF IRON: CPT | Performed by: PATHOLOGY

## 2022-07-12 PROCEDURE — 82306 VITAMIN D 25 HYDROXY: CPT | Performed by: NURSE PRACTITIONER

## 2022-07-12 PROCEDURE — 91306 COVID-19,PF,MODERNA (18+ YRS BOOSTER .25ML): CPT | Performed by: NURSE PRACTITIONER

## 2022-07-12 PROCEDURE — 87624 HPV HI-RISK TYP POOLED RSLT: CPT | Performed by: NURSE PRACTITIONER

## 2022-07-12 PROCEDURE — 80061 LIPID PANEL: CPT | Performed by: PATHOLOGY

## 2022-07-12 NOTE — NURSING NOTE
Sherrell Kothari received the Covid Moderna booster vaccine in clinic today at the request of MANDA Evans CNP. The immunization site was cleaned with an alcohol prep wipe. The immunization was given without incident--see immunization list for administration details. No swelling or redness was observed at the site of injection after the immunization was given. Pt has no history of reaction to vaccines. Pt was advised to remain in [facility] lobby for 15 minutes in case of an averse reaction.      This service provided today was under the direct supervision of Dr. Olivera, who was available if needed.    Dunia Alcantar EMT at 10:34 AM on 7/12/2022

## 2022-07-12 NOTE — PATIENT INSTRUCTIONS
Pap smear  Covid booster, Moderna  Recommend Shingrix booster at local pharmacy.   Fit test downstairs.

## 2022-07-12 NOTE — PROGRESS NOTES
S: Sherrell Kothari is a 61 year old female who returns today for a pap, pelvic, breast exam.      Last pap 7 years ago.     She went to give blood 2 weeks ago and was declined due to a hemoglobin of 12.5. She has  A normal diet. She has been craving ice for some months.     She had a colonoscopy in 2021       Patient Active Problem List   Diagnosis     Dyspnea and respiratory abnormality     Chest pain     Pure hypercholesterolemia     Chondromalacia of patella     Mixed hyperlipidemia     Allergic rhinitis     Other type of migraine     Tubulovillous adenoma colon      Dizziness and giddiness     Anxiety state     Major depressive disorder, recurrent episode, in partial or unspecified remission     Bruxism     Neoplasm of uncertain behavior of skin     Xerosis cutis     History of skin cancer     Obesity     Abnormal glucose     Obstructive sleep apnea     Chronic nasal congestion     Hypothyroidism due to acquired atrophy of thyroid     Seborrheic keratoses, inflamed     Cherry angioma     History of squamous cell carcinoma     SK (seborrheic keratosis)     Autoimmune gastritis            Past Medical History:   Diagnosis Date     Allergic rhinitis, cause unspecified     Allergic rhinitis     Carcinoma in situ of skin of other and unspecified parts of face     squamous cell skin cancer upper lip     Depressive disorder, not elsewhere classified      Diaphragmatic hernia without mention of obstruction or gangrene     hiatal hernia     Essential hypertension, benign      Helicobacter pylori (H. pylori)     TREATED     Hypersomnia with sleep apnea, unspecified     CPAP     Mixed hyperlipidemia      Other chest pain 8/04    non-cardiac, cardiology,  nl stress test     Other forms of migraine, without mention of intractable migraine without mention of status migrainosus     intermittent, formerly treated with Zomig     Squamous cell carcinoma     Mohs surgery     Syncope ER 12/09    NL echo, head CT      Temporomandibular disorder      Unspecified hypothyroidism             Past Surgical History:   Procedure Laterality Date     BIOPSY OF SKIN LESION       ESOPHAGOSCOPY, GASTROSCOPY, DUODENOSCOPY (EGD), COMBINED  2011    Procedure:COMBINED ESOPHAGOSCOPY, GASTROSCOPY, DUODENOSCOPY (EGD), BIOPSY SINGLE OR MULTIPLE; Surgeon:JOSE L FOOTE; Location:[facility]     HC CT HEAD WO CONTRAST       HC ESOPHAGOSCOPY, DIAGNOSTIC      Hiatal Hernia     HC HYSTEROSCOPY, SURGICAL; W/ ENDOMETRIAL ABLATION, ANY METHOD N/A      HC REMOVAL OF TONSILS,<13 Y/O      Tonsils <11y.o.     HYSTEROSCOPY      with endometrial ablation     MOHS MICROGRAPHIC PROCEDURE       PELVIS LAPAROSCOPY,DX      Laparoscopy, benign fatty tumor LLQ     SURGICAL HISTORY OF -   2005    [facility] Blepharoplasty     SURGICAL HISTORY OF -   2006    [facility] Breast Reduction     ZZC ECHO HEART XTHORACIC,COMPLETE, W/O DOPPLER      EF 60%            Social History     Tobacco Use     Smoking status: Former Smoker     Packs/day: 0.20     Years: 5.00     Pack years: 1.00     Types: Cigarettes     Smokeless tobacco: Never Used     Tobacco comment: Quit at age 17, smoked from age [age]   Substance Use Topics     Alcohol use: Yes     Alcohol/week: 0.0 standard drinks     Comment: 1-2 drinks a week            Family History   Problem Relation Age of Onset     C.A.D. Mother         mild heart attack 59     Colon Polyps Mother      Thyroid Disease Mother      Diabetes Maternal Grandmother      Cancer - colorectal Maternal Grandmother      Thyroid Disease Maternal Grandmother      Colon Cancer Maternal Grandmother      Skin Cancer Maternal Grandmother      Alcohol/Drug Father      Blood Disease Maternal Grandfather          leukemia     Cancer - colorectal Brother 49        2013     Myocardial Infarction Maternal Aunt 60     Myocardial Infarction Maternal Uncle 70     Colon Cancer Other         half brother     Crohn's Disease No family hx of       Ulcerative Colitis No family hx of      Anesthesia Reaction No family hx of      Melanoma No family hx of                Allergies   Allergen Reactions     Latex Swelling and Rash     Issues with Latex Condoms in the past     Dust Mites      Mold      Pollen Extract      Sulfa Drugs Rash     Rash, hives              Current Outpatient Medications   Medication Sig Dispense Refill     estradiol (ESTRACE) 0.1 MG/GM vaginal cream Place vaginally twice a week - 2 grams 42.5 g 3     fexofenadine (ALLEGRA) 180 MG tablet Take by mouth as needed Reported on 5/9/2017       levothyroxine (SYNTHROID/LEVOTHROID) 75 MCG tablet Take 1 tablet (75 mcg) by mouth daily 90 tablet 1     MAGNESIUM PO Take 1,000 mg by mouth daily       order for DME Equipment being ordered: CPAP equipment: tubing, head strap, nasal pillows (size small) Amdsen Respironics, and water reservoir 1 each 0     order for DME Equipment being ordered: CPAP and equipment (Patient not taking: No sig reported) 1 unit marking on an U-100 insulin syringe 1       REVIEW OF SYSTEMS:  See above.    O:   LMP 06/17/2013   GENERAL APPEARANCE: healthy, alert and no distress  RESP: no distress.  CV:see vs  Lymphatic: no cervical, axillary or inguinal lymphadenopathy  Breasts: normal without suspicious masses, skin changes or axillary nodes, self exam is taught and encouraged.  Gentiourinary:Pelvic Exam:  Vulva: No external lesions, normal hair distribution, no adenopathy  Vagina: Moist, pink, no abnormal discharge, well rugated, no lesions  Cervix: Pap smear is taken, parous, smooth, pink, no visible lesions  Uterus: Normal size, anteverted, non-tender, mobile  Ovaries: No mass  LYMPH: neg axillary, cervical, supra and infraclavicular nodes.  EXT: warm.  Edema: none  PSYCHE: normal    A/P:  Sherrell was seen today for gyn exam and breast exam.    Diagnoses and all orders for this visit:    Screening for cervical cancer  -     Pap screen with HPV - recommended age 30 - 65  years    Low hemoglobin  -     IRON; Future  -     TRANSFERRIN; Future  -     Fecal cancer screen FIT; Future    Need for COVID-19 vaccine  -     COVID-19,PF,MODERNA (18+ YRS BOOSTER .25ML)      Recommend Shingrix booster at local pharmacy.   Fit test downstairs.  The patient voiced understanding of the information discussed and all questions were answered.     Total time spent today with this patient including chart review, exam time with patient and documentation : 30 minutes.      Mabel HOLMAN, CNP

## 2022-07-12 NOTE — NURSING NOTE
Sherrell Kothari is a 61 year old female patient that presents today in clinic for the following:    Chief Complaint   Patient presents with     Gyn Exam     Pt comes into clinic for pap and pelvic     Breast Exam     The patient's allergies and medications were reviewed as noted. A set of vitals were recorded as noted without incident. The patient does not have any other questions for the provider.    CHANDA De La Torre at 8:56 AM on 7/12/2022

## 2022-07-14 LAB
BKR LAB AP GYN ADEQUACY: NORMAL
BKR LAB AP GYN INTERPRETATION: NORMAL
BKR LAB AP HPV REFLEX: NORMAL
BKR LAB AP PREVIOUS ABNORMAL: NORMAL
PATH REPORT.COMMENTS IMP SPEC: NORMAL
PATH REPORT.COMMENTS IMP SPEC: NORMAL
PATH REPORT.RELEVANT HX SPEC: NORMAL

## 2022-07-14 PROCEDURE — 82274 ASSAY TEST FOR BLOOD FECAL: CPT | Performed by: NURSE PRACTITIONER

## 2022-07-15 LAB — HEMOCCULT STL QL IA: NEGATIVE

## 2022-07-18 DIAGNOSIS — E03.4 HYPOTHYROIDISM DUE TO ACQUIRED ATROPHY OF THYROID: ICD-10-CM

## 2022-07-18 DIAGNOSIS — D50.8 OTHER IRON DEFICIENCY ANEMIA: Primary | ICD-10-CM

## 2022-07-18 LAB
HUMAN PAPILLOMA VIRUS 16 DNA: NEGATIVE
HUMAN PAPILLOMA VIRUS 18 DNA: NEGATIVE
HUMAN PAPILLOMA VIRUS FINAL DIAGNOSIS: NORMAL
HUMAN PAPILLOMA VIRUS OTHER HR: NEGATIVE

## 2022-07-18 RX ORDER — LEVOTHYROXINE SODIUM 88 UG/1
88 TABLET ORAL DAILY
Qty: 90 TABLET | Refills: 3 | Status: SHIPPED | OUTPATIENT
Start: 2022-07-18 | End: 2023-02-09

## 2022-08-02 DIAGNOSIS — D64.9 ANEMIA, UNSPECIFIED TYPE: Primary | ICD-10-CM

## 2022-11-16 ENCOUNTER — MYC MEDICAL ADVICE (OUTPATIENT)
Dept: INTERNAL MEDICINE | Facility: CLINIC | Age: 62
End: 2022-11-16

## 2022-11-17 ENCOUNTER — MYC MEDICAL ADVICE (OUTPATIENT)
Dept: INTERNAL MEDICINE | Facility: CLINIC | Age: 62
End: 2022-11-17

## 2022-11-19 ENCOUNTER — HEALTH MAINTENANCE LETTER (OUTPATIENT)
Age: 62
End: 2022-11-19

## 2023-01-12 ENCOUNTER — TRANSFERRED RECORDS (OUTPATIENT)
Dept: HEALTH INFORMATION MANAGEMENT | Facility: CLINIC | Age: 63
End: 2023-01-12

## 2023-01-13 ENCOUNTER — TRANSFERRED RECORDS (OUTPATIENT)
Dept: HEALTH INFORMATION MANAGEMENT | Facility: CLINIC | Age: 63
End: 2023-01-13

## 2023-02-08 PROBLEM — I10 HYPERTENSION: Status: ACTIVE | Noted: 2023-02-08

## 2023-02-08 PROBLEM — L82.1 SK (SEBORRHEIC KERATOSIS): Status: RESOLVED | Noted: 2019-07-21 | Resolved: 2023-02-08

## 2023-02-09 ENCOUNTER — OFFICE VISIT (OUTPATIENT)
Dept: FAMILY MEDICINE | Facility: CLINIC | Age: 63
End: 2023-02-09
Payer: COMMERCIAL

## 2023-02-09 VITALS
BODY MASS INDEX: 36.88 KG/M2 | OXYGEN SATURATION: 96 % | WEIGHT: 216 LBS | HEIGHT: 64 IN | SYSTOLIC BLOOD PRESSURE: 134 MMHG | TEMPERATURE: 97.1 F | DIASTOLIC BLOOD PRESSURE: 87 MMHG | RESPIRATION RATE: 14 BRPM

## 2023-02-09 DIAGNOSIS — Z23 ENCOUNTER FOR IMMUNIZATION: Primary | ICD-10-CM

## 2023-02-09 DIAGNOSIS — E78.2 MIXED HYPERLIPIDEMIA: ICD-10-CM

## 2023-02-09 DIAGNOSIS — R73.03 PREDIABETES: ICD-10-CM

## 2023-02-09 DIAGNOSIS — E03.4 HYPOTHYROIDISM DUE TO ACQUIRED ATROPHY OF THYROID: ICD-10-CM

## 2023-02-09 DIAGNOSIS — E66.01 MORBID OBESITY (H): ICD-10-CM

## 2023-02-09 DIAGNOSIS — Z23 ENCOUNTER FOR IMMUNIZATION: ICD-10-CM

## 2023-02-09 DIAGNOSIS — N95.2 VAGINAL ATROPHY: ICD-10-CM

## 2023-02-09 DIAGNOSIS — Z13.0 SCREENING FOR DEFICIENCY ANEMIA: ICD-10-CM

## 2023-02-09 DIAGNOSIS — K59.09 CHRONIC CONSTIPATION: ICD-10-CM

## 2023-02-09 DIAGNOSIS — Z00.00 ROUTINE ADULT HEALTH MAINTENANCE: Primary | ICD-10-CM

## 2023-02-09 LAB
ANION GAP SERPL CALCULATED.3IONS-SCNC: 11 MMOL/L (ref 7–15)
BUN SERPL-MCNC: 16.4 MG/DL (ref 8–23)
CALCIUM SERPL-MCNC: 9.2 MG/DL (ref 8.8–10.2)
CHLORIDE SERPL-SCNC: 106 MMOL/L (ref 98–107)
CHOLEST SERPL-MCNC: 215 MG/DL
CREAT SERPL-MCNC: 0.92 MG/DL (ref 0.51–0.95)
DEPRECATED HCO3 PLAS-SCNC: 24 MMOL/L (ref 22–29)
ERYTHROCYTE [DISTWIDTH] IN BLOOD BY AUTOMATED COUNT: 15 % (ref 10–15)
GFR SERPL CREATININE-BSD FRML MDRD: 70 ML/MIN/1.73M2
GLUCOSE SERPL-MCNC: 100 MG/DL (ref 70–99)
HBA1C MFR BLD: 6.2 % (ref 0–5.6)
HCT VFR BLD AUTO: 34.3 % (ref 35–47)
HDLC SERPL-MCNC: 53 MG/DL
HGB BLD-MCNC: 10.6 G/DL (ref 11.7–15.7)
LDLC SERPL CALC-MCNC: 144 MG/DL
MCH RBC QN AUTO: 24.9 PG (ref 26.5–33)
MCHC RBC AUTO-ENTMCNC: 30.9 G/DL (ref 31.5–36.5)
MCV RBC AUTO: 81 FL (ref 78–100)
NONHDLC SERPL-MCNC: 162 MG/DL
PLATELET # BLD AUTO: 315 10E3/UL (ref 150–450)
POTASSIUM SERPL-SCNC: 4 MMOL/L (ref 3.4–5.3)
RBC # BLD AUTO: 4.26 10E6/UL (ref 3.8–5.2)
SODIUM SERPL-SCNC: 141 MMOL/L (ref 136–145)
TRIGL SERPL-MCNC: 91 MG/DL
TSH SERPL DL<=0.005 MIU/L-ACNC: 0.83 UIU/ML (ref 0.3–4.2)
WBC # BLD AUTO: 8.8 10E3/UL (ref 4–11)

## 2023-02-09 PROCEDURE — 90471 IMMUNIZATION ADMIN: CPT | Performed by: PHYSICIAN ASSISTANT

## 2023-02-09 PROCEDURE — 90472 IMMUNIZATION ADMIN EACH ADD: CPT | Performed by: PHYSICIAN ASSISTANT

## 2023-02-09 PROCEDURE — 90746 HEPB VACCINE 3 DOSE ADULT IM: CPT | Performed by: PHYSICIAN ASSISTANT

## 2023-02-09 PROCEDURE — 80048 BASIC METABOLIC PNL TOTAL CA: CPT | Performed by: PHYSICIAN ASSISTANT

## 2023-02-09 PROCEDURE — 83036 HEMOGLOBIN GLYCOSYLATED A1C: CPT | Performed by: PHYSICIAN ASSISTANT

## 2023-02-09 PROCEDURE — 84443 ASSAY THYROID STIM HORMONE: CPT | Performed by: PHYSICIAN ASSISTANT

## 2023-02-09 PROCEDURE — 36415 COLL VENOUS BLD VENIPUNCTURE: CPT | Performed by: PHYSICIAN ASSISTANT

## 2023-02-09 PROCEDURE — 99396 PREV VISIT EST AGE 40-64: CPT | Mod: 25 | Performed by: PHYSICIAN ASSISTANT

## 2023-02-09 PROCEDURE — 99214 OFFICE O/P EST MOD 30 MIN: CPT | Mod: 25 | Performed by: PHYSICIAN ASSISTANT

## 2023-02-09 PROCEDURE — 90750 HZV VACC RECOMBINANT IM: CPT | Performed by: PHYSICIAN ASSISTANT

## 2023-02-09 PROCEDURE — 80061 LIPID PANEL: CPT | Performed by: PHYSICIAN ASSISTANT

## 2023-02-09 PROCEDURE — 85027 COMPLETE CBC AUTOMATED: CPT | Performed by: PHYSICIAN ASSISTANT

## 2023-02-09 RX ORDER — POLYETHYLENE GLYCOL 3350 17 G/17G
1 POWDER, FOR SOLUTION ORAL DAILY
Qty: 578 G | Refills: 4 | Status: SHIPPED | OUTPATIENT
Start: 2023-02-09 | End: 2024-03-20

## 2023-02-09 RX ORDER — LEVOTHYROXINE SODIUM 88 UG/1
88 TABLET ORAL DAILY
Qty: 90 TABLET | Refills: 3 | Status: SHIPPED | OUTPATIENT
Start: 2023-02-09 | End: 2023-08-31

## 2023-02-09 RX ORDER — ESTRADIOL 0.1 MG/G
CREAM VAGINAL
Qty: 42.5 G | Refills: 3 | Status: SHIPPED | OUTPATIENT
Start: 2023-02-09 | End: 2024-05-06

## 2023-02-09 ASSESSMENT — ENCOUNTER SYMPTOMS
HEADACHES: 0
DYSURIA: 0
MYALGIAS: 0
NERVOUS/ANXIOUS: 0
COUGH: 0
FEVER: 0
HEMATURIA: 0
EYE PAIN: 0
JOINT SWELLING: 0
PARESTHESIAS: 0
HEARTBURN: 0
ARTHRALGIAS: 0
SORE THROAT: 0
WEAKNESS: 0
HEMATOCHEZIA: 0
PALPITATIONS: 0
FREQUENCY: 0
NAUSEA: 0
BREAST MASS: 0
SHORTNESS OF BREATH: 1
DIZZINESS: 0
CONSTIPATION: 1
ABDOMINAL PAIN: 0
CHILLS: 0
DIARRHEA: 0

## 2023-02-09 NOTE — PROGRESS NOTES
SUBJECTIVE:   CC: Sophia is an 62 year old who presents for preventive health visit.       Healthy Habits:     Getting at least 3 servings of Calcium per day:  NO    Bi-annual eye exam:  NO    Dental care twice a year:  Yes    Sleep apnea or symptoms of sleep apnea:  Sleep apnea    Diet:  Regular (no restrictions)    Frequency of exercise:  2-3 days/week    Duration of exercise:  15-30 minutes    Taking medications regularly:  Yes    Medication side effects:  None    PHQ-2 Total Score: 0    Additional concerns today:  Yes    Sophia here today for M visit    Interested in management for obesity  Has heard about semaglutide    Due for med refills - estradiol cream, levothyroxine    Has chronic constipation, seems to be worse when she uses fiber    Today's PHQ-2 Score:   PHQ-2 (  [employer]) 2023   Q1: Little interest or pleasure in doing things 0   Q2: Feeling down, depressed or hopeless 0   PHQ-2 Score 0   PHQ-2 Total Score (12-17 Years)- Positive if 3 or more points; Administer PHQ-A if positive -   Q1: Little interest or pleasure in doing things Not at all   Q2: Feeling down, depressed or hopeless Not at all   PHQ-2 Score 0     Have you ever done Advance Care Planning? (For example, a Health Directive, POLST, or a discussion with a medical provider or your loved ones about your wishes):     Social History     Tobacco Use     Smoking status: Former     Packs/day: 0.00     Years: 3.00     Pack years: 0.00     Types: Cigarettes     Start date: 1975     Quit date: 1978     Years since quittin.1     Smokeless tobacco: Never     Tobacco comments:     I was a teenager quit at age 17   Substance Use Topics     Alcohol use: Yes     Comment: casual. occasional cocktail after work.  Some on a week-end       Alcohol Use 2023   Prescreen: >3 drinks/day or >7 drinks/week? No     Reviewed orders with patient.  Reviewed health maintenance and updated orders accordingly - Yes    Breast Cancer Screening:  Any  new diagnosis of family breast, ovarian, or bowel cancer? No    FHS-7:   Breast CA Risk Assessment (FHS-7) 6/29/2022 2/9/2023   Did any of your first-degree relatives have breast or ovarian cancer? No Unknown   Did any of your relatives have bilateral breast cancer? No No   Did any man in your family have breast cancer? No No   Did any woman in your family have breast and ovarian cancer? No Unknown   Did any woman in your family have breast cancer before age 50 y? No No   Do you have 2 or more relatives with breast and/or ovarian cancer? No No   Do you have 2 or more relatives with breast and/or bowel cancer? Yes Yes     Mammogram Screening: Recommended mammography every 1-2 years with patient discussion and risk factor consideration  Pertinent mammograms are reviewed under the imaging tab.    History of abnormal Pap smear: NO - age 30-65 PAP every 5 years with negative HPV co-testing recommended  PAP / HPV Latest Ref Rng & Units 7/12/2022 6/9/2015 5/22/2012   PAP   Negative for Intraepithelial Lesion or Malignancy (NILM) - -   PAP (Historical) - - NIL NIL   HPV16 Negative Negative - -   HPV18 Negative Negative - -   HRHPV Negative Negative - -     Reviewed and updated as needed this visit by clinical staff   Tobacco  Allergies  Meds  Problems  Med Hx  Surg Hx  Fam Hx          Reviewed and updated as needed this visit by Provider   Tobacco  Allergies  Meds  Problems  Med Hx  Surg Hx  Fam Hx           Review of Systems   Constitutional: Negative for chills and fever.   HENT: Positive for congestion. Negative for ear pain, hearing loss and sore throat.    Eyes: Positive for visual disturbance. Negative for pain.   Respiratory: Positive for shortness of breath. Negative for cough.    Cardiovascular: Negative for chest pain, palpitations and peripheral edema.   Gastrointestinal: Positive for constipation. Negative for abdominal pain, diarrhea, heartburn, hematochezia and nausea.   Breasts:  Negative for  "tenderness, breast mass and discharge.   Genitourinary: Negative for dysuria, frequency, genital sores, hematuria, pelvic pain, urgency, vaginal bleeding and vaginal discharge.   Musculoskeletal: Negative for arthralgias, joint swelling and myalgias.   Skin: Negative for rash.   Neurological: Negative for dizziness, weakness, headaches and paresthesias.   Psychiatric/Behavioral: Negative for mood changes. The patient is not nervous/anxious.         OBJECTIVE:   /87 (BP Location: Left arm, Patient Position: Sitting)   Temp 97.1  F (36.2  C) (Temporal)   Resp 14   Ht 1.621 m (5' 3.8\")   Wt 98 kg (216 lb)   LMP 06/17/2013   SpO2 96%   BMI 37.31 kg/m    Physical Exam  GENERAL: healthy, alert and no distress  EYES: Eyes grossly normal to inspection, PERRL and conjunctivae and sclerae normal  HENT: ear canals and TM's normal, nose and mouth without ulcers or lesions  NECK: no adenopathy, no asymmetry, masses, or scars and thyroid normal to palpation  RESP: lungs clear to auscultation - no rales, rhonchi or wheezes  CV: regular rate and rhythm, normal S1 S2, no S3 or S4, no murmur, click or rub, no peripheral edema and peripheral pulses strong  ABDOMEN: soft, nontender, no hepatosplenomegaly, no masses and bowel sounds normal  MS: no gross musculoskeletal defects noted, no edema  SKIN: no suspicious lesions or rashes  NEURO: Normal strength and tone, mentation intact and speech normal  PSYCH: mentation appears normal, affect normal/bright      ASSESSMENT/PLAN:   Sherrell was seen today for physical.    Diagnoses and all orders for this visit:    Routine adult health maintenance    Morbid obesity (H) - will start semaglutide for weight management, gradual increase over the next 3 months. Follow up in 3 months in person.  -     Semaglutide-Weight Management 0.25 MG/0.5ML SOAJ; Inject 0.25 mg Subcutaneous once a week for 28 days, THEN 0.5 mg once a week for 28 days, THEN 1 mg once a week for 28 days.  -     " "PRIMARY CARE FOLLOW-UP SCHEDULING; Future    Chronic constipation - discussed miralax PRN for management, follow up if still no improvement  -     polyethylene glycol (MIRALAX) 17 GM/Dose powder; Take 17 g (1 capful) by mouth daily    Vaginal atrophy - will restart therapy, has been off for about 3 months. Daily x2 weeks then decrease to twice weekly for maintenance.   -     estradiol (ESTRACE) 0.1 MG/GM vaginal cream; Insert 2 grams vaginally daily for two weeks then decrease to twice a week after that    Prediabetes  -     Basic metabolic panel; Future  -     Hemoglobin A1c; Future  -     Basic metabolic panel  -     Hemoglobin A1c    Screening for deficiency anemia  -     CBC with platelets; Future  -     CBC with platelets    Hypothyroidism due to acquired atrophy of thyroid  -     TSH; Future  -     TSH  -     Cancel: TSH    Mixed hyperlipidemia  -     Lipid panel reflex to direct LDL Non-fasting; Future  -     Lipid panel reflex to direct LDL Non-fasting    Encounter for immunization  -     ZOSTER VACCINE RECOMBINANT ADJUVANTED IM NJX      COUNSELING:  Reviewed preventive health counseling, as reflected in patient instructions      BMI:   Estimated body mass index is 37.31 kg/m  as calculated from the following:    Height as of this encounter: 1.621 m (5' 3.8\").    Weight as of this encounter: 98 kg (216 lb).       She reports that she quit smoking about 45 years ago. Her smoking use included cigarettes. She started smoking about 48 years ago. She has never used smokeless tobacco.          Kya Herring PA-C  [facility]  "

## 2023-02-11 DIAGNOSIS — E66.01 MORBID OBESITY (H): Primary | ICD-10-CM

## 2023-02-11 NOTE — TELEPHONE ENCOUNTER
This Rx for Semaglutide-Weight Management 0.25 MG/0.5ML SOAJ cannot be ordered.  Doesn't makes sense to inject multiple times per week and expensive.   Please put in a new Rx for an alternative.  Suggested alternative/s is/are: Semaglutide-Weight Management 0.5 MG/1ML SOAJ

## 2023-02-22 DIAGNOSIS — E66.01 MORBID OBESITY (H): Primary | ICD-10-CM

## 2023-02-22 NOTE — TELEPHONE ENCOUNTER
Kya-Please review, sign if agree and may close encounter.    Per active medication list review, patient's Semaglutide dose was working up to 1 mg per week.  Order pended.    Thank you!  RASHEED AlejandreN, RN-[facility]

## 2023-02-22 NOTE — TELEPHONE ENCOUNTER
Wegovy 1 mg pen      Last Written Prescription Date:  Not on current med list  Last Fill Quantity: 0,   # refills: 0  Last Office Visit: 2-9-23  Future Office visit:       Routing refill request to provider for review/approval because:  Drug not active on patient's medication list

## 2023-02-23 NOTE — TELEPHONE ENCOUNTER
Kya-Please review, sign if agree and may close encounter.    Writer called patient to clarify.    Patient stated:  1. Does want the 1 mg dose ordered  2. Pharmacist suggested she request this dose so she can reduce number of injections she has to give herself  3. Pharmacist adjusted current orders so that she would have fewer injections to give for increasing dose  4. Does not have the third month supply for dosing of this medication    Informed patient provider will be asked to send in orders for 1 mg dose    Thank you!  RASHEED AlejandreN, RN-[facility]

## 2023-02-24 DIAGNOSIS — E66.01 MORBID OBESITY (H): ICD-10-CM

## 2023-03-07 ENCOUNTER — MYC MEDICAL ADVICE (OUTPATIENT)
Dept: FAMILY MEDICINE | Facility: CLINIC | Age: 63
End: 2023-03-07
Payer: COMMERCIAL

## 2023-03-10 ENCOUNTER — MYC MEDICAL ADVICE (OUTPATIENT)
Dept: FAMILY MEDICINE | Facility: CLINIC | Age: 63
End: 2023-03-10
Payer: COMMERCIAL

## 2023-03-10 NOTE — TELEPHONE ENCOUNTER
Kya--    See pts messge    1. Feeling sick from wegovy    2. Pain in middle right side of her back    She is scheduled with you for 5/4/23.     Would you recommend pt is seen sooner? Do you want us to use a same day hold with you?    Lou Castillo, RASHEEDN RN  [facility]

## 2023-03-14 NOTE — TELEPHONE ENCOUNTER
See RN response to patient's mychart message re:wegovy  appt confirmed for 3/17    RASHEED SantacruzN RN  [facility]

## 2023-03-14 NOTE — TELEPHONE ENCOUNTER
wegovy slows gastric emptying so expectation is to feel sandoval longer  Sometimes this can cause nausea/upset stomach  Since she's tried it x4 weeks without any improvement, she will likely have worsened symptoms with increased dose    Why doesn't she come in sooner than the planned 3 month follow up to discuss other options for weight management as well as we can discuss her side pain    OK to use same day hold  Kya Herring PA-C

## 2023-03-17 ENCOUNTER — OFFICE VISIT (OUTPATIENT)
Dept: FAMILY MEDICINE | Facility: CLINIC | Age: 63
End: 2023-03-17
Payer: COMMERCIAL

## 2023-03-17 VITALS
RESPIRATION RATE: 22 BRPM | BODY MASS INDEX: 37.92 KG/M2 | DIASTOLIC BLOOD PRESSURE: 90 MMHG | SYSTOLIC BLOOD PRESSURE: 144 MMHG | OXYGEN SATURATION: 96 % | WEIGHT: 214 LBS | HEIGHT: 63 IN | TEMPERATURE: 99.6 F | HEART RATE: 92 BPM

## 2023-03-17 DIAGNOSIS — R10.11 RUQ ABDOMINAL PAIN: Primary | ICD-10-CM

## 2023-03-17 DIAGNOSIS — K81.9 CHOLECYSTITIS: ICD-10-CM

## 2023-03-17 DIAGNOSIS — R11.0 NAUSEA: ICD-10-CM

## 2023-03-17 DIAGNOSIS — D64.9 ANEMIA, UNSPECIFIED TYPE: ICD-10-CM

## 2023-03-17 LAB
ALBUMIN SERPL BCG-MCNC: 4.5 G/DL (ref 3.5–5.2)
ALP SERPL-CCNC: 60 U/L (ref 35–104)
ALT SERPL W P-5'-P-CCNC: 20 U/L (ref 10–35)
AST SERPL W P-5'-P-CCNC: 25 U/L (ref 10–35)
BILIRUB DIRECT SERPL-MCNC: <0.2 MG/DL (ref 0–0.3)
BILIRUB SERPL-MCNC: 0.3 MG/DL
ERYTHROCYTE [DISTWIDTH] IN BLOOD BY AUTOMATED COUNT: 15.5 % (ref 10–15)
FERRITIN SERPL-MCNC: 17 NG/ML (ref 11–328)
HCT VFR BLD AUTO: 37.6 % (ref 35–47)
HGB BLD-MCNC: 11.8 G/DL (ref 11.7–15.7)
IRON BINDING CAPACITY (ROCHE): 464 UG/DL (ref 240–430)
IRON SATN MFR SERPL: 9 % (ref 15–46)
IRON SERPL-MCNC: 43 UG/DL (ref 37–145)
MCH RBC QN AUTO: 24.8 PG (ref 26.5–33)
MCHC RBC AUTO-ENTMCNC: 31.4 G/DL (ref 31.5–36.5)
MCV RBC AUTO: 79 FL (ref 78–100)
PLATELET # BLD AUTO: 322 10E3/UL (ref 150–450)
PROT SERPL-MCNC: 7.8 G/DL (ref 6.4–8.3)
RBC # BLD AUTO: 4.75 10E6/UL (ref 3.8–5.2)
WBC # BLD AUTO: 9 10E3/UL (ref 4–11)

## 2023-03-17 PROCEDURE — 99214 OFFICE O/P EST MOD 30 MIN: CPT | Performed by: PHYSICIAN ASSISTANT

## 2023-03-17 PROCEDURE — 82728 ASSAY OF FERRITIN: CPT | Performed by: PHYSICIAN ASSISTANT

## 2023-03-17 PROCEDURE — 83540 ASSAY OF IRON: CPT | Performed by: PHYSICIAN ASSISTANT

## 2023-03-17 PROCEDURE — 85027 COMPLETE CBC AUTOMATED: CPT | Performed by: PHYSICIAN ASSISTANT

## 2023-03-17 PROCEDURE — 80076 HEPATIC FUNCTION PANEL: CPT | Performed by: PHYSICIAN ASSISTANT

## 2023-03-17 PROCEDURE — 36415 COLL VENOUS BLD VENIPUNCTURE: CPT | Performed by: PHYSICIAN ASSISTANT

## 2023-03-17 PROCEDURE — 83550 IRON BINDING TEST: CPT | Performed by: PHYSICIAN ASSISTANT

## 2023-03-17 RX ORDER — ONDANSETRON 8 MG/1
8 TABLET, ORALLY DISINTEGRATING ORAL EVERY 8 HOURS PRN
Qty: 20 TABLET | Refills: 0 | Status: SHIPPED | OUTPATIENT
Start: 2023-03-17 | End: 2024-03-20

## 2023-03-17 ASSESSMENT — PAIN SCALES - GENERAL: PAINLEVEL: MODERATE PAIN (5)

## 2023-03-17 ASSESSMENT — ENCOUNTER SYMPTOMS: BACK PAIN: 1

## 2023-03-17 NOTE — PROGRESS NOTES
"  Assessment & Plan     RUQ abdominal pain - broad differential. Will consider cholecystitis, liver etiology. Less suspicious of renal etiology. Will consider CT if US is not revealing given history of deep lipoma.   - REVIEW OF HEALTH MAINTENANCE PROTOCOL ORDERS  - Hepatic panel (Albumin, ALT, AST, Bili, Alk Phos, TP)  - US Abdomen Complete  - Hepatic panel (Albumin, ALT, AST, Bili, Alk Phos, TP)    Anemia, unspecified type - recheck today  - REVIEW OF HEALTH MAINTENANCE PROTOCOL ORDERS  - CBC with platelets  - Iron and iron binding capacity  - Ferritin  - CBC with platelets  - Iron and iron binding capacity  - Ferritin    Nausea - will provide zofran for symptom relief; however, resolved at this point. Will keep semaglutide follow up in May.  - ondansetron (ZOFRAN ODT) 8 MG ODT tab  Dispense: 20 tablet; Refill: 0     BMI:   Estimated body mass index is 38.15 kg/m  as calculated from the following:    Height as of this encounter: 1.595 m (5' 2.8\").    Weight as of this encounter: 97.1 kg (214 lb).     LIZETH Romero [facility]    Heidi Cortes is a 62 year old, presenting for the following health issues:  Back Pain (Med check as well, believe it may be an internal pain.)    Week before last had sinus infection, significant congestion  Vertigo, nausea, overall feeling unwell  Wasn't sleeping well with CPAP  Had thought this was due to new semaglutide  Did increase dose from 0.25mg to 0.5mg dose this week and is feeling normal - no nausea, feeling much better overall    Right flank pain ongoing since October  On and off in severity  Sometimes will get severe/stabbing - typically related to movement - feels like it will \"burst\" or \"rip\"  Thought it possibly got better when she was on high dose abx for dental infection  Otherwise no alleviating factors     Had left deep pelvic lipoma removed in her 30's  Has aunt who had a large (football sized) lipoma removed from abdomen      Back " "Pain     History of Present Illness       Reason for visit:  Primarily right inner back pain and low red blood counts  Symptom onset:  More than a month  Symptom intensity:  Moderate  Symptom progression:  Worsening  Had these symptoms before:  No  What makes it worse:  No  What makes it better:  No    She eats 0-1 servings of fruits and vegetables daily.She consumes 1 sweetened beverage(s) daily.She exercises with enough effort to increase her heart rate 9 or less minutes per day.  She exercises with enough effort to increase her heart rate 3 or less days per week.   She is taking medications regularly.         Objective    BP (!) 144/90 (BP Location: Right arm, Patient Position: Sitting, Cuff Size: Adult Large)   Pulse 92   Temp 99.6  F (37.6  C) (Temporal)   Resp 22   Ht 1.595 m (5' 2.8\")   Wt 97.1 kg (214 lb)   LMP 04/01/2013   SpO2 96%   BMI 38.15 kg/m    Body mass index is 38.15 kg/m .  Physical Exam   GENERAL: healthy, alert and no distress  PSYCH: mentation appears normal, affect normal/bright  "

## 2023-03-20 ENCOUNTER — MYC MEDICAL ADVICE (OUTPATIENT)
Dept: FAMILY MEDICINE | Facility: CLINIC | Age: 63
End: 2023-03-20
Payer: COMMERCIAL

## 2023-03-22 NOTE — TELEPHONE ENCOUNTER
Good questions - ferritin, hemoglobin and hematocrit are the most important iron markers, and those were all normal, which means you are not anemic.     I would actually recommend not taking an iron supplement OTC right now, and we should recheck your blood cells & iron tests again in May. If they are low again, then I'd like to do some investigation into what could be causing you to be intermittently low. If you were to take OTC supplements, this would likely mask something going on.    Kya Herring PA-C

## 2023-03-22 NOTE — TELEPHONE ENCOUNTER
Kya: pt has further concern about abnormal lab readings (iron sat index, MCH, MCHC)  ---  The three blood tests above all showed below average in the results (L).     Wouldnt this indicate that I'm still low in iron?  I started taking an over the counter iron mineral supplement and vitamin B6 to help (with vitamin C) for absorbtion.  Do you think this is okay?    ---  Karley QUEZADA RN  [facility]

## 2023-03-23 ENCOUNTER — MYC MEDICAL ADVICE (OUTPATIENT)
Dept: FAMILY MEDICINE | Facility: CLINIC | Age: 63
End: 2023-03-23

## 2023-03-23 ENCOUNTER — ANCILLARY PROCEDURE (OUTPATIENT)
Dept: ULTRASOUND IMAGING | Facility: CLINIC | Age: 63
End: 2023-03-23
Payer: COMMERCIAL

## 2023-03-23 ENCOUNTER — MYC MEDICAL ADVICE (OUTPATIENT)
Dept: FAMILY MEDICINE | Facility: CLINIC | Age: 63
End: 2023-03-23
Payer: COMMERCIAL

## 2023-03-23 DIAGNOSIS — E66.01 MORBID OBESITY (H): ICD-10-CM

## 2023-03-23 DIAGNOSIS — R10.11 RUQ ABDOMINAL PAIN: ICD-10-CM

## 2023-03-23 DIAGNOSIS — G47.33 OBSTRUCTIVE SLEEP APNEA: Primary | ICD-10-CM

## 2023-03-23 PROCEDURE — 76700 US EXAM ABDOM COMPLETE: CPT

## 2023-03-24 ENCOUNTER — OFFICE VISIT (OUTPATIENT)
Dept: SURGERY | Facility: CLINIC | Age: 63
End: 2023-03-24
Payer: COMMERCIAL

## 2023-03-24 VITALS
SYSTOLIC BLOOD PRESSURE: 133 MMHG | DIASTOLIC BLOOD PRESSURE: 88 MMHG | BODY MASS INDEX: 37.16 KG/M2 | OXYGEN SATURATION: 97 % | HEART RATE: 105 BPM | HEIGHT: 63 IN | WEIGHT: 209.7 LBS

## 2023-03-24 DIAGNOSIS — K81.9 CHOLECYSTITIS: ICD-10-CM

## 2023-03-24 PROCEDURE — 99203 OFFICE O/P NEW LOW 30 MIN: CPT | Performed by: SURGERY

## 2023-03-24 ASSESSMENT — ENCOUNTER SYMPTOMS
EYE WATERING: 0
EYE IRRITATION: 0
BRUISES/BLEEDS EASILY: 0
DOUBLE VISION: 0
TASTE DISTURBANCE: 0
HOARSE VOICE: 1
TROUBLE SWALLOWING: 1
SORE THROAT: 0
SINUS PAIN: 0
POOR WOUND HEALING: 0
SWOLLEN GLANDS: 0
SINUS CONGESTION: 1
NAIL CHANGES: 0
EYE REDNESS: 0
NECK MASS: 0
SKIN CHANGES: 0
SMELL DISTURBANCE: 0
EYE PAIN: 0

## 2023-03-24 ASSESSMENT — PAIN SCALES - GENERAL: PAINLEVEL: MODERATE PAIN (4)

## 2023-03-24 NOTE — PATIENT INSTRUCTIONS
You saw Dr Rojas and were offered gallbladder removal (robotic-assisted cholecystectomy) at a time of your convenience. To schedule surgery, please call Milan Lopez at [phone]. She will also help you schedule a pre-anesthesia clinic appointment for a History and Physical    -------    Prior to surgery, I reviewed the risks of gallbladder removal with this patient.    The risk discussion included, but was not limited to, death, myocardial infarction, pneumonia, urinary tract infection, deep venous thrombosis with or without pulmonary embolus, abdominal infection from bowel injury or abscess, bowel obstruction, wound infection, and bleeding.    Specific risks related to cholecystectomy include bile duct injury (3-4/1000), bile leak (10/1000), retained common bile duct stone (10/1000), postcholecystectomy diarrhea (1-2%) and these complications may require additional treatment.    The potential that gallbladder removal will NOT be of benefit was also reviewed.

## 2023-03-24 NOTE — LETTER
3/24/2023       RE: Sherrell Kothari  [address]  [place] [address]     Dear Colleague,    Thank you for referring your patient, Sherrell Kothari, to the [facility] [place] at [facility]. Please see a copy of my visit note below.    New General Surgery Consultation Note        Sherrell Kothari  [id number]  1960 March 24, 2023     Requesting Provider: Kya Herring     Dear Kya Regan,     I had the pleasure of seeing your patient, Sherrell Kothari is a 62 year old female who presents to clinic today for the following health issues       CHIEF COMPLAINT:  R back pain    Assessment & Plan   Problem List Items Addressed This Visit    None  Visit Diagnoses     Cholecystitis        Relevant Orders    PAC Visit Referral (For [facility] Only)    Case Request: CHOLECYSTECTOMY, ROBOT-ASSISTED, LAPAROSCOPIC, WITHOUT CHOLANGIOGRAM (Completed)       62F with ultrasund findings suggestive of chornic calculous cholecystitis vs simple cholelithiasis. While her symptoms are atypical for gallbladder pathology, I explained there is a reasonable but NOT certain chance of relief with cholecystectomy. After thorough risk/benefit discussion, she wishes to proceed with robot-assisted cholecystectomy. She will call Milan to schedule.    Pt on Gilbert for weight loss.    Jey Rojas MD      Lab Results   Component Value Date    WBC 9.0 03/17/2023    WBC 8.4 11/30/2020     Lab Results   Component Value Date    RBC 4.75 03/17/2023    RBC 4.83 11/30/2020     Lab Results   Component Value Date    HGB 11.8 03/17/2023    HGB 14.2 11/30/2020     Lab Results   Component Value Date    HCT 37.6 03/17/2023    HCT 44.2 11/30/2020     No components found for: MCT  Lab Results   Component Value Date    MCV 79 03/17/2023    MCV 92 11/30/2020     Lab Results   Component Value Date    MCH 24.8 03/17/2023    MCH 29.4 11/30/2020     Lab Results    Component Value Date    MCHC 31.4 03/17/2023    MCHC 32.1 11/30/2020     Lab Results   Component Value Date    RDW 15.5 03/17/2023    RDW 13.8 11/30/2020     Lab Results   Component Value Date     03/17/2023     11/30/2020     Last Comprehensive Metabolic Panel:  Lab Results   Component Value Date     02/09/2023    POTASSIUM 4.0 02/09/2023    CHLORIDE 106 02/09/2023    CO2 24 02/09/2023    ANIONGAP 11 02/09/2023     (H) 02/09/2023    BUN 16.4 02/09/2023    CR 0.92 02/09/2023    GFRESTIMATED 70 02/09/2023    ROCIO 9.2 02/09/2023         INR/Prothrombin Time  Liver Function Studies -   Recent Labs   Lab Test 03/17/23  1044   PROTTOTAL 7.8   ALBUMIN 4.5   BILITOTAL 0.3   ALKPHOS 60   AST 25   ALT 20         30 minutes spent on the date of the encounter doing chart review, history and exam, documentation and further activities per the note      HISTORY OF PRESENT ILLNESS:  62F presents with 6 mo worsening R back pain, feels gnawing, worse in the AM. Not a particular association to food. Reid shave occasional constipation. On workup, she was found to have cholelithiasis with gallbladder wall thickening.    ROS    PAST MEDICAL HISTORY:  Past Medical History:   Diagnosis Date     Allergic rhinitis, cause unspecified     Allergic rhinitis     Carcinoma in situ of skin of other and unspecified parts of face     squamous cell skin cancer upper lip     Depressive disorder unknown    anxiety non clinical     Depressive disorder, not elsewhere classified      Diaphragmatic hernia without mention of obstruction or gangrene     hiatal hernia     Essential hypertension, benign      Helicobacter pylori (H. pylori)     TREATED     Hypersomnia with sleep apnea, unspecified     CPAP     Mixed hyperlipidemia      Other chest pain 08/01/2004    non-cardiac, cardiology,  nl stress test     Other forms of migraine, without mention of intractable migraine without mention of status migrainosus     intermittent,  formerly treated with Zomig     Squamous cell carcinoma     Mohs surgery     Syncope ER 12/09    NL echo, head CT     Temporomandibular disorder      Unspecified hypothyroidism         PAST SURGICAL HISTORY:  Past Surgical History:   Procedure Laterality Date     BIOPSY OF SKIN LESION       BREAST SURGERY  2009 nishant    breast reduction     COLONOSCOPY  ongoing     ESOPHAGOSCOPY, GASTROSCOPY, DUODENOSCOPY (EGD), COMBINED  05/23/2011    Procedure:COMBINED ESOPHAGOSCOPY, GASTROSCOPY, DUODENOSCOPY (EGD), BIOPSY SINGLE OR MULTIPLE; Surgeon:JOSE L FOOTE; Location:[facility]     HC CT HEAD WO CONTRAST       HC ESOPHAGOSCOPY, DIAGNOSTIC      Hiatal Hernia     HC HYSTEROSCOPY, SURGICAL; W/ ENDOMETRIAL ABLATION, ANY METHOD N/A      HC REMOVAL OF TONSILS,<13 Y/O      Tonsils <11y.o.     HYSTEROSCOPY  05/01/2009    with endometrial ablation     MOHS MICROGRAPHIC PROCEDURE       PELVIS LAPAROSCOPY,DX  01/01/1996    Laparoscopy, benign fatty tumor LLQ     SURGICAL HISTORY OF -   11/01/2005    [facility] Blepharoplasty     SURGICAL HISTORY OF -   09/01/2006    [facility] Breast Reduction     ZZC ECHO HEART XTHORACIC,COMPLETE, W/O DOPPLER  12/01/2009    EF 60%        MEDICATIONS:  Current Outpatient Medications   Medication     estradiol (ESTRACE) 0.1 MG/GM vaginal cream     levothyroxine (SYNTHROID/LEVOTHROID) 88 MCG tablet     MAGNESIUM PO     ondansetron (ZOFRAN ODT) 8 MG ODT tab     order for DME     polyethylene glycol (MIRALAX) 17 GM/Dose powder     Semaglutide, 1 MG/DOSE, (OZEMPIC) 4 MG/3ML pen     Semaglutide-Weight Management 0.5 MG/0.5ML SOAJ     No current facility-administered medications for this visit.        ALLERGIES:  Allergies   Allergen Reactions     Latex Swelling and Rash     Issues with Latex Condoms in the past     Sulfa Drugs Rash     Rash, hives          SOCIAL HISTORY:  Social History     Socioeconomic History     Marital status:      Spouse name: Jaziel     Number of children: 0   Occupational History      "Occupation: Ad. Ass't     Employer: [employer]   Tobacco Use     Smoking status: Former     Packs/day: 0.00     Years: 3.00     Pack years: 0.00     Types: Cigarettes     Start date: 1975     Quit date: 1978     Years since quittin.2     Smokeless tobacco: Never     Tobacco comments:     I was a teenager quit at age 17   Vaping Use     Vaping Use: Never used   Substance and Sexual Activity     Alcohol use: Yes     Comment: casual. occasional cocktail after work.  Some on a week-end     Drug use: No     Sexual activity: Not Currently     Partners: Male     Comment:  Jaziel   Other Topics Concern     Parent/sibling w/ CABG, MI or angioplasty before 65F 55M? 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        Health Care Maintenance:    Last Pap: NIL    Vaccinations:up to date    TSH:2012 normal    Fasting glucose:elevated (104) in     Lipids:2012 normal    Mammogram:2012 Bi-rads one    Colonoscopy:? Unsure, believes she is due    Dexa:n/a                           FAMILY HISTORY:  Family History   Problem Relation Age of Onset     C.A.D. Mother         mild heart attack 59     Colon Polyps Mother      Mental Illness Mother         anxiety     Osteoporosis Mother      Diabetes Maternal Grandmother         type 2     Cancer - colorectal Maternal Grandmother      Colon Cancer Maternal Grandmother      Skin Cancer Maternal Grandmother      Alcohol/Drug Father      Blood Disease Maternal Grandfather          leukemia     Cancer - " "colorectal Brother 49             Myocardial Infarction Maternal Aunt 60     Myocardial Infarction Maternal Uncle 70     Colon Cancer Other         half brother     Coronary Artery Disease Other         maternal aunt triple bypass     Coronary Artery Disease Other         maternal uncle  of heart attack     Colon Cancer Maternal Half-Brother      Substance Abuse Sister         alcohol     Substance Abuse Brother         alcohol     Crohn's Disease No family hx of      Ulcerative Colitis No family hx of      Anesthesia Reaction No family hx of      Melanoma No family hx of      Thyroid Disease No family hx of         Ultrasound reviewed    PHYSICAL EXAM:  Objective     /88 (BP Location: Left arm, Patient Position: Sitting, Cuff Size: Adult Large)   Pulse 105   Ht 1.6 m (5' 3\")   Wt 95.1 kg (209 lb 11.2 oz)   LMP 2013   SpO2 97%   BMI 37.15 kg/m    /88 (BP Location: Left arm, Patient Position: Sitting, Cuff Size: Adult Large)   Pulse 105   Ht 1.6 m (5' 3\")   Wt 95.1 kg (209 lb 11.2 oz)   LMP 2013   SpO2 97%   BMI 37.15 kg/m    Body mass index is 37.15 kg/m .  Physical Exam        DISCUSSION OF RISKS:  Prior to surgery, I reviewed the risks of gallbladder removal with this patient.    The risk discussion included, but was not limited to, death, myocardial infarction, pneumonia, urinary tract infection, deep venous thrombosis with or without pulmonary embolus, abdominal infection from bowel injury or abscess, bowel obstruction, wound infection, and bleeding.    Specific risks related to cholecystectomy include bile duct injury (3-1000), bile leak (10/1000), retained common bile duct stone (10/1000), postcholecystectomy diarrhea (1-2%) and these complications may require additional treatment.    The potential that gallbladder removal will NOT be of benefit was also reviewed.    Furthermore, alternative therapies and the option for no therapy were also " reviewed.    Postoperative treatment and expected follow-up were also reviewed.          Again, thank you for allowing me to participate in the care of your patient.      Sincerely,    Jey Rojas MD

## 2023-03-24 NOTE — PROGRESS NOTES
New General Surgery Consultation Note        Sherrell Kothari  [id number]  1960 March 24, 2023     Requesting Provider: Kya Herring     Dear Kya Regan,     I had the pleasure of seeing your patient, Sherrell Kothari is a 62 year old female who presents to clinic today for the following health issues       CHIEF COMPLAINT:  R back pain    Assessment & Plan   Problem List Items Addressed This Visit    None  Visit Diagnoses     Cholecystitis        Relevant Orders    PAC Visit Referral (For [facility] Only)    Case Request: CHOLECYSTECTOMY, ROBOT-ASSISTED, LAPAROSCOPIC, WITHOUT CHOLANGIOGRAM (Completed)       62F with ultrasund findings suggestive of chornic calculous cholecystitis vs simple cholelithiasis. While her symptoms are atypical for gallbladder pathology, I explained there is a reasonable but NOT certain chance of relief with cholecystectomy. After thorough risk/benefit discussion, she wishes to proceed with robot-assisted cholecystectomy. She will call [place] to schedule.    Pt on Wegove[employer] for weight loss.    Jey Rojas MD      Lab Results   Component Value Date    WBC 9.0 03/17/2023    WBC 8.4 11/30/2020     Lab Results   Component Value Date    RBC 4.75 03/17/2023    RBC 4.83 11/30/2020     Lab Results   Component Value Date    HGB 11.8 03/17/2023    HGB 14.2 11/30/2020     Lab Results   Component Value Date    HCT 37.6 03/17/2023    HCT 44.2 11/30/2020     No components found for: MCT  Lab Results   Component Value Date    MCV 79 03/17/2023    MCV 92 11/30/2020     Lab Results   Component Value Date    MCH 24.8 03/17/2023    MCH 29.4 11/30/2020     Lab Results   Component Value Date    MCHC 31.4 03/17/2023    MCHC 32.1 11/30/2020     Lab Results   Component Value Date    RDW 15.5 03/17/2023    RDW 13.8 11/30/2020     Lab Results   Component Value Date     03/17/2023     11/30/2020     Last Comprehensive Metabolic Panel:  Lab Results   Component Value Date     02/09/2023     POTASSIUM 4.0 02/09/2023    CHLORIDE 106 02/09/2023    CO2 24 02/09/2023    ANIONGAP 11 02/09/2023     (H) 02/09/2023    BUN 16.4 02/09/2023    CR 0.92 02/09/2023    GFRESTIMATED 70 02/09/2023    ROCIO 9.2 02/09/2023         INR/Prothrombin Time  Liver Function Studies -   Recent Labs   Lab Test 03/17/23  1044   PROTTOTAL 7.8   ALBUMIN 4.5   BILITOTAL 0.3   ALKPHOS 60   AST 25   ALT 20         30 minutes spent on the date of the encounter doing chart review, history and exam, documentation and further activities per the note      HISTORY OF PRESENT ILLNESS:  62F presents with 6 mo worsening R back pain, feels gnawing, worse in the AM. Not a particular association to food. Reid shave occasional constipation. On workup, she was found to have cholelithiasis with gallbladder wall thickening.    ROS    PAST MEDICAL HISTORY:  Past Medical History:   Diagnosis Date     Allergic rhinitis, cause unspecified     Allergic rhinitis     Carcinoma in situ of skin of other and unspecified parts of face     squamous cell skin cancer upper lip     Depressive disorder unknown    anxiety non clinical     Depressive disorder, not elsewhere classified      Diaphragmatic hernia without mention of obstruction or gangrene     hiatal hernia     Essential hypertension, benign      Helicobacter pylori (H. pylori)     TREATED     Hypersomnia with sleep apnea, unspecified     CPAP     Mixed hyperlipidemia      Other chest pain 08/01/2004    non-cardiac, cardiology,  nl stress test     Other forms of migraine, without mention of intractable migraine without mention of status migrainosus     intermittent, formerly treated with Zomig     Squamous cell carcinoma     Mohs surgery     Syncope ER 12/09    NL echo, head CT     Temporomandibular disorder      Unspecified hypothyroidism         PAST SURGICAL HISTORY:  Past Surgical History:   Procedure Laterality Date     BIOPSY OF SKIN LESION       BREAST SURGERY  2009 nishant    breast reduction      COLONOSCOPY  ongoing     ESOPHAGOSCOPY, GASTROSCOPY, DUODENOSCOPY (EGD), COMBINED  2011    Procedure:COMBINED ESOPHAGOSCOPY, GASTROSCOPY, DUODENOSCOPY (EGD), BIOPSY SINGLE OR MULTIPLE; Surgeon:JOSE L FOOTE; Location:[facility]     [facility] CT HEAD WO CONTRAST       HC ESOPHAGOSCOPY, DIAGNOSTIC      Hiatal Hernia     HC HYSTEROSCOPY, SURGICAL; W/ ENDOMETRIAL ABLATION, ANY METHOD N/A      HC REMOVAL OF TONSILS,<13 Y/O      Tonsils <11y.o.     HYSTEROSCOPY  2009    with endometrial ablation     MOHS MICROGRAPHIC PROCEDURE       PELVIS LAPAROSCOPY,DX  1996    Laparoscopy, benign fatty tumor LLQ     SURGICAL HISTORY OF -   2005    [facility] Blepharoplasty     SURGICAL HISTORY OF -   2006    [facility] Breast Reduction     ZZC ECHO HEART XTHORACIC,COMPLETE, W/O DOPPLER  2009    EF 60%        MEDICATIONS:  Current Outpatient Medications   Medication     estradiol (ESTRACE) 0.1 MG/GM vaginal cream     levothyroxine (SYNTHROID/LEVOTHROID) 88 MCG tablet     MAGNESIUM PO     ondansetron (ZOFRAN ODT) 8 MG ODT tab     order for DME     polyethylene glycol (MIRALAX) 17 GM/Dose powder     Semaglutide, 1 MG/DOSE, (OZEMPIC) 4 MG/3ML pen     Semaglutide-Weight Management 0.5 MG/0.5ML SOAJ     No current facility-administered medications for this visit.        ALLERGIES:  Allergies   Allergen Reactions     Latex Swelling and Rash     Issues with Latex Condoms in the past     Sulfa Drugs Rash     Rash, hives          SOCIAL HISTORY:  Social History     Socioeconomic History     Marital status:      Spouse name: Jaziel     Number of children: 0   Occupational History     Occupation: Ad. Ass't     Employer: [employer]   Tobacco Use     Smoking status: Former     Packs/day: 0.00     Years: 3.00     Pack years: 0.00     Types: Cigarettes     Start date: 1975     Quit date: 1978     Years since quittin.2     Smokeless tobacco: Never     Tobacco comments:     I was a teenager quit  "at age 17   Vaping Use     Vaping Use: Never used   Substance and Sexual Activity     Alcohol use: Yes     Comment: casual. occasional cocktail after work.  Some on a week-end     Drug use: No     Sexual activity: Not Currently     Partners: Male     Comment:  Jaziel   Other Topics Concern     Parent/sibling w/ CABG, MI or angioplasty before 65F 55M? 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        Health Care Maintenance:    Last Pap: NIL    Vaccinations:up to date    TSH:2012 normal    Fasting glucose:elevated (104) in     Lipids:2012 normal    Mammogram:2012 Bi-rads one    Colonoscopy:? Unsure, believes she is due    Dexa:n/a                           FAMILY HISTORY:  Family History   Problem Relation Age of Onset     C.A.D. Mother         mild heart attack 59     Colon Polyps Mother      Mental Illness Mother         anxiety     Osteoporosis Mother      Diabetes Maternal Grandmother         type 2     Cancer - colorectal Maternal Grandmother      Colon Cancer Maternal Grandmother      Skin Cancer Maternal Grandmother      Alcohol/Drug Father      Blood Disease Maternal Grandfather          leukemia     Cancer - colorectal Brother 49             Myocardial Infarction Maternal Aunt 60     Myocardial Infarction Maternal Uncle 70     Colon Cancer Other         half brother     Coronary Artery Disease Other         maternal aunt triple bypass     Coronary Artery Disease Other         maternal uncle  of heart attack     Colon Cancer Maternal " "Half-Brother      Substance Abuse Sister         alcohol     Substance Abuse Brother         alcohol     Crohn's Disease No family hx of      Ulcerative Colitis No family hx of      Anesthesia Reaction No family hx of      Melanoma No family hx of      Thyroid Disease No family hx of         Ultrasound reviewed    PHYSICAL EXAM:  Objective    /88 (BP Location: Left arm, Patient Position: Sitting, Cuff Size: Adult Large)   Pulse 105   Ht 1.6 m (5' 3\")   Wt 95.1 kg (209 lb 11.2 oz)   LMP 04/01/2013   SpO2 97%   BMI 37.15 kg/m    /88 (BP Location: Left arm, Patient Position: Sitting, Cuff Size: Adult Large)   Pulse 105   Ht 1.6 m (5' 3\")   Wt 95.1 kg (209 lb 11.2 oz)   LMP 04/01/2013   SpO2 97%   BMI 37.15 kg/m    Body mass index is 37.15 kg/m .  Physical Exam        DISCUSSION OF RISKS:  Prior to surgery, I reviewed the risks of gallbladder removal with this patient.    The risk discussion included, but was not limited to, death, myocardial infarction, pneumonia, urinary tract infection, deep venous thrombosis with or without pulmonary embolus, abdominal infection from bowel injury or abscess, bowel obstruction, wound infection, and bleeding.    Specific risks related to cholecystectomy include bile duct injury (3-4/1000), bile leak (10/1000), retained common bile duct stone (10/1000), postcholecystectomy diarrhea (1-2%) and these complications may require additional treatment.    The potential that gallbladder removal will NOT be of benefit was also reviewed.    Furthermore, alternative therapies and the option for no therapy were also reviewed.    Postoperative treatment and expected follow-up were also reviewed.        Sincerely,     Jey Rojas MD    "

## 2023-03-24 NOTE — NURSING NOTE
"Chief Complaint   Patient presents with     New Patient     Cholecystitis       Vitals:    03/24/23 1427   BP: 133/88   BP Location: Left arm   Patient Position: Sitting   Cuff Size: Adult Large   Pulse: 105   SpO2: 97%   Weight: 95.1 kg (209 lb 11.2 oz)   Height: 1.6 m (5' 3\")       Body mass index is 37.15 kg/m .                          Ananda Prasad EMT    "

## 2023-03-27 ENCOUNTER — TELEPHONE (OUTPATIENT)
Dept: SURGERY | Facility: CLINIC | Age: 63
End: 2023-03-27
Payer: COMMERCIAL

## 2023-03-27 PROBLEM — K81.9 CHOLECYSTITIS: Status: ACTIVE | Noted: 2023-03-27

## 2023-03-27 NOTE — TELEPHONE ENCOUNTER
Called patient to discuss gallbladder surgery with Dr. Jey Rojas. Scheduled April 4 at 10:00 a.m. at the [facility], to arrive at 8:30 a.m.  PAC scheduled 03/31 at 7:45 a.m., 5th floor [facility].

## 2023-03-28 NOTE — TELEPHONE ENCOUNTER
FUTURE VISIT INFORMATION      SURGERY INFORMATION:    Date: 23    [facility]    Surgeon:  Jey Rojas MD    Anesthesia Type:  general    Procedure: CHOLECYSTECTOMY, ROBOT-ASSISTED, LAPAROSCOPIC, WITHOUT CHOLANGIOGRAM    Consult: ov 3/24/23    RECORDS REQUESTED FROM:       Primary Care Provider: [facility]    Pertinent Medical History: LORELEI, cherry angioma, hypertension    Most recent EKG+ Tracin/7/15    Most recent Cardiac Stress Test: 1/7/15    Most recent PFT's: 9/1/10

## 2023-03-31 ENCOUNTER — PRE VISIT (OUTPATIENT)
Dept: SURGERY | Facility: CLINIC | Age: 63
End: 2023-03-31

## 2023-03-31 ENCOUNTER — OFFICE VISIT (OUTPATIENT)
Dept: SURGERY | Facility: CLINIC | Age: 63
End: 2023-03-31
Payer: COMMERCIAL

## 2023-03-31 ENCOUNTER — TELEPHONE (OUTPATIENT)
Dept: SURGERY | Facility: CLINIC | Age: 63
End: 2023-03-31

## 2023-03-31 ENCOUNTER — ANESTHESIA EVENT (OUTPATIENT)
Dept: SURGERY | Facility: AMBULATORY SURGERY CENTER | Age: 63
End: 2023-03-31
Payer: COMMERCIAL

## 2023-03-31 VITALS
SYSTOLIC BLOOD PRESSURE: 126 MMHG | BODY MASS INDEX: 37.19 KG/M2 | HEART RATE: 91 BPM | DIASTOLIC BLOOD PRESSURE: 88 MMHG | OXYGEN SATURATION: 95 % | HEIGHT: 63 IN | RESPIRATION RATE: 16 BRPM | WEIGHT: 209.9 LBS

## 2023-03-31 DIAGNOSIS — Z01.818 PRE-OP EVALUATION: Primary | ICD-10-CM

## 2023-03-31 PROCEDURE — 99203 OFFICE O/P NEW LOW 30 MIN: CPT | Performed by: PHYSICIAN ASSISTANT

## 2023-03-31 RX ORDER — ZINC GLUCONATE 50 MG
50 TABLET ORAL PRN
COMMUNITY

## 2023-03-31 RX ORDER — IODINE SOLUTION STRONG 5% (LUGOL'S) 5 %
0.2 SOLUTION ORAL PRN
COMMUNITY

## 2023-03-31 ASSESSMENT — ENCOUNTER SYMPTOMS: SEIZURES: 0

## 2023-03-31 ASSESSMENT — PAIN SCALES - GENERAL: PAINLEVEL: MILD PAIN (3)

## 2023-03-31 ASSESSMENT — LIFESTYLE VARIABLES: TOBACCO_USE: 0

## 2023-03-31 NOTE — H&P (VIEW-ONLY)
Pre-Operative H & P     CC:  Preoperative exam to assess for increased cardiopulmonary risk while undergoing surgery and anesthesia.    Date of Encounter: 3/31/2023  Primary Care Physician:  Kya Herring     Reason for visit:   Encounter Diagnosis   Name Primary?     Pre-op evaluation Yes       HPI  Sherrell Kothari is a 62 year old female who presents for pre-operative H & P in preparation for  Procedure Information     Case: [id number] Date/Time: 04/04/23 1005    Procedure: CHOLECYSTECTOMY, ROBOT-ASSISTED, LAPAROSCOPIC, WITHOUT CHOLANGIOGRAM (Abdomen)    Anesthesia type: General    Diagnosis: Cholecystitis [K81.9]    Pre-op diagnosis: Cholecystitis [K81.9]    Location: [place] [address] / [facility]    Providers: Jey Rojas MD          Patient is being evaluated for comorbid conditions of hypertension, dyslipidemia, LORELEI, obesity, hypothyroid, depression, anxiety, migraines    Ms. Kothari was found ot have US finding suggestive of chronic calculous cholecystitis vs. Simple cholelithiasis. She was seen by Dr. Rojas for further evaluation. She is now scheduled for the above procedure.     History is obtained from the patient and chart review    Hx of abnormal bleeding or anti-platelet use: denies    Menstrual history: Patient's last menstrual period was 04/01/2013.     Past Medical History  Past Medical History:   Diagnosis Date     Allergic rhinitis, cause unspecified     Allergic rhinitis     Carcinoma in situ of skin of other and unspecified parts of face     squamous cell skin cancer upper lip     Depressive disorder unknown    anxiety non clinical     Depressive disorder, not elsewhere classified      Diaphragmatic hernia without mention of obstruction or gangrene     hiatal hernia     Essential hypertension, benign      Helicobacter pylori (H. pylori)     TREATED     Hypersomnia with sleep apnea, unspecified     CPAP     Mixed hyperlipidemia      Other chest  pain 08/01/2004    non-cardiac, cardiology,  nl stress test     Other forms of migraine, without mention of intractable migraine without mention of status migrainosus     intermittent, formerly treated with Zomig     Squamous cell carcinoma     Mohs surgery     Syncope ER 12/09    NL echo, head CT     Temporomandibular disorder      Unspecified hypothyroidism        Past Surgical History  Past Surgical History:   Procedure Laterality Date     BIOPSY OF SKIN LESION       BREAST SURGERY  2009 nishant    breast reduction     COLONOSCOPY  ongoing     ESOPHAGOSCOPY, GASTROSCOPY, DUODENOSCOPY (EGD), COMBINED  05/23/2011    Procedure:COMBINED ESOPHAGOSCOPY, GASTROSCOPY, DUODENOSCOPY (EGD), BIOPSY SINGLE OR MULTIPLE; Surgeon:JOSE L FOOTE; Location:[facility]     HC CT HEAD WO CONTRAST       HC ESOPHAGOSCOPY, DIAGNOSTIC      Hiatal Hernia     HC HYSTEROSCOPY, SURGICAL; W/ ENDOMETRIAL ABLATION, ANY METHOD N/A      HC REMOVAL OF TONSILS,<10 Y/O      Tonsils <11y.o.     HYSTEROSCOPY  05/01/2009    with endometrial ablation     MOHS MICROGRAPHIC PROCEDURE       PELVIS LAPAROSCOPY,DX  01/01/1996    Laparoscopy, benign fatty tumor LLQ     SURGICAL HISTORY OF -   11/01/2005    [facility] Blepharoplasty     SURGICAL HISTORY OF -   09/01/2006    [facility] Breast Reduction     ZZC ECHO HEART XTHORACIC,COMPLETE, W/O DOPPLER  12/01/2009    EF 60%       Prior to Admission Medications  Current Outpatient Medications   Medication Sig Dispense Refill     estradiol (ESTRACE) 0.1 MG/GM vaginal cream Insert 2 grams vaginally daily for two weeks then decrease to twice a week after that (Patient taking differently: twice a week) 42.5 g 3     levothyroxine (SYNTHROID/LEVOTHROID) 88 MCG tablet Take 1 tablet (88 mcg) by mouth daily (Patient taking differently: Take 88 mcg by mouth every morning) 90 tablet 3     MAGNESIUM PO Take 1,000 mg by mouth as needed       ondansetron (ZOFRAN ODT) 8 MG ODT tab Take 1 tablet (8 mg) by mouth every 8 hours as needed for nausea  20 tablet 0     selenium 50 MCG TABS tablet Take 50 mcg by mouth as needed       Semaglutide, 1 MG/DOSE, (OZEMPIC) 4 MG/3ML pen Inject 0.25 mg Subcutaneous every 7 days for 28 days, THEN 0.5 mg every 7 days for 28 days, THEN 1 mg every 7 days for 28 days. 6 mL 0     Semaglutide-Weight Management 0.5 MG/0.5ML SOAJ Inject 0.25 mg Subcutaneous every 7 days for 28 days, THEN 0.5 mg every 7 days for 28 days, THEN 1 mg every 7 days for 28 days. 7 mL 0     strong iodine (LUGOL'S) 5 % solution Take 0.2 mLs by mouth as needed       zinc gluconate 50 MG tablet Take 50 mg by mouth as needed       order for DME Equipment being ordered: CPAP equipment: tubing, head strap, nasal pillows (size small) [employer]en Respironics, and water reservoir 1 each 0     polyethylene glycol (MIRALAX) 17 GM/Dose powder Take 17 g (1 capful) by mouth daily (Patient not taking: Reported on 3/31/2023) 578 g 4       Allergies  Allergies   Allergen Reactions     Latex Swelling and Rash     Issues with Latex Condoms in the past     Sulfa Drugs Rash     Rash, hives         Social History  Social History     Socioeconomic History     Marital status:      Spouse name: Jaziel     Number of children: 0     Years of education: Not on file     Highest education level: Not on file   Occupational History     Occupation: Ad. Ass't     Employer: [employer]   Tobacco Use     Smoking status: Former     Packs/day: 0.00     Years: 3.00     Pack years: 0.00     Types: Cigarettes     Start date: 1975     Quit date: 1978     Years since quittin.2     Smokeless tobacco: Never     Tobacco comments:     I was a teenager quit at age 17   Vaping Use     Vaping Use: Never used   Substance and Sexual Activity     Alcohol use: Yes     Comment: casual. occasional cocktail after work.  Some on a week-end     Drug use: No     Sexual activity: Not Currently     Partners: Male     Comment:  Jaziel   Other Topics Concern      "Parent/sibling w/ CABG, MI or angioplasty before 65F 55M? 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        Health Care Maintenance:    Last Pap: NIL    Vaccinations:up to date    TSH:2012 normal    Fasting glucose:elevated (104) in     Lipids:2012 normal    Mammogram:2012 Bi-rads one    Colonoscopy:? Unsure, believes she is due    Dexa:n/a                         Social Determinants of Health     Financial Resource Strain: Not on file   Food Insecurity: Not on file   Transportation Needs: Not on file   Physical Activity: Not on file   Stress: Not on file   Social Connections: Not on file   Intimate Partner Violence: Not on file   Housing Stability: Not on file       Family History  Family History   Problem Relation Age of Onset     C.A.D. Mother         mild heart attack 59     Colon Polyps Mother      Mental Illness Mother         anxiety     Osteoporosis Mother      Alcohol/Drug Father      Substance Abuse Sister         alcohol     Cancer - colorectal Brother 49        2013     Substance Abuse Brother         alcohol     Diabetes Maternal Grandmother         type 2     Cancer - colorectal Maternal Grandmother      Colon Cancer Maternal Grandmother      Skin Cancer Maternal Grandmother      Blood Disease Maternal Grandfather          leukemia     Myocardial Infarction Maternal Aunt 60     Myocardial Infarction Maternal Uncle 70     Colon Cancer Maternal Half-Brother      Colon Cancer Other         half brother     Coronary " Artery Disease Other         maternal aunt triple bypass     Coronary Artery Disease Other         maternal uncle  of heart attack     Crohn's Disease No family hx of      Ulcerative Colitis No family hx of      Anesthesia Reaction No family hx of      Melanoma No family hx of      Thyroid Disease No family hx of      Deep Vein Thrombosis (DVT) No family hx of        Review of Systems  The complete review of systems is negative other than noted in the HPI or here.   Anesthesia Evaluation   Pt has had prior anesthetic.     History of anesthetic complications  - PONV.      ROS/MED HX  ENT/Pulmonary:     (+) sleep apnea, uses CPAP,  (-) tobacco use   Neurologic:  - neg neurologic ROS  (-) no seizures and no CVA   Cardiovascular:     (+) Dyslipidemia hypertension-----Previous cardiac testing   Echo: Date: Results:    Stress Test: Date:  Results:  Interpretation Summary  No stress induced regional wall motion abnormalities.  Normal resting LV function with EF of approximately 60-65%; normal response   to exercise with increase to approximately 70-75%.  No ECG evidence of ischemia.  No subjective evidence of ischemia.  Normal functional capacity.  No significant valvular disease noted on routine screening color flow Doppler   and pulsed Doppler examination.  ECG Reviewed: Date: Results:    Cath: Date: Results:   (-) taking anticoagulants/antiplatelets   METS/Exercise Tolerance: >4 METS Comment: Walking 45 minutes, housework   Hematologic:  - neg hematologic  ROS  (-) history of blood clots and history of blood transfusion   Musculoskeletal:  - neg musculoskeletal ROS     GI/Hepatic:     (+) cholecystitis/cholelithiasis,     Renal/Genitourinary:  - neg Renal ROS     Endo:     (+) thyroid problem, hypothyroidism, Obesity (BMI 37),     Psychiatric/Substance Use:     (+) psychiatric history anxiety and depression     Infectious Disease:  - neg infectious disease ROS     Malignancy:       Other:            /88  "(BP Location: Right arm, Patient Position: Sitting, Cuff Size: Adult Large)   Pulse 91   Resp 16   Ht 1.6 m (5' 3\")   Wt 95.2 kg (209 lb 14.4 oz)   LMP 04/01/2013   SpO2 95%   Breastfeeding No   BMI 37.18 kg/m      Physical Exam   Constitutional: Awake, alert, cooperative, no apparent distress, and appears stated age.  Eyes: Pupils equal, round and reactive to light, extra ocular muscles intact, sclera clear, conjunctiva normal.  HENT: Normocephalic, oral pharynx with moist mucus membranes, good dentition.   Respiratory: Clear to auscultation bilaterally, no crackles or wheezing.  Cardiovascular: Regular rate and rhythm, normal S1 and S2, and no murmur noted.  Carotids no bruits. No edema. Palpable pulses to radial  arteries.   GI: Normal bowel sounds, soft, non-distended, non-tender  Genitourinary:  deferred  Skin: Warm and dry.  No rashes at anticipated surgical site.   Musculoskeletal: Full ROM of neck. There is no redness, warmth, or swelling of the joints.   Neurologic: Awake, alert, oriented to name, place and time. Cranial nerves II-XII are grossly intact.   Neuropsychiatric: Calm, cooperative. Normal affect.     Prior Labs/Diagnostic Studies   All labs and imaging personally reviewed     EKG/ stress test - if available please see in ROS above   No results found.  No flowsheet data found.      The patient's records and results personally reviewed by this provider.     Outside records reviewed from: Care Everywhere    LAB/DIAGNOSTIC STUDIES TODAY:  none    Assessment      Sherrell Kothari is a 62 year old female seen as a PAC referral for risk assessment and optimization for anesthesia.    Plan/Recommendations  Pt will be optimized for the proposed procedure.  See below for details on the assessment, risk, and preoperative recommendations    NEUROLOGY  - No history of TIA, CVA or seizure  -Post Op delirium risk factors:  No risk identified    ENT  - No current airway concerns.  Will need to be " "reassessed day of surgery.  Mallampati: III  TM: > 3    CARDIAC  - No history of CAD and Afib   -h/o hypertension.   No current medications and BP normal. She reports this imp[roved when she retired and thinks BP was stress related.   -denies cardiac history or symptoms   - METS (Metabolic Equivalents)  Patient performs 4 or more METS exercise without symptoms            Total Score: 0      RCRI-Very low risk: Class 1 0.4% complication rate            Total Score: 0        PULMONARY  - Obstructive Sleep Apnea  LORELEI with home CPAP.  Patient will be instructed to bring their home CPAP device to the hospital with them.  - Denies asthma or inhaler use  - Tobacco History      History   Smoking Status     Former     Packs/day: 0.00     Years: 3.00     Types: Cigarettes     Start date: 1/1/1975     Quit date: 1/1/1978   Smokeless Tobacco     Never       GI  -cholecystitis vs. Cholelithiasis with the above procedure planned   PONV High Risk  Total Score: 3           1 AN PONV: Pt is Female    1 AN PONV: Patient is not a current smoker    1 AN PONV: Intended Post Op Opioids        /RENAL  - Baseline Creatinine WNL    ENDOCRINE    - BMI: Estimated body mass index is 37.18 kg/m  as calculated from the following:    Height as of this encounter: 1.6 m (5' 3\").    Weight as of this encounter: 95.2 kg (209 lb 14.4 oz).  Obesity (BMI >30)   -using Semiglutide for weight management  - Thyroid disorder  Continue home replacement while hospitalized.    HEME  VTE Low Risk 0.26%            Total Score: 1    VTE: Greater than 59 yrs old      - No history of abnormal bleeding or antiplatelet use.    ID  -patient tested positive for COVID 3/27/23. She reports she had symptoms of sneezing/ runny nose starting 3/26 and then found out she had an exposure and tested 3/27.  She denies fevers, coughing, SOB. She states symptoms resolved by 3/29.  Surgery is scheduled 4/4.  I will alert surgeon to recent COVID to discuss surgery timing. "     Different anesthesia methods/types have been discussed with the patient, but they are aware that the final plan will be decided by the assigned anesthesia provider on the date of service.    The patient is optimized for their procedure, pending discussion on timing with surgery due to her recent COVID. AVS with information on surgery time/arrival time, meds and NPO status given by nursing staff. No further diagnostic testing indicated.      On the day of service:     Prep time: 3 minutes  Visit time: 20 minutes  Documentation time: 12 minutes  ------------------------------------------  Total time: 35 minutes      Nalini Love PA-C  Preoperative Assessment Center  [facility]  [facility] Center  Phone: [phone]  Fax: [phone]

## 2023-03-31 NOTE — H&P
Pre-Operative H & P     CC:  Preoperative exam to assess for increased cardiopulmonary risk while undergoing surgery and anesthesia.    Date of Encounter: 3/31/2023  Primary Care Physician:  Kya Herring     Reason for visit:   Encounter Diagnosis   Name Primary?     Pre-op evaluation Yes       HPI  Sherrell Kothari is a 62 year old female who presents for pre-operative H & P in preparation for  Procedure Information     Case: [id number] Date/Time: 04/04/23 1005    Procedure: CHOLECYSTECTOMY, ROBOT-ASSISTED, LAPAROSCOPIC, WITHOUT CHOLANGIOGRAM (Abdomen)    Anesthesia type: General    Diagnosis: Cholecystitis [K81.9]    Pre-op diagnosis: Cholecystitis [K81.9]    Location: [place] [address] / [facility]    Providers: Jey Rojas MD          Patient is being evaluated for comorbid conditions of hypertension, dyslipidemia, LORELEI, obesity, hypothyroid, depression, anxiety, migraines    Ms. Kothari was found ot have US finding suggestive of chronic calculous cholecystitis vs. Simple cholelithiasis. She was seen by Dr. Rojas for further evaluation. She is now scheduled for the above procedure.     History is obtained from the patient and chart review    Hx of abnormal bleeding or anti-platelet use: denies    Menstrual history: Patient's last menstrual period was 04/01/2013.     Past Medical History  Past Medical History:   Diagnosis Date     Allergic rhinitis, cause unspecified     Allergic rhinitis     Carcinoma in situ of skin of other and unspecified parts of face     squamous cell skin cancer upper lip     Depressive disorder unknown    anxiety non clinical     Depressive disorder, not elsewhere classified      Diaphragmatic hernia without mention of obstruction or gangrene     hiatal hernia     Essential hypertension, benign      Helicobacter pylori (H. pylori)     TREATED     Hypersomnia with sleep apnea, unspecified     CPAP     Mixed hyperlipidemia      Other chest  pain 08/01/2004    non-cardiac, cardiology,  nl stress test     Other forms of migraine, without mention of intractable migraine without mention of status migrainosus     intermittent, formerly treated with Zomig     Squamous cell carcinoma     Mohs surgery     Syncope ER 12/09    NL echo, head CT     Temporomandibular disorder      Unspecified hypothyroidism        Past Surgical History  Past Surgical History:   Procedure Laterality Date     BIOPSY OF SKIN LESION       BREAST SURGERY  2009 nishant    breast reduction     COLONOSCOPY  ongoing     ESOPHAGOSCOPY, GASTROSCOPY, DUODENOSCOPY (EGD), COMBINED  05/23/2011    Procedure:COMBINED ESOPHAGOSCOPY, GASTROSCOPY, DUODENOSCOPY (EGD), BIOPSY SINGLE OR MULTIPLE; Surgeon:JOSE L FOOTE; Location:[facility]     HC CT HEAD WO CONTRAST       HC ESOPHAGOSCOPY, DIAGNOSTIC      Hiatal Hernia     HC HYSTEROSCOPY, SURGICAL; W/ ENDOMETRIAL ABLATION, ANY METHOD N/A      HC REMOVAL OF TONSILS,<10 Y/O      Tonsils <11y.o.     HYSTEROSCOPY  05/01/2009    with endometrial ablation     MOHS MICROGRAPHIC PROCEDURE       PELVIS LAPAROSCOPY,DX  01/01/1996    Laparoscopy, benign fatty tumor LLQ     SURGICAL HISTORY OF -   11/01/2005    [facility] Blepharoplasty     SURGICAL HISTORY OF -   09/01/2006    [facility] Breast Reduction     ZZC ECHO HEART XTHORACIC,COMPLETE, W/O DOPPLER  12/01/2009    EF 60%       Prior to Admission Medications  Current Outpatient Medications   Medication Sig Dispense Refill     estradiol (ESTRACE) 0.1 MG/GM vaginal cream Insert 2 grams vaginally daily for two weeks then decrease to twice a week after that (Patient taking differently: twice a week) 42.5 g 3     levothyroxine (SYNTHROID/LEVOTHROID) 88 MCG tablet Take 1 tablet (88 mcg) by mouth daily (Patient taking differently: Take 88 mcg by mouth every morning) 90 tablet 3     MAGNESIUM PO Take 1,000 mg by mouth as needed       ondansetron (ZOFRAN ODT) 8 MG ODT tab Take 1 tablet (8 mg) by mouth every 8 hours as needed for nausea  20 tablet 0     selenium 50 MCG TABS tablet Take 50 mcg by mouth as needed       Semaglutide, 1 MG/DOSE, (OZEMPIC) 4 MG/3ML pen Inject 0.25 mg Subcutaneous every 7 days for 28 days, THEN 0.5 mg every 7 days for 28 days, THEN 1 mg every 7 days for 28 days. 6 mL 0     Semaglutide-Weight Management 0.5 MG/0.5ML SOAJ Inject 0.25 mg Subcutaneous every 7 days for 28 days, THEN 0.5 mg every 7 days for 28 days, THEN 1 mg every 7 days for 28 days. 7 mL 0     strong iodine (LUGOL'S) 5 % solution Take 0.2 mLs by mouth as needed       zinc gluconate 50 MG tablet Take 50 mg by mouth as needed       order for DME Equipment being ordered: CPAP equipment: tubing, head strap, nasal pillows (size small) Madsen Respironics, and water reservoir 1 each 0     polyethylene glycol (MIRALAX) 17 GM/Dose powder Take 17 g (1 capful) by mouth daily (Patient not taking: Reported on 3/31/2023) 578 g 4       Allergies  Allergies   Allergen Reactions     Latex Swelling and Rash     Issues with Latex Condoms in the past     Sulfa Drugs Rash     Rash, hives         Social History  Social History     Socioeconomic History     Marital status:      Spouse name: Jaziel     Number of children: 0     Years of education: Not on file     Highest education level: Not on file   Occupational History     Occupation: Ad. Ass't     Employer: [employer]   Tobacco Use     Smoking status: Former     Packs/day: 0.00     Years: 3.00     Pack years: 0.00     Types: Cigarettes     Start date: 1975     Quit date: 1978     Years since quittin.2     Smokeless tobacco: Never     Tobacco comments:     I was a teenager quit at age 17   Vaping Use     Vaping Use: Never used   Substance and Sexual Activity     Alcohol use: Yes     Comment: casual. occasional cocktail after work.  Some on a week-end     Drug use: No     Sexual activity: Not Currently     Partners: Male     Comment:  Jaziel   Other Topics Concern      "Parent/sibling w/ CABG, MI or angioplasty before 65F 55M? 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        Health Care Maintenance:    Last Pap: NIL    Vaccinations:up to date    TSH:2012 normal    Fasting glucose:elevated (104) in     Lipids:2012 normal    Mammogram:2012 Bi-rads one    Colonoscopy:? Unsure, believes she is due    Dexa:n/a                         Social Determinants of Health     Financial Resource Strain: Not on file   Food Insecurity: Not on file   Transportation Needs: Not on file   Physical Activity: Not on file   Stress: Not on file   Social Connections: Not on file   Intimate Partner Violence: Not on file   Housing Stability: Not on file       Family History  Family History   Problem Relation Age of Onset     C.A.D. Mother         mild heart attack 59     Colon Polyps Mother      Mental Illness Mother         anxiety     Osteoporosis Mother      Alcohol/Drug Father      Substance Abuse Sister         alcohol     Cancer - colorectal Brother 49        2013     Substance Abuse Brother         alcohol     Diabetes Maternal Grandmother         type 2     Cancer - colorectal Maternal Grandmother      Colon Cancer Maternal Grandmother      Skin Cancer Maternal Grandmother      Blood Disease Maternal Grandfather          leukemia     Myocardial Infarction Maternal Aunt 60     Myocardial Infarction Maternal Uncle 70     Colon Cancer Maternal Half-Brother      Colon Cancer Other         half brother     Coronary " Artery Disease Other         maternal aunt triple bypass     Coronary Artery Disease Other         maternal uncle  of heart attack     Crohn's Disease No family hx of      Ulcerative Colitis No family hx of      Anesthesia Reaction No family hx of      Melanoma No family hx of      Thyroid Disease No family hx of      Deep Vein Thrombosis (DVT) No family hx of        Review of Systems  The complete review of systems is negative other than noted in the HPI or here.   Anesthesia Evaluation   Pt has had prior anesthetic.     History of anesthetic complications  - PONV.      ROS/MED HX  ENT/Pulmonary:     (+) sleep apnea, uses CPAP,  (-) tobacco use   Neurologic:  - neg neurologic ROS  (-) no seizures and no CVA   Cardiovascular:     (+) Dyslipidemia hypertension-----Previous cardiac testing   Echo: Date: Results:    Stress Test: Date:  Results:  Interpretation Summary  No stress induced regional wall motion abnormalities.  Normal resting LV function with EF of approximately 60-65%; normal response   to exercise with increase to approximately 70-75%.  No ECG evidence of ischemia.  No subjective evidence of ischemia.  Normal functional capacity.  No significant valvular disease noted on routine screening color flow Doppler   and pulsed Doppler examination.  ECG Reviewed: Date: Results:    Cath: Date: Results:   (-) taking anticoagulants/antiplatelets   METS/Exercise Tolerance: >4 METS Comment: Walking 45 minutes, housework   Hematologic:  - neg hematologic  ROS  (-) history of blood clots and history of blood transfusion   Musculoskeletal:  - neg musculoskeletal ROS     GI/Hepatic:     (+) cholecystitis/cholelithiasis,     Renal/Genitourinary:  - neg Renal ROS     Endo:     (+) thyroid problem, hypothyroidism, Obesity (BMI 37),     Psychiatric/Substance Use:     (+) psychiatric history anxiety and depression     Infectious Disease:  - neg infectious disease ROS     Malignancy:       Other:            /88  "(BP Location: Right arm, Patient Position: Sitting, Cuff Size: Adult Large)   Pulse 91   Resp 16   Ht 1.6 m (5' 3\")   Wt 95.2 kg (209 lb 14.4 oz)   LMP 04/01/2013   SpO2 95%   Breastfeeding No   BMI 37.18 kg/m      Physical Exam   Constitutional: Awake, alert, cooperative, no apparent distress, and appears stated age.  Eyes: Pupils equal, round and reactive to light, extra ocular muscles intact, sclera clear, conjunctiva normal.  HENT: Normocephalic, oral pharynx with moist mucus membranes, good dentition.   Respiratory: Clear to auscultation bilaterally, no crackles or wheezing.  Cardiovascular: Regular rate and rhythm, normal S1 and S2, and no murmur noted.  Carotids no bruits. No edema. Palpable pulses to radial  arteries.   GI: Normal bowel sounds, soft, non-distended, non-tender  Genitourinary:  deferred  Skin: Warm and dry.  No rashes at anticipated surgical site.   Musculoskeletal: Full ROM of neck. There is no redness, warmth, or swelling of the joints.   Neurologic: Awake, alert, oriented to name, place and time. Cranial nerves II-XII are grossly intact.   Neuropsychiatric: Calm, cooperative. Normal affect.     Prior Labs/Diagnostic Studies   All labs and imaging personally reviewed     EKG/ stress test - if available please see in ROS above   No results found.  No flowsheet data found.      The patient's records and results personally reviewed by this provider.     Outside records reviewed from: Care Everywhere    LAB/DIAGNOSTIC STUDIES TODAY:  none    Assessment      Sherrell Kothari is a 62 year old female seen as a PAC referral for risk assessment and optimization for anesthesia.    Plan/Recommendations  Pt will be optimized for the proposed procedure.  See below for details on the assessment, risk, and preoperative recommendations    NEUROLOGY  - No history of TIA, CVA or seizure  -Post Op delirium risk factors:  No risk identified    ENT  - No current airway concerns.  Will need to be " "reassessed day of surgery.  Mallampati: III  TM: > 3    CARDIAC  - No history of CAD and Afib   -h/o hypertension.   No current medications and BP normal. She reports this imp[roved when she retired and thinks BP was stress related.   -denies cardiac history or symptoms   - METS (Metabolic Equivalents)  Patient performs 4 or more METS exercise without symptoms            Total Score: 0      RCRI-Very low risk: Class 1 0.4% complication rate            Total Score: 0        PULMONARY  - Obstructive Sleep Apnea  LORELEI with home CPAP.  Patient will be instructed to bring their home CPAP device to the hospital with them.  - Denies asthma or inhaler use  - Tobacco History      History   Smoking Status     Former     Packs/day: 0.00     Years: 3.00     Types: Cigarettes     Start date: 1/1/1975     Quit date: 1/1/1978   Smokeless Tobacco     Never       GI  -cholecystitis vs. Cholelithiasis with the above procedure planned   PONV High Risk  Total Score: 3           1 AN PONV: Pt is Female    1 AN PONV: Patient is not a current smoker    1 AN PONV: Intended Post Op Opioids        /RENAL  - Baseline Creatinine WNL    ENDOCRINE    - BMI: Estimated body mass index is 37.18 kg/m  as calculated from the following:    Height as of this encounter: 1.6 m (5' 3\").    Weight as of this encounter: 95.2 kg (209 lb 14.4 oz).  Obesity (BMI >30)   -using Semiglutide for weight management  - Thyroid disorder  Continue home replacement while hospitalized.    HEME  VTE Low Risk 0.26%            Total Score: 1    VTE: Greater than 59 yrs old      - No history of abnormal bleeding or antiplatelet use.    ID  -patient tested positive for COVID 3/27/23. She reports she had symptoms of sneezing/ runny nose starting 3/26 and then found out she had an exposure and tested 3/27.  She denies fevers, coughing, SOB. She states symptoms resolved by 3/29.  Surgery is scheduled 4/4.  I will alert surgeon to recent COVID to discuss surgery timing. "     Different anesthesia methods/types have been discussed with the patient, but they are aware that the final plan will be decided by the assigned anesthesia provider on the date of service.    The patient is optimized for their procedure, pending discussion on timing with surgery due to her recent COVID. AVS with information on surgery time/arrival time, meds and NPO status given by nursing staff. No further diagnostic testing indicated.      On the day of service:     Prep time: 3 minutes  Visit time: 20 minutes  Documentation time: 12 minutes  ------------------------------------------  Total time: 35 minutes      Nalini Love PA-C  Preoperative Assessment Center  [facility]  Clinic and Surgery Center  Phone: [phone]  Fax: [phone]

## 2023-03-31 NOTE — TELEPHONE ENCOUNTER
Patient had her pre-op H&P this morning with PAC. She tested positive for COVID on  Monday. Her lungs were clear today and Dr. Rojas felt she could stay on the calendar for a robotic-assisted laparoscopic cholecystectomy next Tuesday.  Called patient to let her know.

## 2023-03-31 NOTE — PATIENT INSTRUCTIONS
Preparing for Your Surgery      Name:  Sherrell Kothari   MRN:  [id number]   :  1960   Today's Date:  3/31/2023         Arriving for surgery:  Surgery date:  23  Arrival time:  8.35AM    Restrictions due to COVID 19:    Effective 2022:  2 visitors may accompany patient and wait in the Surgery Waiting Room.  All visitors must wear a mask and social distance.      parking is available for anyone with mobility limitations or disabilities. (Monday- Friday 7 am- 5 pm)    Please come to:    [facility]  [address] [place] [id number]    Please check in on the 5th floor at the Ambulatory Surgery Center.      What can I eat or drink?    -  You may eat and drink normally until 8 hours prior to arrival  time. (Until 12.35AM)  -  You may have clear liquids until 2 hours prior to arrival  time. (Until 6.35AM)    Examples of clear liquids:  Water  Clear broth  Juices (apple, white grape, white cranberry  and cider) without pulp  Noncarbonated, powder based beverages  (lemonade and Robe-Aid)  Sodas (Sprite, 7-Up, ginger ale and seltzer)  Coffee or tea (without milk or cream)  Gatorade    --No alcohol for at least 24 hours before surgery.    Which medicines can I take?    Hold Aspirin for 7 days before surgery.   Hold Multivitamins for 7 days before surgery.   Hold Supplements for 7 days before surgery. (Magnesium, Selenium, Zinc Gluconate)  Hold Ibuprofen (Advil, Motrin) for 1 day before surgery--unless otherwise directed by surgeon.  Hold Naproxen (Aleve) for 4 days before surgery.      -  DO NOT take the following medications the day of surgery:  Miralax, Ozempiq (if your injection falls on the day of surgery)    -  PLEASE TAKE the following medications the day of surgery:   Levothyroxine, Zofran (as needed)    How do I prepare myself?  - Please take 2 showers (one the night prior to surgery and one the morning of surgery) using Scrubcare or Hibiclens soap.    Use  this soap only from the neck to your toes.     Leave the soap on your skin for one minute--then rinse thoroughly.      You may use your own shampoo and conditioner. No other hair products.   - Please remove all jewelry and body piercings.  - No lotions, deodorants or fragrance.  - No makeup or fingernail polish.   - Bring your ID and insurance card.    -If you have a Deep Brain Stimulator, a Spinal Cord Stimulator, or any implanted Neuro Device, you must bring the remote to the Surgery Center.         ALL PATIENTS ARE REQUIRED TO HAVE A RESPONSIBLE ADULT TO DRIVE AND BE IN ATTENDANCE WITH THEM FOR 24 HOURS FOLLOWING SURGERY.     Covid testing policy as of 12/06/2022  Your surgeon will notify and schedule you for a COVID test if one is needed before surgery--please direct any questions or COVID symptoms to your surgeon      Questions or Concerns:    -For questions regarding the day of surgery, please contact the Ambulatory Surgery Center at [phone].    -If you have health changes between today and your surgery, please contact your surgeon.     - For questions after surgery, please contact your surgeon's office.

## 2023-04-04 ENCOUNTER — HOSPITAL ENCOUNTER (OUTPATIENT)
Facility: AMBULATORY SURGERY CENTER | Age: 63
Discharge: HOME | End: 2023-04-04
Attending: SURGERY
Payer: COMMERCIAL

## 2023-04-04 ENCOUNTER — ANESTHESIA (OUTPATIENT)
Dept: SURGERY | Facility: AMBULATORY SURGERY CENTER | Age: 63
End: 2023-04-04
Payer: COMMERCIAL

## 2023-04-04 VITALS
TEMPERATURE: 97.3 F | HEART RATE: 76 BPM | SYSTOLIC BLOOD PRESSURE: 122 MMHG | RESPIRATION RATE: 16 BRPM | BODY MASS INDEX: 37.19 KG/M2 | OXYGEN SATURATION: 98 % | HEIGHT: 63 IN | WEIGHT: 209.9 LBS | DIASTOLIC BLOOD PRESSURE: 78 MMHG

## 2023-04-04 DIAGNOSIS — K81.9 CHOLECYSTITIS: ICD-10-CM

## 2023-04-04 PROCEDURE — 88304 TISSUE EXAM BY PATHOLOGIST: CPT | Mod: 26 | Performed by: PATHOLOGY

## 2023-04-04 PROCEDURE — 88304 TISSUE EXAM BY PATHOLOGIST: CPT | Mod: TC | Performed by: SURGERY

## 2023-04-04 PROCEDURE — 47562 LAPAROSCOPIC CHOLECYSTECTOMY: CPT | Performed by: SURGERY

## 2023-04-04 PROCEDURE — 47562 LAPAROSCOPIC CHOLECYSTECTOMY: CPT

## 2023-04-04 RX ORDER — ONDANSETRON 4 MG/1
4 TABLET, ORALLY DISINTEGRATING ORAL EVERY 30 MIN PRN
Status: DISCONTINUED | OUTPATIENT
Start: 2023-04-04 | End: 2023-04-04 | Stop reason: HOSPADM

## 2023-04-04 RX ORDER — DEXAMETHASONE SODIUM PHOSPHATE 4 MG/ML
INJECTION, SOLUTION INTRA-ARTICULAR; INTRALESIONAL; INTRAMUSCULAR; INTRAVENOUS; SOFT TISSUE PRN
Status: DISCONTINUED | OUTPATIENT
Start: 2023-04-04 | End: 2023-04-04

## 2023-04-04 RX ORDER — FENTANYL CITRATE 50 UG/ML
INJECTION, SOLUTION INTRAMUSCULAR; INTRAVENOUS PRN
Status: DISCONTINUED | OUTPATIENT
Start: 2023-04-04 | End: 2023-04-04

## 2023-04-04 RX ORDER — GLYCOPYRROLATE 0.2 MG/ML
INJECTION, SOLUTION INTRAMUSCULAR; INTRAVENOUS PRN
Status: DISCONTINUED | OUTPATIENT
Start: 2023-04-04 | End: 2023-04-04

## 2023-04-04 RX ORDER — HYDROMORPHONE HYDROCHLORIDE 1 MG/ML
0.4 INJECTION, SOLUTION INTRAMUSCULAR; INTRAVENOUS; SUBCUTANEOUS EVERY 5 MIN PRN
Status: DISCONTINUED | OUTPATIENT
Start: 2023-04-04 | End: 2023-04-04 | Stop reason: HOSPADM

## 2023-04-04 RX ORDER — HYDROMORPHONE HYDROCHLORIDE 1 MG/ML
0.2 INJECTION, SOLUTION INTRAMUSCULAR; INTRAVENOUS; SUBCUTANEOUS EVERY 5 MIN PRN
Status: DISCONTINUED | OUTPATIENT
Start: 2023-04-04 | End: 2023-04-04 | Stop reason: HOSPADM

## 2023-04-04 RX ORDER — CEFAZOLIN SODIUM 2 G/50ML
2 SOLUTION INTRAVENOUS SEE ADMIN INSTRUCTIONS
Status: DISCONTINUED | OUTPATIENT
Start: 2023-04-04 | End: 2023-04-04 | Stop reason: HOSPADM

## 2023-04-04 RX ORDER — LIDOCAINE HYDROCHLORIDE 20 MG/ML
INJECTION, SOLUTION INFILTRATION; PERINEURAL PRN
Status: DISCONTINUED | OUTPATIENT
Start: 2023-04-04 | End: 2023-04-04

## 2023-04-04 RX ORDER — ONDANSETRON 2 MG/ML
4 INJECTION INTRAMUSCULAR; INTRAVENOUS EVERY 30 MIN PRN
Status: DISCONTINUED | OUTPATIENT
Start: 2023-04-04 | End: 2023-04-04 | Stop reason: HOSPADM

## 2023-04-04 RX ORDER — SCOLOPAMINE TRANSDERMAL SYSTEM 1 MG/1
1 PATCH, EXTENDED RELEASE TRANSDERMAL ONCE
Status: DISCONTINUED | OUTPATIENT
Start: 2023-04-04 | End: 2023-04-05 | Stop reason: HOSPADM

## 2023-04-04 RX ORDER — ACETAMINOPHEN 325 MG/1
975 TABLET ORAL ONCE
Status: COMPLETED | OUTPATIENT
Start: 2023-04-04 | End: 2023-04-04

## 2023-04-04 RX ORDER — SODIUM CHLORIDE, SODIUM LACTATE, POTASSIUM CHLORIDE, CALCIUM CHLORIDE 600; 310; 30; 20 MG/100ML; MG/100ML; MG/100ML; MG/100ML
INJECTION, SOLUTION INTRAVENOUS CONTINUOUS
Status: DISCONTINUED | OUTPATIENT
Start: 2023-04-04 | End: 2023-04-04 | Stop reason: HOSPADM

## 2023-04-04 RX ORDER — OXYCODONE HYDROCHLORIDE 5 MG/1
5 TABLET ORAL EVERY 4 HOURS PRN
Status: COMPLETED | OUTPATIENT
Start: 2023-04-04 | End: 2023-04-04

## 2023-04-04 RX ORDER — PROPOFOL 10 MG/ML
INJECTION, EMULSION INTRAVENOUS CONTINUOUS PRN
Status: DISCONTINUED | OUTPATIENT
Start: 2023-04-04 | End: 2023-04-04

## 2023-04-04 RX ORDER — CEFAZOLIN SODIUM 2 G/50ML
2 SOLUTION INTRAVENOUS
Status: COMPLETED | OUTPATIENT
Start: 2023-04-04 | End: 2023-04-04

## 2023-04-04 RX ORDER — INDOCYANINE GREEN AND WATER 25 MG
2.5 KIT INJECTION ONCE
Status: COMPLETED | OUTPATIENT
Start: 2023-04-04 | End: 2023-04-04

## 2023-04-04 RX ORDER — PROPOFOL 10 MG/ML
INJECTION, EMULSION INTRAVENOUS PRN
Status: DISCONTINUED | OUTPATIENT
Start: 2023-04-04 | End: 2023-04-04

## 2023-04-04 RX ORDER — FENTANYL CITRATE 50 UG/ML
50 INJECTION, SOLUTION INTRAMUSCULAR; INTRAVENOUS EVERY 5 MIN PRN
Status: DISCONTINUED | OUTPATIENT
Start: 2023-04-04 | End: 2023-04-04 | Stop reason: HOSPADM

## 2023-04-04 RX ORDER — ONDANSETRON 2 MG/ML
INJECTION INTRAMUSCULAR; INTRAVENOUS PRN
Status: DISCONTINUED | OUTPATIENT
Start: 2023-04-04 | End: 2023-04-04

## 2023-04-04 RX ORDER — LIDOCAINE 40 MG/G
CREAM TOPICAL
Status: DISCONTINUED | OUTPATIENT
Start: 2023-04-04 | End: 2023-04-04 | Stop reason: HOSPADM

## 2023-04-04 RX ORDER — FENTANYL CITRATE 50 UG/ML
25 INJECTION, SOLUTION INTRAMUSCULAR; INTRAVENOUS EVERY 5 MIN PRN
Status: DISCONTINUED | OUTPATIENT
Start: 2023-04-04 | End: 2023-04-04 | Stop reason: HOSPADM

## 2023-04-04 RX ORDER — KETOROLAC TROMETHAMINE 30 MG/ML
INJECTION, SOLUTION INTRAMUSCULAR; INTRAVENOUS PRN
Status: DISCONTINUED | OUTPATIENT
Start: 2023-04-04 | End: 2023-04-04

## 2023-04-04 RX ORDER — OXYCODONE HYDROCHLORIDE 5 MG/1
5-10 TABLET ORAL EVERY 4 HOURS PRN
Qty: 16 TABLET | Refills: 0 | Status: SHIPPED | OUTPATIENT
Start: 2023-04-04 | End: 2023-05-04

## 2023-04-04 RX ADMIN — Medication 100 MCG: at 10:39

## 2023-04-04 RX ADMIN — KETOROLAC TROMETHAMINE 30 MG: 30 INJECTION, SOLUTION INTRAMUSCULAR; INTRAVENOUS at 11:17

## 2023-04-04 RX ADMIN — ACETAMINOPHEN 975 MG: 325 TABLET ORAL at 09:19

## 2023-04-04 RX ADMIN — Medication 100 MCG: at 11:02

## 2023-04-04 RX ADMIN — FENTANYL CITRATE 25 MCG: 50 INJECTION, SOLUTION INTRAMUSCULAR; INTRAVENOUS at 12:05

## 2023-04-04 RX ADMIN — ONDANSETRON 4 MG: 2 INJECTION INTRAMUSCULAR; INTRAVENOUS at 10:18

## 2023-04-04 RX ADMIN — Medication 0.5 MG: at 11:23

## 2023-04-04 RX ADMIN — INDOCYANINE GREEN AND WATER 2.5 MG: KIT at 09:20

## 2023-04-04 RX ADMIN — PROPOFOL 200 MG: 10 INJECTION, EMULSION INTRAVENOUS at 10:23

## 2023-04-04 RX ADMIN — LIDOCAINE HYDROCHLORIDE 100 MG: 20 INJECTION, SOLUTION INFILTRATION; PERINEURAL at 10:23

## 2023-04-04 RX ADMIN — FENTANYL CITRATE 100 MCG: 50 INJECTION, SOLUTION INTRAMUSCULAR; INTRAVENOUS at 10:21

## 2023-04-04 RX ADMIN — Medication 50 MG: at 10:24

## 2023-04-04 RX ADMIN — FENTANYL CITRATE 25 MCG: 50 INJECTION, SOLUTION INTRAMUSCULAR; INTRAVENOUS at 12:14

## 2023-04-04 RX ADMIN — OXYCODONE HYDROCHLORIDE 5 MG: 5 TABLET ORAL at 12:44

## 2023-04-04 RX ADMIN — SCOLOPAMINE TRANSDERMAL SYSTEM 1 PATCH: 1 PATCH, EXTENDED RELEASE TRANSDERMAL at 10:00

## 2023-04-04 RX ADMIN — DEXAMETHASONE SODIUM PHOSPHATE 4 MG: 4 INJECTION, SOLUTION INTRA-ARTICULAR; INTRALESIONAL; INTRAMUSCULAR; INTRAVENOUS; SOFT TISSUE at 10:18

## 2023-04-04 RX ADMIN — GLYCOPYRROLATE 0.2 MG: 0.2 INJECTION, SOLUTION INTRAMUSCULAR; INTRAVENOUS at 10:18

## 2023-04-04 RX ADMIN — Medication 100 MCG: at 10:32

## 2023-04-04 RX ADMIN — CEFAZOLIN SODIUM 2 G: 2 SOLUTION INTRAVENOUS at 10:13

## 2023-04-04 RX ADMIN — Medication 100 MCG: at 10:47

## 2023-04-04 RX ADMIN — PROPOFOL 200 MCG/KG/MIN: 10 INJECTION, EMULSION INTRAVENOUS at 10:25

## 2023-04-04 RX ADMIN — SODIUM CHLORIDE, SODIUM LACTATE, POTASSIUM CHLORIDE, CALCIUM CHLORIDE: 600; 310; 30; 20 INJECTION, SOLUTION INTRAVENOUS at 09:19

## 2023-04-04 NOTE — ANESTHESIA CARE TRANSFER NOTE
Patient: Sherrell Kothari    Procedure: Procedure(s):  CHOLECYSTECTOMY, ROBOT-ASSISTED, LAPAROSCOPIC, WITHOUT CHOLANGIOGRAM       Diagnosis: Cholecystitis [K81.9]  Diagnosis Additional Information: No value filed.    Anesthesia Type:   General     Note:    Oropharynx: oropharynx clear of all foreign objects and spontaneously breathing  Level of Consciousness: awake  Oxygen Supplementation: face mask  Level of Supplemental Oxygen (L/min / FiO2): 8  Independent Airway: airway patency satisfactory and stable  Dentition: dentition unchanged  Vital Signs Stable: post-procedure vital signs reviewed and stable  Report to RN Given: handoff report given  Patient transferred to: PACU    Handoff Report: Identifed the Patient, Identified the Reponsible Provider, Reviewed the pertinent medical history, Discussed the surgical course, Reviewed Intra-OP anesthesia mangement and issues during anesthesia, Set expectations for post-procedure period and Allowed opportunity for questions and acknowledgement of understanding      Vitals:  Vitals Value Taken Time   /66 04/04/23 1136   Temp     Pulse 77 04/04/23 1138   Resp 11 04/04/23 1138   SpO2 99 % 04/04/23 1138   Vitals shown include unvalidated device data.    Electronically Signed By: MANDA Carlisle CRNA  April 4, 2023  11:40 AM

## 2023-04-04 NOTE — DISCHARGE INSTRUCTIONS
Scopolamine Patch- (Absorbed through the skin)    This medicine prevents nausea and vomiting caused by motion sickness or anesthesia.  The medicine is in a patch worn behind the ear.      Do NOT use the Scopolamine Patch if you have glaucoma or are allergic to scopolamine.    How to Use This Medicine:  The patch is applied behind the ear.  Keep the patch dry to prevent it from falling off.  Limit contact with water (no bathing or swimming).    If the patch is loose or falls off throw it away.  You do not need to apply a new patch.  After you take off the patch or if it falls off, wash your hands and the area behind your ear with soap and water.    You can remove the patch tomorrow, or leave on for up to 3 days.  Only one patch should be used at any time.    How to Dispose of This Medicine:  Fold the used patch in half with the sticky sides together. Throw any used patch away so that children or pets cannot get to it. You will also need to throw away old patches after the expiration date has passed.  Keep all medicine away from children and never share your medicine with anyone.    Warnings While Using This Medicine:  This medicine can make you sleepy.  Avoid taking sleeping pills and other medicines that can make you sleepy while the patch is on.  Do not drink alcohol while the patch is on.  This medicine can cause temporary blurring and other vision problems if it comes in contact with the eyes.  This is not serious unless accompanied by eye pain and redness.   This medicine may cause problems with urination. If you have problems with urinating, remove the patch.  If you are unable to urinate, call your doctor.    This medicine may make you dizzy or drowsy. Avoid driving, using machines, or doing anything else that could be dangerous if the patch is on.  This medicine may make you sweat less and cause your body to get too hot. Be careful in hot weather or if you are exercising.  Make sure any doctor or dentist who  treats you knows that you have the patch on. This medicine may affect the results of certain medical tests.  Skin burns have been reported at the patch site in several patients wearing an aluminized transdermal system during a magnetic resonance imaging scan (MRI).  Since this patch contains aluminum, it is recommended to remove the patch if you are having an MRI.    Possible Side Effects While Using This Medicine:  Dry mouth  Drowsiness  Temporary blurring of vision and widening of the pupils    Call your doctor right away if you notice any of these side effects:  Allergic reaction: Itching or hives, swelling in your face or hands, swelling or tingling in your mouth or throat, chest tightness, trouble breathing.  Blurred vision that does not go away after the patch is removed  Confusion or memory loss  Fast,slow, or uneven heartbeat  Lightheadedness, dizziness, drowsiness, or fainting  Seeing, hearing, or feeling things that are not there  Restlessness  Severe eye pain  Trouble urinating    If you notice other side effects that you think are caused by this medicine, call your doctor immediately.         [facility]  Home Care Following Anesthesia  For 24 hours after surgery:  Get plenty of rest.  A responsible adult must stay with you for at least 24 hours after you leave the surgery center.  Do not drive or use heavy equipment.  If you have weakness or tingling, don't drive or use heavy equipment until this feeling goes away.   Do not drink alcohol.   Avoid strenuous or risky activities.  Ask for help when climbing stairs.  You may feel lightheaded.  IF so, sit for a few minutes before standing.  Have someone help you get up.   If you have nausea (feel sick to your stomach): Drink only clear liquids such as apple juice, ginger ale, broth or 7-Up.  Rest may also help.  Be sure to drink enough fluids.  Move to a regular diet as you feel able.   You may have a slight fever.  Call  the doctor if your fever is over 100 F (37.7 C) (taken under the tongue) or lasts longer than 24 hours.  You may have a dry mouth, a sore throat, muscle aches or trouble sleeping. These should go away after 24 hours.  Do not make important or legal decisions.   It is recommended to avoid smoking.   If you use hormonal birth control (such as the pill, patch, ring or implants):  You will need a second form of birth control for 7 days (condoms, a diaphragm or contraceptive foam).  While in the surgery center, you received a medicine called Sugammadex.  Hormonal birth control (such as the pill, patch, ring or implants) will not work as well for a week after taking this medicine.         Tips for taking pain medications  To get the best pain relief possible, remember these points:  Take pain medications as directed, before pain becomes severe.  Pain medication can upset your stomach: taking it with food may help.  Constipation is a common side effect of pain medication. Drink plenty of  fluids.  Eat foods high in fiber. Take a stool softener if recommended by your doctor or pharmacist.  Do not drink alcohol, drive or operate machinery while taking pain medications.  Ask about other ways to control pain, such as with heat, ice or relaxation.    Tylenol/Acetaminophen Consumption  To help encourage the safe use of acetaminophen, the makers of TYLENOL  have lowered the maximum daily dose for single-ingredient Extra Strength TYLENOL  (acetaminophen) products sold in the [place] from 8 pills per day (4,000 mg) to 6 pills per day (3,000 mg). The dosing interval has also changed from 2 pills every 4-6 hours to 2 pills every 6 hours.  If you feel your pain relief is insufficient, you may take Tylenol/Acetaminophen in addition to your narcotic pain medication.   Be careful not to exceed 3,000 mg of Tylenol/Acetaminophen in a 24 hour period from all sources.  If you are taking extra strength Tylenol/acetaminophen (500 mg), the maximum  dose is 6 tablets in 24 hours.  If you are taking regular strength acetaminophen (325 mg), the maximum dose is 9 tablets in 24 hours.  ***You last took tylenol/acetaminophen at 9:19 am. You may take more at 3:20pm.    Call a doctor for any of the following:  Signs of infection (fever, growing tenderness at the surgery site, a large amount of drainage or bleeding, severe pain, foul-smelling drainage, redness, swelling).  It has been over 8 to 10 hours since surgery and you are still not able to urinate (pass water).  Headache for over 24 hours.  Signs of Covid-19 infection (temperature over 100 degrees, shortness of breath, cough, loss of taste/smell, generalized body aches, persistent headache, chills, sore throat, nausea/vomiting/diarrhea)  Your doctor is:       Dr. Jey Rojas, General Surgery: [phone]               Or dial [phone] and ask for the resident on call for:  General Surgery  For emergency care, call the:  [facility]:  [phone] (TTY for hearing impaired: [phone])

## 2023-04-04 NOTE — ANESTHESIA POSTPROCEDURE EVALUATION
Patient: Sherrell Kothari    Procedure: Procedure(s):  CHOLECYSTECTOMY, ROBOT-ASSISTED, LAPAROSCOPIC, WITHOUT CHOLANGIOGRAM       Anesthesia Type:  General    Note:  Disposition: Outpatient   Postop Pain Control: Uneventful            Sign Out: Well controlled pain   PONV: No   Neuro/Psych: Uneventful            Sign Out: Acceptable/Baseline neuro status   Airway/Respiratory: Uneventful            Sign Out: Acceptable/Baseline resp. status   CV/Hemodynamics: Uneventful            Sign Out: Acceptable CV status; No obvious hypovolemia; No obvious fluid overload   Other NRE: NONE   DID A NON-ROUTINE EVENT OCCUR? No           Last vitals:  Vitals Value Taken Time   /66 04/04/23 1215   Temp 36.6  C (97.8  F) 04/04/23 1205   Pulse 75 04/04/23 1215   Resp 11 04/04/23 1215   SpO2 100 % 04/04/23 1215       Electronically Signed By: Ben Wallis MD, MD  April 4, 2023  12:40 PM

## 2023-04-04 NOTE — OP NOTE
[facility]    Operative Note    Pre-operative diagnosis: Cholecystitis [K81.9];    Post-operative diagnosis Chronic calculous cholecystitis*   Procedure: Procedure(s):  CHOLECYSTECTOMY, ROBOT-ASSISTED, LAPAROSCOPIC, WITHOUT CHOLANGIOGRAM   Surgeon: Surgeon(s) and Role:     * Jey Rojas MD - Primary   Anesthesia: General    Estimated blood loss: 20 ml   Drains: None   Specimens: ID Type Source Tests Collected by Time Destination   1 : Gallbladder with contents Tissue Gallbladder SURGICAL PATHOLOGY EXAM Jey Rojas MD 4/4/2023 11:12 AM       Findings: gallbladder with chronic inflammation, stones and omental adhesions.  T   Complications: None.   Implants: None.       OPERATIVE INDICATIONS:  Sherrell Kothari is a 62 year old female with a history of RUQ abdominal pain associated with cholelithiasis on right upper quadrant ultrasound.      After understanding the risks and benefits of proceeding with surgery, the patient has an indication for laparoscopic cholecystectomy and consented to undergo surgery.      Prior to surgery, I reviewed the risks of gallbladder removal with this patient.    The risk discussion included, but was not limited to, death, myocardial infarction, pneumonia, urinary tract infection, deep venous thrombosis with or without pulmonary embolus, abdominal infection from bowel injury or abscess, bowel obstruction, wound infection, and bleeding.    Specific risks related to cholecystectomy include bile duct injury (3-4/1000), bile leak (10/1000), retained common bile duct stone (10/1000), postcholecystectomy diarrhea (1-2%) and these complications may require additional treatment.    The potential that gallbladder removal will NOT be of benefit was also reviewed.    Furthermore, alternative therapies and the option for no therapy were also reviewed.    Postoperative treatment and expected follow-up were also reviewed.        OPERATIVE  DETAILS:      The patient was brought to the operating room and prepared in a routine fashion. Bed was flexed and placed in reverse Trendelenberg.  A timeout was performed prior to surgery and documented by the nursing team.     Under the benefits of general anesthesia, a left upper quadrant Veress needle was inserted and pneumoperitoneum was established using carbon dioxide gas to a maximum pressure of 15 mmHg.  A total of 4 8mm ports were placed and the laparoscope was utilized from the umbilical port.     The robot was docked.     Numerous adhesions to the gallbladder were taken down with a combination of blunt and sharp dissection.     The gallbladder was grasped at its fundus and retracted cephalad.  It was also grasped at its infundibulum and retracted laterally.       Findings related to the gallbladder are as noted above.     A complete dissection starting on the gallbladder, identifying the cystic artery on the surface of the gallbladder, was performed.  The cystic artery in this manner was  away from the gallbladder. The infundibulum of the gallbladder and the junction of gallbladder and cystic duct were clearly and completely identified with blunt dissection.  All of this dissection was performed on the gallbladder wall and clearly above the triangle of Calot.    The peritoneum on each side of gallbladder was divided at least one half of the way up towards the top of the gallbladder.     Next, a complete dissection of the upper aspect of the triangle of Calot was performed and the critical view of safety was achieved.  The cystic artery and cystic duct were then the ONLY two structures traversing from gallbladder towards the jose r hepatis.     The cystic artery was taken with bipolar cautery, and cystic duct was clipped securelyx2 stay, 1 specimen, and divided between clips. The gallbladder was then removed out of its fossa using electrocautery. ICG was used liberally to confrim biliary  structures.     The gallladder was placed in Endocatch bag and the robot was undocked.     Complete hemostasis was achieved.     25 ml local (see MAR for type) was used to infiltrate the skin incisions. The extraction incision was irrigated with sterile water copiously.    The skin was closed using 4-0 monocryl suture and skin glue was applied.     I was present for all critical components of the operation, and all needle and sponge counts were correct x2 at the end of the procedure.    Jey Rojas MD

## 2023-04-04 NOTE — PROGRESS NOTES
Prior to surgery, I reviewed the risks of gallbladder removal with this patient.    The risk discussion included, but was not limited to, death, myocardial infarction, pneumonia, urinary tract infection, deep venous thrombosis with or without pulmonary embolus, abdominal infection from bowel injury or abscess, bowel obstruction, wound infection, and bleeding.    Specific risks related to cholecystectomy include bile duct injury (3-4/1000), bile leak (10/1000), retained common bile duct stone (10/1000), postcholecystectomy diarrhea (1-2%) and these complications may require additional treatment.    The potential that gallbladder removal will NOT be of benefit was also reviewed.    Furthermore, alternative therapies and the option for no therapy were also reviewed.    Postoperative treatment and expected follow-up were also reviewed.    Jey Rojas MD

## 2023-04-04 NOTE — ANESTHESIA PREPROCEDURE EVALUATION
Anesthesia Pre-Procedure Evaluation    Patient: Sherrell Kothari   MRN: [id number] : 1960        Procedure : Procedure(s):  CHOLECYSTECTOMY, ROBOT-ASSISTED, LAPAROSCOPIC, WITHOUT CHOLANGIOGRAM          Past Medical History:   Diagnosis Date     Allergic rhinitis, cause unspecified     Allergic rhinitis     Carcinoma in situ of skin of other and unspecified parts of face     squamous cell skin cancer upper lip     Depressive disorder unknown    anxiety non clinical     Depressive disorder, not elsewhere classified      Diaphragmatic hernia without mention of obstruction or gangrene     hiatal hernia     Essential hypertension, benign      Helicobacter pylori (H. pylori)     TREATED     Hypersomnia with sleep apnea, unspecified     CPAP     Mixed hyperlipidemia      Other chest pain 2004    non-cardiac, cardiology,  nl stress test     Other forms of migraine, without mention of intractable migraine without mention of status migrainosus     intermittent, formerly treated with Zomig     Squamous cell carcinoma     Mohs surgery     Syncope ER     NL echo, head CT     Temporomandibular disorder      Unspecified hypothyroidism       Past Surgical History:   Procedure Laterality Date     BIOPSY OF SKIN LESION       BREAST SURGERY   nishant    breast reduction     COLONOSCOPY  ongoing     ESOPHAGOSCOPY, GASTROSCOPY, DUODENOSCOPY (EGD), COMBINED  2011    Procedure:COMBINED ESOPHAGOSCOPY, GASTROSCOPY, DUODENOSCOPY (EGD), BIOPSY SINGLE OR MULTIPLE; Surgeon:JOSE L FOOTE; Location:[facility]     [facility] CT HEAD WO CONTRAST       HC ESOPHAGOSCOPY, DIAGNOSTIC      Hiatal Hernia     HC HYSTEROSCOPY, SURGICAL; W/ ENDOMETRIAL ABLATION, ANY METHOD N/A      HC REMOVAL OF TONSILS,<10 Y/O      Tonsils <11y.o.     HYSTEROSCOPY  2009    with endometrial ablation     MOHS MICROGRAPHIC PROCEDURE       PELVIS LAPAROSCOPY,DX  1996    Laparoscopy, benign fatty tumor LLQ     SURGICAL HISTORY OF -   2005     [facility] Blepharoplasty     SURGICAL HISTORY OF -   2006    [facility] Breast Reduction     ZZC ECHO HEART XTHORACIC,COMPLETE, W/O DOPPLER  2009    EF 60%      Allergies   Allergen Reactions     Latex Swelling and Rash     Issues with Latex Condoms in the past     Sulfa Drugs Rash     Rash, hives        Social History     Tobacco Use     Smoking status: Former     Packs/day: 0.00     Years: 3.00     Pack years: 0.00     Types: Cigarettes     Start date: 1975     Quit date: 1978     Years since quittin.2     Smokeless tobacco: Never     Tobacco comments:     I was a teenager quit at age 17   Vaping Use     Vaping status: Never Used   Substance Use Topics     Alcohol use: Yes     Comment: casual. occasional cocktail after work.  Some on a week-end      Wt Readings from Last 1 Encounters:   23 95.2 kg (209 lb 14.4 oz)        Anesthesia Evaluation            ROS/MED HX  ENT/Pulmonary:     (+) sleep apnea, uses CPAP,     Neurologic:       Cardiovascular:     (+) hypertension-----fainting (syncope).     METS/Exercise Tolerance:     Hematologic:       Musculoskeletal:       GI/Hepatic:       Renal/Genitourinary:       Endo:     (+) thyroid problem, hypothyroidism, Obesity,     Psychiatric/Substance Use:       Infectious Disease:       Malignancy:       Other:               OUTSIDE LABS:  CBC:   Lab Results   Component Value Date    WBC 9.0 2023    WBC 8.8 2023    HGB 11.8 2023    HGB 10.6 (L) 2023    HCT 37.6 2023    HCT 34.3 (L) 2023     2023     2023     BMP:   Lab Results   Component Value Date     2023     2022    POTASSIUM 4.0 2023    POTASSIUM 3.8 2022    CHLORIDE 106 2023    CHLORIDE 108 2022    CO2 24 2023    CO2 26 2022    BUN 16.4 2023    BUN 18 2022    CR 0.92 2023    CR 1.00 2022     (H) 2023     (H) 2022     COAGS:    Lab Results   Component Value Date    PTT 28 05/05/2009    INR 1.01 01/07/2015     POC:   Lab Results   Component Value Date    HCG Negative 12/03/2009     HEPATIC:   Lab Results   Component Value Date    ALBUMIN 4.5 03/17/2023    PROTTOTAL 7.8 03/17/2023    ALT 20 03/17/2023    AST 25 03/17/2023    ALKPHOS 60 03/17/2023    BILITOTAL 0.3 03/17/2023     OTHER:   Lab Results   Component Value Date    A1C 6.2 (H) 02/09/2023    ROCIO 9.2 02/09/2023    PHOS 3.9 02/10/2011    MAG 2.1 08/14/2014    LIPASE 173 01/07/2015    TSH 0.83 02/09/2023    T4 0.90 06/21/2016    T3 111 12/09/2010    CRP <3.0 01/13/2009    SED 12 11/30/2020       Anesthesia Plan    ASA Status:  2      Anesthesia Type: General.     - Airway: ETT              Consents    Anesthesia Plan(s) and associated risks, benefits, and realistic alternatives discussed. Questions answered and patient/representative(s) expressed understanding.     - Discussed: Risks, Benefits and Alternatives for BOTH SEDATION and the PROCEDURE were discussed     - Discussed with:  Patient      - Extended Intubation/Ventilatory Support Discussed: No.      - Patient is DNR/DNI Status: No    Use of blood products discussed: No .     Postoperative Care    Pain management: IV analgesics, Oral pain medications.   PONV prophylaxis: Ondansetron (or other 5HT-3), Dexamethasone or Solumedrol     Comments:                Ben Wallis MD, MD

## 2023-04-04 NOTE — INTERVAL H&P NOTE
"I have reviewed the surgical (or preoperative) H&P that is linked to this encounter, and examined the patient. There are no significant changes    Clinical Conditions Present on Arrival:  Clinically Significant Risk Factors Present on Admission                    # Obesity: Estimated body mass index is 37.19 kg/m  as calculated from the following:    Height as of this encounter: 1.6 m (5' 2.99\").    Weight as of this encounter: 95.2 kg (209 lb 14.4 oz).       "

## 2023-04-06 ENCOUNTER — MYC MEDICAL ADVICE (OUTPATIENT)
Dept: SURGERY | Facility: CLINIC | Age: 63
End: 2023-04-06
Payer: COMMERCIAL

## 2023-04-06 NOTE — TELEPHONE ENCOUNTER
RN Post-Op/Post-Discharge Care Coordination Note    Ms. Sherrell Kothari is a 62 year old female who underwent robotic cholecystectomy on 4/4 with  Dr. Jey Rojas.  Spoke with Patient.    Support  Patient able to care for self independently     Health Status  Nausea/Vomiting: Patient denies nausea/vomiting.  Eating/drinking: Patient is able to eat and drink without any complaints.  Bowel habits: Patient reports no bowel movement since surgery. Started stool softener last night  Drains (LYLA): N/A  Fevers/chills: Patient denies any fever or chills.  Incisions: Patient denies any signs and symptoms of infection..  Wound closure:  Skin Sealant  Pain: just a little pain  New Medications:  Oxycodone has taken 4 pills since she got home. Will start Tylenol    Activity/Restrictions  No lifting in excess of 15-20 pounds for 3-4 weeks    Equipment  None    Pathology reviewed with patient:  N/A    Forms/Letters  Yes    All of her questions were answered including reviewing restrictions, and wound care.  She will call this office if she has any further questions and/or concerns.       In lieu of a post-op clinic patient that patient would like to be contacted in approximately 7-10 days via telephone.    A copy of this note was routed to the primary surgeon.      Whom and When to Call  Patient acknowledges understanding of how to manage any medication changes and   when to seek medical care.     Patient advised that if after hour medical concerns arise to please call [phone] and choose option 4 to speak to the physician on call.

## 2023-04-06 NOTE — TELEPHONE ENCOUNTER
"Kya-Please review patient's message:  1. Would you like virtual visit to discuss switching to Mounjaro?  2. CPAP supply orders pended    \"Qamar Boland,  couple things:  1) Dr. Jey Rojas, whom did my surgery yesterday (asked about my medications) told me that I should mention to you the possibility of going on to Mounjaro (Tirzepatide).  He thinks its newer and better. (I'm concerned about changing from one med to the other, and concerned about Insurance coverage. (Wegovy is giving me nausea and bad heartburn and gassiness).  2) I've been going back & forth for the past months regarding the obtaining of  Wegovy at 1. mg.  I finished my 4 weeks at .50 last Friday.  The pharmacy said they had a script for Ozempic, but they cannot give that to me as its a different prescription and billed differently by insurance.   A nurse promised  she'd get the correct script over to the pharmacy (Wagovy 1.)  I  just got another text from the pharmacy - have not gotten the updated/correct script from clinic. They asked me to again connect with you.  Friday is quickly approaching and I don't have my next dose of Wagovy 1.00 for the next 4 weeks. (I don't know if they'll be able to get it in at this late time.)  3).  Last, please send a prescription to  [facility].  I NEED updated cpap supplies, i.e. head guard, hose, over-the nose mask and humidifier chamber.  [facility]' said they need an updated script (as mine is over a year old).  My head guard  is duct taped.  They need a script faxed /sent to: [phone] -  [facility].  I have a Resmed, Airsense 10 Apap machine.  Thank you!   Sophia cell: [phone]\"    Writer responded via [employer].    RASHEED AlejandreN, RN-[facility]      "

## 2023-04-06 NOTE — LETTER
[facility] GENERAL SURGERY CLINIC [place] [address] [facility]  [address] FLOOR  [facility] [address]  Phone: [phone]  Fax: [phone]    April 6, 2023        Sherrell DANIEL McNeil  [address]  [facility] [address]          To whom it may concern:    RE: Sherrell DANIEL McNeil    Patient may return to work 4/17/23 with the following:  Light duty-unable to lift more than 10 pounds for 4 weeks    Please contact me for questions or concerns.      Sincerely,    Jessica Mart RN, BSN, CCRN  [phone]

## 2023-04-07 DIAGNOSIS — R11.0 NAUSEA: ICD-10-CM

## 2023-05-04 ENCOUNTER — OFFICE VISIT (OUTPATIENT)
Dept: FAMILY MEDICINE | Facility: CLINIC | Age: 63
End: 2023-05-04
Payer: COMMERCIAL

## 2023-05-04 VITALS
SYSTOLIC BLOOD PRESSURE: 122 MMHG | WEIGHT: 204.08 LBS | DIASTOLIC BLOOD PRESSURE: 86 MMHG | HEART RATE: 81 BPM | OXYGEN SATURATION: 95 % | BODY MASS INDEX: 36.16 KG/M2 | TEMPERATURE: 98 F | RESPIRATION RATE: 16 BRPM | HEIGHT: 63 IN

## 2023-05-04 DIAGNOSIS — E66.01 MORBID OBESITY (H): Primary | ICD-10-CM

## 2023-05-04 DIAGNOSIS — D50.9 IRON DEFICIENCY ANEMIA, UNSPECIFIED IRON DEFICIENCY ANEMIA TYPE: ICD-10-CM

## 2023-05-04 DIAGNOSIS — Z23 ENCOUNTER FOR IMMUNIZATION: ICD-10-CM

## 2023-05-04 PROBLEM — K81.9 CHOLECYSTITIS: Status: RESOLVED | Noted: 2023-03-27 | Resolved: 2023-05-04

## 2023-05-04 LAB
ERYTHROCYTE [DISTWIDTH] IN BLOOD BY AUTOMATED COUNT: 18.2 % (ref 10–15)
FERRITIN SERPL-MCNC: 17 NG/ML (ref 11–328)
HCT VFR BLD AUTO: 38.8 % (ref 35–47)
HGB BLD-MCNC: 12.4 G/DL (ref 11.7–15.7)
IRON BINDING CAPACITY (ROCHE): 415 UG/DL (ref 240–430)
IRON SATN MFR SERPL: 8 % (ref 15–46)
IRON SERPL-MCNC: 34 UG/DL (ref 37–145)
MCH RBC QN AUTO: 25.8 PG (ref 26.5–33)
MCHC RBC AUTO-ENTMCNC: 32 G/DL (ref 31.5–36.5)
MCV RBC AUTO: 81 FL (ref 78–100)
PLATELET # BLD AUTO: 300 10E3/UL (ref 150–450)
RBC # BLD AUTO: 4.81 10E6/UL (ref 3.8–5.2)
WBC # BLD AUTO: 7.1 10E3/UL (ref 4–11)

## 2023-05-04 PROCEDURE — 36415 COLL VENOUS BLD VENIPUNCTURE: CPT | Performed by: PHYSICIAN ASSISTANT

## 2023-05-04 PROCEDURE — 99214 OFFICE O/P EST MOD 30 MIN: CPT | Mod: 25 | Performed by: PHYSICIAN ASSISTANT

## 2023-05-04 PROCEDURE — 83540 ASSAY OF IRON: CPT | Performed by: PHYSICIAN ASSISTANT

## 2023-05-04 PROCEDURE — 83550 IRON BINDING TEST: CPT | Performed by: PHYSICIAN ASSISTANT

## 2023-05-04 PROCEDURE — 82728 ASSAY OF FERRITIN: CPT | Performed by: PHYSICIAN ASSISTANT

## 2023-05-04 PROCEDURE — 85027 COMPLETE CBC AUTOMATED: CPT | Performed by: PHYSICIAN ASSISTANT

## 2023-05-04 PROCEDURE — 90471 IMMUNIZATION ADMIN: CPT | Performed by: PHYSICIAN ASSISTANT

## 2023-05-04 PROCEDURE — 90746 HEPB VACCINE 3 DOSE ADULT IM: CPT | Performed by: PHYSICIAN ASSISTANT

## 2023-05-04 RX ORDER — PHENTERMINE HYDROCHLORIDE 15 MG/1
15 CAPSULE ORAL EVERY MORNING
Qty: 30 CAPSULE | Refills: 2 | Status: SHIPPED | OUTPATIENT
Start: 2023-05-04 | End: 2023-08-15

## 2023-05-04 ASSESSMENT — PAIN SCALES - GENERAL: PAINLEVEL: NO PAIN (0)

## 2023-05-04 NOTE — NURSING NOTE
Prior to immunization administration, verified patients identity using patient s name and date of birth. Please see Immunization Activity for additional information.     Screening Questionnaire for Adult Immunization    Are you sick today?   No   Do you have allergies to medications, food, a vaccine component or latex?   No   Have you ever had a serious reaction after receiving a vaccination?   No   Do you have a long-term health problem with heart, lung, kidney, or metabolic disease (e.g., diabetes), asthma, a blood disorder, no spleen, complement component deficiency, a cochlear implant, or a spinal fluid leak?  Are you on long-term aspirin therapy?   No   Do you have cancer, leukemia, HIV/AIDS, or any other immune system problem?   No   Do you have a parent, brother, or sister with an immune system problem?   No   In the past 3 months, have you taken medications that affect  your immune system, such as prednisone, other steroids, or anticancer drugs; drugs for the treatment of rheumatoid arthritis, Crohn s disease, or psoriasis; or have you had radiation treatments?   No   Have you had a seizure, or a brain or other nervous system problem?   No   During the past year, have you received a transfusion of blood or blood    products, or been given immune (gamma) globulin or antiviral drug?   No   For women: Are you pregnant or is there a chance you could become       pregnant during the next month?   No   Have you received any vaccinations in the past 4 weeks?   No     Immunization questionnaire answers were all negative.      Injection of HEP B given by Autumn Meredith MA. Patient instructed to remain in clinic for 15 minutes afterwards, and to report any adverse reactions.     Screening performed by Autumn Meredith MA on 5/4/2023 at 11:07 AM.

## 2023-05-04 NOTE — PROGRESS NOTES
"  Assessment & Plan     Morbid obesity (H) - given side effects with wegovy, will switch to phentermine. Start lower dose 15mg and follow up in 3 months.  - phentermine (ADIPEX-P) 15 MG capsule  Dispense: 30 capsule; Refill: 2  - PRIMARY CARE FOLLOW-UP SCHEDULING    Iron deficiency anemia, unspecified iron deficiency anemia type - recheck to verify stability, has been off of iron for 1 month  - CBC with platelets  - Iron and iron binding capacity  - Ferritin  - CBC with platelets  - Iron and iron binding capacity  - Ferritin    Encounter for immunization  - HEPATITIS B VACCINE, ADULT, IM    LIZETH Romero [facility]      Heidi Cortes is a 62 year old, presenting for the following health issues:  Follow Up        5/4/2023    10:18 AM   Additional Questions   Roomed by Autumn     Recently had cholecystectomy - stopped wegovy after surgery because she was having so much constipation and stomach upset  She restarted wegovy and had recurrent nausea, upset stomach like she had in the past so has stopped again  She started probiotics at recommendation of dietician     History of Present Illness       Reason for visit:  Weight x 2 hep b shot x pneumonia shot x lungs    She eats 2-3 servings of fruits and vegetables daily.She consumes 1 sweetened beverage(s) daily.She exercises with enough effort to increase her heart rate 20 to 29 minutes per day.  She exercises with enough effort to increase her heart rate 4 days per week.   She is taking medications regularly.         Objective    /86 (BP Location: Right arm, Patient Position: Sitting, Cuff Size: Adult Large)   Pulse 81   Temp 98  F (36.7  C) (Temporal)   Resp 16   Ht 1.6 m (5' 3\")   Wt 92.6 kg (204 lb 1.3 oz)   LMP 04/01/2013   SpO2 95%   BMI 36.15 kg/m    Body mass index is 36.15 kg/m .  Physical Exam   GENERAL: healthy, alert and no distress  LYMPH: no cervical, supraclavicular, axillary, or inguinal adenopathy  "

## 2023-05-08 NOTE — ADDENDUM NOTE
Addendum  created 05/08/23 1050 by Ben Wallis MD    Attestation recorded in Intraprocedure, Intraprocedure Attestations filed

## 2023-05-25 RX ORDER — SEMAGLUTIDE 1.34 MG/ML
INJECTION, SOLUTION SUBCUTANEOUS
Refills: 0 | OUTPATIENT
Start: 2023-05-25

## 2023-07-06 RX ORDER — ONDANSETRON 8 MG/1
TABLET, ORALLY DISINTEGRATING ORAL
Qty: 12 TABLET | Refills: 1 | OUTPATIENT
Start: 2023-07-06

## 2023-08-12 DIAGNOSIS — E66.01 MORBID OBESITY (H): ICD-10-CM

## 2023-08-14 NOTE — TELEPHONE ENCOUNTER
Refill denied -   Weeks 1-4: 0.25mg weekly  Weeks 5-8: 0.5mg weekly  Weeks 9-12: 1mg weekly    Should still be on 0.25mg weekly dose    Can you please clarify with patient?  Kya Herring PA-C     independent

## 2023-08-15 RX ORDER — PHENTERMINE HYDROCHLORIDE 15 MG/1
CAPSULE ORAL
Qty: 30 CAPSULE | Refills: 0 | Status: SHIPPED | OUTPATIENT
Start: 2023-08-15 | End: 2023-08-31

## 2023-08-17 ENCOUNTER — TELEPHONE (OUTPATIENT)
Dept: FAMILY MEDICINE | Facility: CLINIC | Age: 63
End: 2023-08-17
Payer: COMMERCIAL

## 2023-08-21 NOTE — TELEPHONE ENCOUNTER
Central Prior Authorization Team   Phone: [phone]    PA Initiation    Medication: phentermine (ADIPEX-P) 15 MG capsule   Insurance Company: [employer] - Phone [phone] Fax [phone]  Pharmacy Filling the Rx: [id number] [place] [address]  Filling Pharmacy Phone: [phone]  Filling Pharmacy Fax:    Start Date: 8/21/2023

## 2023-08-23 NOTE — TELEPHONE ENCOUNTER
PRIOR AUTHORIZATION DENIED    Medication: phentermine (ADIPEX-P) 15 MG capsule     Denial Date: 8/23/2023    Denial Rational: Insurance will only cover 3 months of this medication per year        Appeal Information: Review the plan's reasons for denial listed above. Please utilize that information to complete letter and provide specific, detailed clinical information/rationale of your patient's health status to address their denial reasons.

## 2023-08-23 NOTE — TELEPHONE ENCOUNTER
Pt should be using goodrx (around $18/month) if insurance is not covering. She has an appt next week we can discuss this as well.    Kya Herring PA-C

## 2023-08-23 NOTE — TELEPHONE ENCOUNTER
Kya: do you have an alternative plan or do you wish us to simply inform patient of phentermine PA denial?    Karley QUEZADA RN  [facility]

## 2023-08-31 ENCOUNTER — TELEPHONE (OUTPATIENT)
Dept: FAMILY MEDICINE | Facility: CLINIC | Age: 63
End: 2023-08-31

## 2023-08-31 ENCOUNTER — OFFICE VISIT (OUTPATIENT)
Dept: FAMILY MEDICINE | Facility: CLINIC | Age: 63
End: 2023-08-31
Attending: PHYSICIAN ASSISTANT
Payer: COMMERCIAL

## 2023-08-31 VITALS
DIASTOLIC BLOOD PRESSURE: 89 MMHG | TEMPERATURE: 97.9 F | HEART RATE: 78 BPM | SYSTOLIC BLOOD PRESSURE: 137 MMHG | HEIGHT: 63 IN | WEIGHT: 196 LBS | OXYGEN SATURATION: 99 % | BODY MASS INDEX: 34.73 KG/M2

## 2023-08-31 DIAGNOSIS — E66.01 MORBID OBESITY (H): Primary | ICD-10-CM

## 2023-08-31 DIAGNOSIS — Z23 ENCOUNTER FOR IMMUNIZATION: ICD-10-CM

## 2023-08-31 DIAGNOSIS — R10.31 BILATERAL GROIN PAIN: ICD-10-CM

## 2023-08-31 DIAGNOSIS — R10.32 BILATERAL GROIN PAIN: ICD-10-CM

## 2023-08-31 DIAGNOSIS — Z12.31 VISIT FOR SCREENING MAMMOGRAM: ICD-10-CM

## 2023-08-31 DIAGNOSIS — R10.9 RIGHT FLANK PAIN: ICD-10-CM

## 2023-08-31 DIAGNOSIS — K59.09 CHRONIC CONSTIPATION: ICD-10-CM

## 2023-08-31 DIAGNOSIS — L72.0 EPIDERMAL CYST: ICD-10-CM

## 2023-08-31 DIAGNOSIS — E03.4 HYPOTHYROIDISM DUE TO ACQUIRED ATROPHY OF THYROID: ICD-10-CM

## 2023-08-31 PROCEDURE — 90471 IMMUNIZATION ADMIN: CPT | Performed by: PHYSICIAN ASSISTANT

## 2023-08-31 PROCEDURE — 99214 OFFICE O/P EST MOD 30 MIN: CPT | Mod: 25 | Performed by: PHYSICIAN ASSISTANT

## 2023-08-31 PROCEDURE — 90746 HEPB VACCINE 3 DOSE ADULT IM: CPT | Performed by: PHYSICIAN ASSISTANT

## 2023-08-31 RX ORDER — LEVOTHYROXINE SODIUM 88 UG/1
88 TABLET ORAL DAILY
Qty: 90 TABLET | Refills: 3 | Status: SHIPPED | OUTPATIENT
Start: 2023-08-31 | End: 2024-05-16

## 2023-08-31 RX ORDER — PHENTERMINE HYDROCHLORIDE 15 MG/1
CAPSULE ORAL
Qty: 30 CAPSULE | Refills: 0 | Status: CANCELLED | OUTPATIENT
Start: 2023-08-31

## 2023-08-31 RX ORDER — PHENTERMINE HYDROCHLORIDE 37.5 MG/1
37.5 TABLET ORAL
Qty: 30 TABLET | Refills: 5 | Status: SHIPPED | OUTPATIENT
Start: 2023-08-31 | End: 2024-05-16

## 2023-08-31 NOTE — TELEPHONE ENCOUNTER
PRIOR AUTHORIZATION DENIED    Medication: PHENTERMINE HCL 37.5 MG PO TABS  Insurance Company: [facility] [employer] - Phone [phone] Fax [phone]  Denial Date: 8/31/2023  Denial Rational:     Appeal Information:     Patient Notified: No

## 2023-08-31 NOTE — NURSING NOTE
Prior to immunization administration, verified patients identity using patient s name and date of birth. Please see Immunization Activity for additional information.     Screening Questionnaire for Adult Immunization    Are you sick today?   No   Do you have allergies to medications, food, a vaccine component or latex?   No   Have you ever had a serious reaction after receiving a vaccination?   No   Do you have a long-term health problem with heart, lung, kidney, or metabolic disease (e.g., diabetes), asthma, a blood disorder, no spleen, complement component deficiency, a cochlear implant, or a spinal fluid leak?  Are you on long-term aspirin therapy?   No   Do you have cancer, leukemia, HIV/AIDS, or any other immune system problem?   No   Do you have a parent, brother, or sister with an immune system problem?   No   In the past 3 months, have you taken medications that affect  your immune system, such as prednisone, other steroids, or anticancer drugs; drugs for the treatment of rheumatoid arthritis, Crohn s disease, or psoriasis; or have you had radiation treatments?   No   Have you had a seizure, or a brain or other nervous system problem?   No   During the past year, have you received a transfusion of blood or blood    products, or been given immune (gamma) globulin or antiviral drug?   No   For women: Are you pregnant or is there a chance you could become       pregnant during the next month?   No   Have you received any vaccinations in the past 4 weeks?   No     Immunization questionnaire answers were all negative.      Patient instructed to remain in clinic for 15 minutes afterwards, and to report any adverse reactions.     Screening performed by Shirley Gutiérrez CMA on 8/31/2023 at 11:13 AM.

## 2023-08-31 NOTE — PROGRESS NOTES
Assessment & Plan     Morbid obesity (H) - We will increase her phentermine to 37.5 mg. If we need to back down back to the 15 mg due to side effects, we can.   - PRIMARY CARE FOLLOW-UP SCHEDULING  - phentermine (ADIPEX-P) 37.5 MG tablet  Dispense: 30 tablet; Refill: 5    Hypothyroidism due to acquired atrophy of thyroid - stable, meds refilled  - levothyroxine (SYNTHROID/LEVOTHROID) 88 MCG tablet  Dispense: 90 tablet; Refill: 3    Epidermal cyst - She was advised to do warm compresses on them to help soften it. Could consider excision if needed.    Chronic constipation  Right flank pain - agree with plan for managing constipation to see if this provides improvement to pain. Follow up if not resolved.   - psyllium (METAMUCIL SMOOTH TEXTURE) 28 % packet  Dispense: 30 each; Refill: 11    Visit for screening mammogram  - MA SCREENING DIGITAL BILAT - Future  (s+30)    Encounter for immunization  - HEPATITIS B, ADULT (ENGERIX-B/RECOMBIVAX HB)    This note was generated with [employer], and has been reviewed personally for accuracy. Some pronoun/word replacements may be made unintentionally.     LIZETH Romero [facility]        Heidi Cortes is a 62 year old, presenting for the following health issues:  Follow Up        8/31/2023    10:03 AM   Additional Questions   Roomed by Haley Hoffmanhy here today for follow up on multiple issues:    #Right flank/back pain:  Previously evaluated for possible gallbladder origin, stones found, is now s/p cholecystectomy but still having symptoms  She is always constipated and wonders if this could be the cause. Bought metamucil w/ collagen in it and it helps her.    #Cysts:  Has had 3 cysts on her back recently, wonders what the cause could be   was able to pop 1, but other has not been able to express anything  Has had 1-2 excised in the past    #Weight follow up:   The phentermine is really helpful. She did not refill it right away last  "time, so she missed a couple of days and noticed she had higher cravings, more sluggish rama in AMs. She does not feel jittery. She is not having sleep troubles. She feels like it helps her have more energy.     History of Present Illness       Back Pain:  She presents for follow up of back pain. Patient's back pain is a chronic problem.  Location of back pain:  Right middle of back  Description of back pain: dull ache  Back pain spreads: nowhere    Since patient first noticed back pain, pain is: always present, but gets better and worse  Does back pain interfere with her job:  No       Hypothyroidism:     Since last visit, patient describes the following symptoms::  Constipation, Dry skin and Hair loss    Reason for visit:  Red blood count and med follow up: constant pain, Also 3 areas of concern on back: 2 went away, groin pain, birth omar on lower back. Not sleeping well. face and neck acne    She eats 2-3 servings of fruits and vegetables daily.She consumes 1 sweetened beverage(s) daily.She exercises with enough effort to increase her heart rate 9 or less minutes per day.  She exercises with enough effort to increase her heart rate 3 or less days per week.   She is taking medications regularly.         Objective    /89   Pulse 78   Temp 97.9  F (36.6  C) (Oral)   Ht 1.594 m (5' 2.75\")   Wt 88.9 kg (196 lb)   LMP 04/01/2013   SpO2 99%   BMI 35.00 kg/m    Body mass index is 35 kg/m .  Physical Exam   GENERAL: healthy, alert and no distress  SKIN: on right mid back there is an epidermal cyst approx 2cm diameter  PSYCH: mentation appears normal, affect normal/bright    "

## 2023-09-07 ENCOUNTER — ANCILLARY PROCEDURE (OUTPATIENT)
Dept: MAMMOGRAPHY | Facility: CLINIC | Age: 63
End: 2023-09-07
Attending: PHYSICIAN ASSISTANT
Payer: COMMERCIAL

## 2023-09-07 DIAGNOSIS — Z12.31 VISIT FOR SCREENING MAMMOGRAM: ICD-10-CM

## 2023-09-07 PROCEDURE — 77067 SCR MAMMO BI INCL CAD: CPT | Performed by: RADIOLOGY

## 2023-09-07 PROCEDURE — 77063 BREAST TOMOSYNTHESIS BI: CPT | Performed by: RADIOLOGY

## 2023-09-28 ENCOUNTER — MYC MEDICAL ADVICE (OUTPATIENT)
Dept: FAMILY MEDICINE | Facility: CLINIC | Age: 63
End: 2023-09-28
Payer: COMMERCIAL

## 2023-09-28 NOTE — TELEPHONE ENCOUNTER
Left voicemail to call back.  Also sent [employer] message recommending appt with Kya tomorrow.     Ekta Gomez RN, BSN, PHN  [facility]  [phone]

## 2023-09-28 NOTE — TELEPHONE ENCOUNTER
Patient thought she had seen Kya last week or the week before.  Last appointment was 4 weeks ago and recommendation was to be seen for follow-up if pain continued.  Appointment scheduled for tomorrow afternoon with Kya Herring.  Deb Sigala RN  [facility]

## 2023-09-29 ENCOUNTER — OFFICE VISIT (OUTPATIENT)
Dept: FAMILY MEDICINE | Facility: CLINIC | Age: 63
End: 2023-09-29
Payer: COMMERCIAL

## 2023-09-29 VITALS
RESPIRATION RATE: 20 BRPM | WEIGHT: 196 LBS | BODY MASS INDEX: 34.73 KG/M2 | TEMPERATURE: 97.4 F | DIASTOLIC BLOOD PRESSURE: 88 MMHG | OXYGEN SATURATION: 95 % | HEIGHT: 63 IN | HEART RATE: 113 BPM | SYSTOLIC BLOOD PRESSURE: 116 MMHG

## 2023-09-29 DIAGNOSIS — R10.9 RIGHT FLANK PAIN: Primary | ICD-10-CM

## 2023-09-29 DIAGNOSIS — R10.31 BILATERAL GROIN PAIN: ICD-10-CM

## 2023-09-29 DIAGNOSIS — L70.0 ACNE VULGARIS: ICD-10-CM

## 2023-09-29 DIAGNOSIS — G47.09 OTHER INSOMNIA: ICD-10-CM

## 2023-09-29 DIAGNOSIS — R10.32 BILATERAL GROIN PAIN: ICD-10-CM

## 2023-09-29 PROCEDURE — 99214 OFFICE O/P EST MOD 30 MIN: CPT | Performed by: PHYSICIAN ASSISTANT

## 2023-09-29 RX ORDER — TRAZODONE HYDROCHLORIDE 50 MG/1
50-100 TABLET, FILM COATED ORAL
Qty: 60 TABLET | Refills: 4 | Status: SHIPPED | OUTPATIENT
Start: 2023-09-29 | End: 2024-03-20

## 2023-09-29 RX ORDER — CLINDAMYCIN PHOSPHATE 10 MG/G
GEL TOPICAL 2 TIMES DAILY
Qty: 30 G | Refills: 3 | Status: SHIPPED | OUTPATIENT
Start: 2023-09-29 | End: 2024-02-05

## 2023-09-29 RX ORDER — MELOXICAM 7.5 MG/1
7.5 TABLET ORAL DAILY
Qty: 30 TABLET | Refills: 0 | Status: SHIPPED | OUTPATIENT
Start: 2023-09-29 | End: 2024-03-20

## 2023-09-29 NOTE — PROGRESS NOTES
Assessment & Plan     Right flank pain  Bilateral groin pain - pain previously evaluated with US that lead to cholelithiasis/cholecystectomy, however, persistent and worsening symptoms. Recommend CT to evaluate. Provided with meloxicam for analgesia, based on history suspicious for possible msk origin. Await results for next steps.   - CT Abdomen Pelvis w/o Contrast  - meloxicam (MOBIC) 7.5 MG tablet  Dispense: 30 tablet; Refill: 0    Acne vulgaris - will provide clindamycin for topical use  - clindamycin (CLINDAMAX) 1 % external gel  Dispense: 30 g; Refill: 3    Other insomnia - not sleeping as well due to pain and possibly needing new CPAP machine, will provide trazodone for PRN use.   - traZODone (DESYREL) 50 MG tablet  Dispense: 60 tablet; Refill: 4    Kya Herring PA-C  [facility]      Heidi Cortes is a 62 year old, presenting for the following health issues:  Follow Up (Back right side)        9/29/2023     1:14 PM   Additional Questions   Roomed by arnold   Accompanied by self     Ongoing right flank pain  We evaluated this and thought that pain was possibly due to cholelithiasis - is now s/p cholecystectomy without any improvement of pain  Notices when arms in the air (ex washing hair in the shower) and lowers arm will get intolerable pain in right flank and subsides after a few minutes  This has gotten more and more bothersome, limiting her activity  Wonders whether this could be a hernia  Does bother her at nighttime causing sleep difficulty    Groin pain, now severe  Ongoing for over 1 month  Pain located in inner thighs up to bilateral inguinal folds    She wonders about a hernia as cause for either/both of the etiologies of pain    She also notes ongoing whiteheads/acne that aren't improved with metronidazole    History of Present Illness       Back Pain:  She presents for follow up of back pain. Patient's back pain is a chronic problem.  Location of back pain:  Right  "middle of back  Description of back pain: fullness and stabbing  Back pain spreads: right thigh and left thigh    Since patient first noticed back pain, pain is: gradually worsening  Does back pain interfere with her job:  Not applicable       Reason for visit:  Continued pain should have moved forward for exploration    She eats 2-3 servings of fruits and vegetables daily.She consumes 1 sweetened beverage(s) daily.She exercises with enough effort to increase her heart rate 9 or less minutes per day.  She exercises with enough effort to increase her heart rate 3 or less days per week.   She is taking medications regularly.         Objective    /88 (BP Location: Left arm, Patient Position: Sitting, Cuff Size: Adult Regular)   Pulse 113   Temp 97.4  F (36.3  C) (Temporal)   Resp 20   Ht 1.61 m (5' 3.39\")   Wt 88.9 kg (196 lb)   LMP 04/01/2013   SpO2 95%   BMI 34.30 kg/m    Body mass index is 34.3 kg/m .  Physical Exam   GENERAL: healthy, alert and no distress  ABDOMEN: soft, nontender, no hepatosplenomegaly, no masses and bowel sounds normal. No hernias   (female): no hernia, no tenderness or other abnormality  BACK: no CVA tenderness, no paralumbar tenderness  "

## 2023-10-03 ENCOUNTER — HOSPITAL ENCOUNTER (OUTPATIENT)
Dept: CT IMAGING | Facility: CLINIC | Age: 63
Discharge: HOME | End: 2023-10-03
Attending: PHYSICIAN ASSISTANT | Admitting: PHYSICIAN ASSISTANT
Payer: COMMERCIAL

## 2023-10-03 DIAGNOSIS — R10.9 RIGHT FLANK PAIN: ICD-10-CM

## 2023-10-03 DIAGNOSIS — R10.31 BILATERAL GROIN PAIN: ICD-10-CM

## 2023-10-03 DIAGNOSIS — R10.32 BILATERAL GROIN PAIN: ICD-10-CM

## 2023-10-03 PROCEDURE — 74176 CT ABD & PELVIS W/O CONTRAST: CPT

## 2023-10-04 ENCOUNTER — MYC MEDICAL ADVICE (OUTPATIENT)
Dept: FAMILY MEDICINE | Facility: CLINIC | Age: 63
End: 2023-10-04
Payer: COMMERCIAL

## 2023-10-04 DIAGNOSIS — G89.29 CHRONIC RIGHT-SIDED THORACIC BACK PAIN: Primary | ICD-10-CM

## 2023-10-04 DIAGNOSIS — M54.6 CHRONIC RIGHT-SIDED THORACIC BACK PAIN: Primary | ICD-10-CM

## 2023-10-05 NOTE — TELEPHONE ENCOUNTER
Sophia Boland would like a referral  to PT. Order pended for your review.    Also states Meloxicam has improved groin pain, but not side/back pain. Thank you.    Ekta Gomez RN, BSN, PHN  [facility]  [phone]

## 2023-10-15 DIAGNOSIS — R10.31 BILATERAL GROIN PAIN: ICD-10-CM

## 2023-10-15 DIAGNOSIS — R10.32 BILATERAL GROIN PAIN: ICD-10-CM

## 2023-10-15 DIAGNOSIS — R10.9 RIGHT FLANK PAIN: ICD-10-CM

## 2023-10-21 DIAGNOSIS — G47.09 OTHER INSOMNIA: ICD-10-CM

## 2023-10-23 ENCOUNTER — THERAPY VISIT (OUTPATIENT)
Dept: PHYSICAL THERAPY | Facility: CLINIC | Age: 63
End: 2023-10-23
Attending: PHYSICIAN ASSISTANT
Payer: COMMERCIAL

## 2023-10-23 DIAGNOSIS — G89.29 CHRONIC RIGHT-SIDED THORACIC BACK PAIN: ICD-10-CM

## 2023-10-23 DIAGNOSIS — M54.6 CHRONIC RIGHT-SIDED THORACIC BACK PAIN: ICD-10-CM

## 2023-10-23 PROCEDURE — 97110 THERAPEUTIC EXERCISES: CPT | Mod: GP | Performed by: PHYSICAL THERAPIST

## 2023-10-23 PROCEDURE — 97161 PT EVAL LOW COMPLEX 20 MIN: CPT | Mod: GP | Performed by: PHYSICAL THERAPIST

## 2023-10-23 NOTE — PROGRESS NOTES
PHYSICAL THERAPY EVALUATION  Type of Visit: Evaluation    See electronic medical record for Abuse and Falls Screening details.    Subjective Pain started about a year ago, newer thing that has developed was her groin pain that she feels it, L foot did have a broken metatarsal with a indra/screw and that always hurts for her too. She is losing weight, it was a long quiet winter, she does get very active in the summer with carrying heavy pots. 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 Patient feeling pretty limber but she feels like she needs to stretch more, she enjoys golfing, walking and some biking.      Presenting condition or subjective complaint: mid rigth back, flank ongoing pain  Date of onset: 10/05/23 (PT order)    Relevant medical history: Bladder or bowel problems; Dizziness; Menopause; Migraines or headaches; Overweight; Sleep disorder like apnea; Thyroid problems; Vision problems   Past Medical History:   Diagnosis Date    Allergic rhinitis, cause unspecified     Allergic rhinitis    Carcinoma in situ of skin of other and unspecified parts of face     squamous cell skin cancer upper lip    Cholecystitis 3/27/2023    Added automatically from request for surgery [id number]    Depressive disorder unknown    anxiety non clinical    Depressive disorder, not elsewhere classified     Diaphragmatic hernia without mention of obstruction or gangrene     hiatal hernia    Essential hypertension, benign     Helicobacter pylori (H. pylori)     TREATED    Hypersomnia with sleep apnea, unspecified     CPAP    Mixed hyperlipidemia     Other chest pain 08/01/2004    non-cardiac, cardiology,  nl stress test    Other forms of migraine, without mention of intractable migraine without mention of status migrainosus     intermittent, formerly treated with Zomig    Squamous cell carcinoma     Mohs surgery    Syncope ER 12/09    NL echo, head CT    Temporomandibular disorder     Unspecified  hypothyroidism      Dates & types of surgery:    Past Surgical History:   Procedure Laterality Date    BIOPSY OF SKIN LESION      BREAST SURGERY  2009 nishant    breast reduction    COLONOSCOPY  ongoing    DAVINCI LAPAROSCOPIC CHOLECYSTECTOMY WITHOUT GRAMS N/A 4/4/2023    Procedure: CHOLECYSTECTOMY, ROBOT-ASSISTED, LAPAROSCOPIC, WITHOUT CHOLANGIOGRAM;  Surgeon: Jey Rojas MD;  Location: [facility] OR    ESOPHAGOSCOPY, GASTROSCOPY, DUODENOSCOPY (EGD), COMBINED  05/23/2011    Procedure:COMBINED ESOPHAGOSCOPY, GASTROSCOPY, DUODENOSCOPY (EGD), BIOPSY SINGLE OR MULTIPLE; Surgeon:JOSE L FOOTE; Location:[facility]    HC CT HEAD WO CONTRAST      HC ESOPHAGOSCOPY, DIAGNOSTIC      Hiatal Hernia    HC HYSTEROSCOPY, SURGICAL; W/ ENDOMETRIAL ABLATION, ANY METHOD N/A     HC REMOVAL OF TONSILS,<10 Y/O      Tonsils <11y.o.    HYSTEROSCOPY  05/01/2009    with endometrial ablation    MOHS MICROGRAPHIC PROCEDURE      PELVIS LAPAROSCOPY,DX  01/01/1996    Laparoscopy, benign fatty tumor LLQ    SURGICAL HISTORY OF -   11/01/2005    [facility] Blepharoplasty    SURGICAL HISTORY OF -   09/01/2006    [facility] Breast Reduction    ZZC ECHO HEART XTHORACIC,COMPLETE, W/O DOPPLER  12/01/2009    EF 60%     Prior diagnostic imaging/testing results: CT scan ultrasound   Prior therapy history for the same diagnosis, illness or injury: No      Living Environment  Social support: With a significant other or spouse   Type of home: House   Stairs to enter the home: Yes 6 Is there a railing: Yes   Ramp: No   Stairs inside the home: Yes 15 Is there a railing: Yes   Help at home: None  Equipment owned:       Employment: Yes    Hobbies/Interests: gardening, golf, walk, bike    Patient goals for therapy: get up from sitting smoothly and laying position without P, struggle to work    Pain assessment: Pain present  See objective evaluation for additional pain details     Objective   LUMBAR SPINE EVALUATION    PAIN: Pain Location: R sided thoracic and lumbar pain,  bilateral hip pains  Pain Quality: dull, achy, and sometimes sharp shooting with specific movements  Pain is Exacerbated By: when her hands are up above her head and added rotation, washing her hair in she shower, heavy lifting is the worst  Pain is Relieved By: inflammatory did decrease the groin pain but didn't change the back pain  Pain Progression: has gotten a little worse    ROM:   (Degrees) Left AROM Left PROM  Right AROM Right PROM   Hip Flexion       Hip Extension       Hip Abduction       Hip Adduction       Hip Internal Rotation       Hip External Rotation       Knee Flexion       Knee Extension       Lumbar Side glide Inc P on R  Inc P less than L   Lumbar Flexion Hands to floor    Lumbar Extension WFL     MYOTOMES: WFL no inc of symptoms    FLEXIBILITY: decreased of hamstrings, HF, hip IR  PALPATION: TTP at R Serratus, distal and proximal QL    LUMBAR/HIP Special Tests:    Left Right   ANJELICA - -   FADIR/Labrum/JOVANI - -   Femoral Nerve     Fly's     Piriformis     Quadrant Testing     SLR - -   Slump - -   Stork with Extension     Bernardo            Assessment & Plan   CLINICAL IMPRESSIONS  Medical Diagnosis: Chronic Thoracic back pain    Treatment Diagnosis: R sided thoracolumbar pain, Serratus anterior pain, B hip/groin pain   Impression/Assessment: Patient is a 62 year old female with R sided low back and bilateral hip complaints.  The following significant findings have been identified: Pain, Decreased ROM/flexibility, Decreased joint mobility, Decreased strength, and Decreased activity tolerance. These impairments interfere with their ability to perform self care tasks, recreational activities, household chores, household mobility, and community mobility as compared to previous level of function.     Clinical Decision Making (Complexity):  Clinical Presentation: Stable/Uncomplicated  Clinical Presentation Rationale: based on medical and personal factors listed in PT evaluation  Clinical Decision  "Making (Complexity): Low complexity    PLAN OF CARE  Treatment Interventions:  Modalities: Cryotherapy, E-stim, Hot Pack, Ultrasound  Interventions: Manual Therapy, Neuromuscular Re-education, Therapeutic Activity, Therapeutic Exercise, Self-Care/Home Management    Long Term Goals     PT Goal 1  Goal Identifier: Showering  Goal Description: Patient will be able to shower after washing her hair and rotating without increased P to improve household performance.  Target Date: 01/20/24  PT Goal 2  Goal Identifier: Getting up from sitting  Goal Description: Patient will be able to perform 10 sit to stands \"smoothly\" without increased hip/groin pain in order to improve mobility and functional household work.  Target Date: 12/07/23      Frequency of Treatment: 1x/week  Duration of Treatment: 10 weeks    Education Assessment:        Risks and benefits of evaluation/treatment have been explained.   Patient/Family/caregiver agrees with Plan of Care.     Evaluation Time:     PT Eval, Low Complexity Minutes (44133): 15     Signing Clinician: ASHLEY LONGORIA PT          "

## 2023-10-31 ENCOUNTER — THERAPY VISIT (OUTPATIENT)
Dept: PHYSICAL THERAPY | Facility: CLINIC | Age: 63
End: 2023-10-31
Attending: PHYSICIAN ASSISTANT
Payer: COMMERCIAL

## 2023-10-31 DIAGNOSIS — M54.6 CHRONIC RIGHT-SIDED THORACIC BACK PAIN: Primary | ICD-10-CM

## 2023-10-31 DIAGNOSIS — G89.29 CHRONIC RIGHT-SIDED THORACIC BACK PAIN: Primary | ICD-10-CM

## 2023-10-31 PROCEDURE — 97110 THERAPEUTIC EXERCISES: CPT | Mod: GP | Performed by: PHYSICAL THERAPIST

## 2023-10-31 PROCEDURE — 97140 MANUAL THERAPY 1/> REGIONS: CPT | Mod: GP | Performed by: PHYSICAL THERAPIST

## 2024-01-13 RX ORDER — MELOXICAM 7.5 MG/1
7.5 TABLET ORAL DAILY
Qty: 30 TABLET | Refills: 0 | OUTPATIENT
Start: 2024-01-13

## 2024-01-19 RX ORDER — TRAZODONE HYDROCHLORIDE 50 MG/1
TABLET, FILM COATED ORAL
Qty: 180 TABLET | Refills: 2 | OUTPATIENT
Start: 2024-01-19

## 2024-02-05 DIAGNOSIS — L70.0 ACNE VULGARIS: ICD-10-CM

## 2024-02-05 RX ORDER — CLINDAMYCIN PHOSPHATE 10 MG/G
GEL TOPICAL 2 TIMES DAILY
Qty: 30 G | Refills: 0 | Status: SHIPPED | OUTPATIENT
Start: 2024-02-05 | End: 2024-03-05

## 2024-03-05 DIAGNOSIS — L70.0 ACNE VULGARIS: ICD-10-CM

## 2024-03-05 RX ORDER — CLINDAMYCIN PHOSPHATE 10 MG/G
GEL TOPICAL 2 TIMES DAILY
Qty: 30 G | Refills: 1 | Status: SHIPPED | OUTPATIENT
Start: 2024-03-05 | End: 2024-05-06

## 2024-03-19 ENCOUNTER — NURSE TRIAGE (OUTPATIENT)
Dept: NURSING | Facility: CLINIC | Age: 64
End: 2024-03-19
Payer: COMMERCIAL

## 2024-03-19 NOTE — TELEPHONE ENCOUNTER
Nurse Triage SBAR    Is this a 2nd Level Triage? NO    Situation: productive cough    Background: Patient complains of chest congestion and a productive cough and lightheadedness. Patient has had this congestion approximately one week and states that her chest feels tight. Patient has taken three covid tests all negative. Patient has a hx of sleep apnea.  Patient is currently afebrile    Assessment: productive cough with chest tightness    Protocol Recommended Disposition:   See HCP Within 4 Hours (Or PCP Triage)    Recommendation: Patient verbalized understanding of care advice.       Routed to provider    Does the patient meet one of the following criteria for ADS visit consideration? 16+ years old, with an [facility] PCP     TIP  Providers, please consider if this condition is appropriate for management at one of our Acute and Diagnostic Services sites.     If patient is a good candidate, please use dotphrase <dot>triageresponse and select Refer to ADS to document.      Reason for Disposition   [1] MILD difficulty breathing (e.g., minimal/no SOB at rest, SOB with walking, pulse <100) AND [2] still present when not coughing    Additional Information   Negative: SEVERE difficulty breathing (e.g., struggling for each breath, speaks in single words)   Negative: Bluish (or gray) lips or face now   Negative: [1] Difficulty breathing AND [2] exposure to flames, smoke, or fumes   Negative: [1] Stridor AND [2] difficulty breathing   Negative: Sounds like a life-threatening emergency to the triager   Negative: [1] MODERATE difficulty breathing (e.g., speaks in phrases, SOB even at rest, pulse 100-120) AND [2] still present when not coughing   Negative: Chest pain  (Exception: MILD central chest pain, present only when coughing.)   Negative: Patient sounds very sick or weak to the triager    Protocols used: Cough - Acute [id number]

## 2024-03-20 ENCOUNTER — OFFICE VISIT (OUTPATIENT)
Dept: URGENT CARE | Facility: URGENT CARE | Age: 64
End: 2024-03-20
Payer: COMMERCIAL

## 2024-03-20 VITALS
OXYGEN SATURATION: 99 % | SYSTOLIC BLOOD PRESSURE: 145 MMHG | HEART RATE: 86 BPM | BODY MASS INDEX: 34.12 KG/M2 | RESPIRATION RATE: 18 BRPM | TEMPERATURE: 98 F | DIASTOLIC BLOOD PRESSURE: 89 MMHG | WEIGHT: 195 LBS

## 2024-03-20 DIAGNOSIS — J06.9 VIRAL URI WITH COUGH: Primary | ICD-10-CM

## 2024-03-20 DIAGNOSIS — I10 ESSENTIAL HYPERTENSION: ICD-10-CM

## 2024-03-20 PROCEDURE — 99214 OFFICE O/P EST MOD 30 MIN: CPT | Performed by: NURSE PRACTITIONER

## 2024-03-20 RX ORDER — BENZONATATE 200 MG/1
200 CAPSULE ORAL 3 TIMES DAILY PRN
Qty: 30 CAPSULE | Refills: 0 | Status: SHIPPED | OUTPATIENT
Start: 2024-03-20 | End: 2024-03-30

## 2024-03-20 NOTE — PROGRESS NOTES
ICD-10-CM    1. Viral URI with cough  J06.9 benzonatate (TESSALON) 200 MG capsule      2. Essential hypertension  I10         Lung sounds are completely clear and oxygen saturations are normal, no signs of respiratory distress in clinic.  Do not believe she has a pneumonia.  Encourage patient to use benzonatate cough medicine along with Delsym for cough suppression.  Rest.  Fluids.  Mucinex as desired during the day.  Tylenol or ibuprofen as needed for fever or pain.  Recheck in 10 days if symptoms have not improved, sooner if they worsen.      Blood pressure is elevated in clinic today.  She does not take hypertension medications at this time.  She does not recall taking this in the past but it is documented in her chart.  Advised her to check her blood pressure twice a day and keep a log of this.  If it remains elevated to discuss with primary care at next visit.  Avoid decongestants due to high blood pressure.    Red flag warning signs and when to go to the emergency room discussed.  Reviewed potential adverse reactions to medications.    Labs:   SUBJECTIVE:   Sherrell Kothari is a 63 year old female presenting with a chief complaint of   Chief Complaint   Patient presents with    Urgent Care    Cold Symptoms     X1 week.     Cough     Coughing, c/o pneumonia.     Dizziness   Talking Ibuprofen and Tylenol, terrible body aches, chills                         Review of systems is negative except for as noted in the HPI.        OBJECTIVE  BP (!) 145/89   Pulse 86   Temp 98  F (36.7  C) (Temporal)   Resp 18   Wt 88.5 kg (195 lb)   LMP 04/01/2013   SpO2 99%   BMI 34.12 kg/m        GENERAL: Alert, mild distress  SKIN: skin is clear, no rash or abnormal pigmentation  HEAD: The head is normocephalic.   EYES: The eyes are normal. The conjunctivae and cornea normal.   NECK: The neck is supple and thyroid is normal, no masses; LYMPH NODES: No adenopathy  HENT: Bilateral tympanic membranes and canals are normal,  mild erythema of pharynx, nasal passages show clear rhinorrhea  LUNGS: The lung fields are clear to auscultation, no rales, rhonchi, wheezing or retractions  CV: Rhythm is regular. S1 and S2 are normal. No murmurs.  EXTREMITIES: Symmetric extremities no deformities    MANDA Velazquez, CNP  [facility] Provider    The use of Dragon/[employer] dictation services may have been used to construct the content in this note; any grammatical or spelling errors are non-intentional. Please contact the author of this note directly if you are in need of any clarification.

## 2024-03-20 NOTE — TELEPHONE ENCOUNTER
Was addressed in 3/20/24 mychart encounter where patient was addressed and advised to go to urgent care.    Denisse Mcleod RN  [facility]

## 2024-03-20 NOTE — PATIENT INSTRUCTIONS
"Avoid combination cough and cold medications.    May take Delsym cough syrup as needed for cough suppression. May take this with Benzonatate.    Tylenol as needed.    Drink extra water.    Avoid decongestants with your high blood pressure.    Please purchase a blood pressure monitoring cuff that can go on the wrist or arm.  If you have larger arms please use a wrist monitor.  Check your blood pressure twice a day for the next 2 weeks and keep a log of this.  If the top number is averaging over 140 and the bottom number is averaging greater than 90 please make appointment to see your primary care provider to discuss possible treatment.    High Blood Pressure (Essential Hypertension)   What is essential hypertension?   Hypertension is the term for blood pressure that is consistently higher than normal. Hypertension is called essential or primary when no cause for the high blood pressure can be found. (When the cause of hypertension is known, such as kidney disease and tumors, it is called secondary hypertension.) About 95% of all people with high blood pressure have essential hypertension.   Normal blood pressure ranges up to 120/80 (\"120 over 80\") but blood pressure can rise and fall with exercise, rest, or emotions. The pressures are measured in millimeters of mercury. The upper number (120) is the pressure when the heart pushes blood out to the rest of the body (systolic pressure). The bottom number (80) is the pressure when the heart rests between beats (diastolic pressure).     Healthy blood pressure is less than 120/80.   Pre-high blood pressure (prehypertension) is from 120/80 to 139/90.   Stage I high blood pressure ranges from 140/90 to 159/99.   Stage II high blood pressure is over 160/100.     If repeated checks of your blood pressure show that it is higher than 140/90, you have hypertension.   Why is high blood pressure a problem?   When your blood pressure is high, your heart has to work harder just to " pump a normal amount of blood through your body. The higher pressure in your arteries may cause them to weaken and bleed, resulting in a stroke. Over time, blood vessels may become hardened. This often occurs as people age. High blood pressure speeds this process. Blood vessel damage is bad because hardened or narrowed arteries may be unable to supply the amount of blood the body's organs need. The higher artery pressure may lead to atherosclerosis, in which deposits of cholesterol, fatty substances, and blood cells clog up an artery. Atherosclerosis is the leading cause of heart attacks. It can also cause strokes.   The added workload on the heart causes thickening of the heart muscle. Over time, the thickening damages the heart muscle so that it can no longer pump normally. This can lead to a disease called heart failure. Your kidneys or eyes may also be damaged. The longer you have high blood pressure and the higher it is, the more likely it is you will develop problems.   How does it occur?  There are no clear causes of essential hypertension. However, many different factors can increase blood pressure, such:  being overweight   smoking   eating a diet high in salt   drinking a lot of alcohol.   Other important factors include:   Race.  Americans are more likely to develop high blood pressure.   Gender. Males have a greater chance of developing high blood pressure than women until age 55. However, after the age of 75, women are more likely to develop high blood pressure than men.   Heredity. If your parents had high blood pressure, you are more at risk.   Age. The older you get, the more likely you are to develop high blood pressure.   Some medicines increase blood pressure. Stress and drinking caffeine can make blood pressure go up for a while, but the long-term effects aren't yet clear.   What are the symptoms?   One of the sneaky things about high blood pressure is that you can have it for a long time  without symptoms. That's why it is important for you have your blood pressure checked at least once a year.   If you do have symptoms, they may be:   Headaches, getting tired easily, dizziness, nosebleeds, chest pain, shortness of breath.   Although it happens rarely, the first symptom may be a stroke.   How is it diagnosed?   Because it is such a common problem, blood pressure is checked at most health care visits. High blood pressure is usually discovered during one of these visits. If your blood pressure is high, you will be asked to return for follow-up checks. If your pressure stays high for 3 visits, you probably have hypertension.   Your health care provider will ask about your life situation, what you eat and drink, and if high blood pressure runs in your family. You may have urine and blood tests. Your provider may order a chest x-ray and an electrocardiogram (ECG). You may be asked to use a portable blood-pressure measuring device, which will take your pressure at different times during day and night. All of this testing is done to look for a possible cause of your high blood pressure.   How is it treated?   If your blood pressure is above normal (prehypertension), you may be able to bring it down to a normal level without medicine. Weight loss, changes in your diet, and exercise may be the only treatment you need. If you also have diabetes, you may need additional treatment.   If these lifestyle changes do not lower your blood pressure enough, your health care provider may prescribe medicine. Some of the types of medicines that can help are diuretics, beta blockers, ACE inhibitors, calcium channel blockers, and vasodilators. These medicines work in different ways. Many people need to take two or more medicines to bring their blood pressure down to a healthy level.     When you start taking medicine, it is important to:   Take the medicine regularly, exactly as prescribed.   Tell your health care provider  about any side effects right away.   Have regular follow-up visits with your health care provider.   It may not be possible to know at first which drug or mix of drugs will work best for you. It may take several weeks or months to find the best treatment for you.   How long will the effects last?   You may need treatment for high blood pressure for the rest of your life. However, proper treatment can control your blood pressure and help prevent or delay problems. If you already have some complications, lowering your blood pressure may make their effects less severe.   How can I take care of myself?   Your treatment will be much more effective if you follow these guidelines:   Always follow your health care provider's instructions for taking medicines. Don't take less medicine or stop taking medicine without talking to your provider first. It can be dangerous to suddenly stop taking blood pressure medicine. Also, do not increase your dosage of any medicine without first talking with your provider.   Check your blood pressure (or have it checked) as often as your health care provider advises. Keep a chart of the readings.   Do not smoke.   Follow the DASH diet. This diet is low in fat, cholesterol, red meat, and sweets. It emphasizes fruits, vegetables, and low-fat dairy foods. The DASH diet also includes whole-grain products, fish, poultry, and nuts.   Use less salt. Check the levels of sodium listed on food labels. Avoid canned and prepared foods unless the label says no salt is added.   Get regular exercise, according to your health care provider's advice. For example, you might walk, bike, or swim at least 30 minutes 3 to 5 times a week.   Limit the amount of alcohol you drink. If you are a man, drink no more than two 1-ounce drinks of hard liquor, two beers, or two 6-ounce glasses of wine a day. Women should have no more than 1 ounce of liquor, one beer, or one glass of wine a day.   Limit the amount of caffeine  you drink.   Try to reduce the stress in your life or learn how to deal better with situations that make you feel anxious.   Ask your health care provider or pharmacist for information about the drugs you are taking.   Lose weight if you need to.   Tell your health care provider about any side effects you have from your medicines.   Developed by Mina Huntley MD; Shayna Pinzon RN, [place]; and [employer].                   Last modified: 2004-05-24      This content is reviewed periodically and is subject to change as new health information becomes available. The information is intended to inform and educate and is not a replacement for medical evaluation, advice, diagnosis or treatment by a healthcare professional.   Copyright   2004 [employer] and/or one of its subsidiaries. All Rights Reserved.

## 2024-04-06 ENCOUNTER — HEALTH MAINTENANCE LETTER (OUTPATIENT)
Age: 64
End: 2024-04-06

## 2024-04-18 NOTE — PROGRESS NOTES
[facility]    Outpatient Physical Therapy Discharge Note  Patient: Sherrell Kothari  : 1960    Patient was seen for 2 visits for thoracic pain from 10/23/23 to 10/31/23 with no follow up appointments.      Plan:  Discharge from therapy.     Reason for Discharge: No further expectation of progress.  Patient chooses to discontinue therapy.  Patient has failed to schedule further appointments.    Discharge Plan: Patient to continue home program.     If patient would like to return to therapy, they will need a new PT order from referring provider.    Judy Hernandez, PT   2024

## 2024-05-06 DIAGNOSIS — N95.2 VAGINAL ATROPHY: ICD-10-CM

## 2024-05-06 DIAGNOSIS — L70.0 ACNE VULGARIS: ICD-10-CM

## 2024-05-06 RX ORDER — CLINDAMYCIN PHOSPHATE 10 MG/G
GEL TOPICAL 2 TIMES DAILY
Qty: 30 G | Refills: 1 | Status: SHIPPED | OUTPATIENT
Start: 2024-05-06 | End: 2024-06-28

## 2024-05-07 RX ORDER — ESTRADIOL 0.1 MG/G
CREAM VAGINAL
Qty: 42.5 G | Refills: 3 | Status: SHIPPED | OUTPATIENT
Start: 2024-05-07

## 2024-05-13 SDOH — HEALTH STABILITY: PHYSICAL HEALTH: ON AVERAGE, HOW MANY DAYS PER WEEK DO YOU ENGAGE IN MODERATE TO STRENUOUS EXERCISE (LIKE A BRISK WALK)?: 4 DAYS

## 2024-05-13 SDOH — HEALTH STABILITY: PHYSICAL HEALTH: ON AVERAGE, HOW MANY MINUTES DO YOU ENGAGE IN EXERCISE AT THIS LEVEL?: 30 MIN

## 2024-05-13 ASSESSMENT — SOCIAL DETERMINANTS OF HEALTH (SDOH): HOW OFTEN DO YOU GET TOGETHER WITH FRIENDS OR RELATIVES?: MORE THAN THREE TIMES A WEEK

## 2024-05-16 ENCOUNTER — OFFICE VISIT (OUTPATIENT)
Dept: FAMILY MEDICINE | Facility: CLINIC | Age: 64
End: 2024-05-16
Payer: COMMERCIAL

## 2024-05-16 VITALS
RESPIRATION RATE: 15 BRPM | SYSTOLIC BLOOD PRESSURE: 160 MMHG | DIASTOLIC BLOOD PRESSURE: 101 MMHG | HEART RATE: 102 BPM | OXYGEN SATURATION: 100 % | BODY MASS INDEX: 38.62 KG/M2 | TEMPERATURE: 97.9 F | HEIGHT: 63 IN | WEIGHT: 218 LBS

## 2024-05-16 DIAGNOSIS — I10 PRIMARY HYPERTENSION: ICD-10-CM

## 2024-05-16 DIAGNOSIS — F43.23 ADJUSTMENT DISORDER WITH MIXED ANXIETY AND DEPRESSED MOOD: ICD-10-CM

## 2024-05-16 DIAGNOSIS — E03.4 HYPOTHYROIDISM DUE TO ACQUIRED ATROPHY OF THYROID: ICD-10-CM

## 2024-05-16 DIAGNOSIS — L98.9 CHANGING SKIN LESION: ICD-10-CM

## 2024-05-16 DIAGNOSIS — Z00.00 ROUTINE GENERAL MEDICAL EXAMINATION AT A HEALTH CARE FACILITY: Primary | ICD-10-CM

## 2024-05-16 DIAGNOSIS — R73.03 PREDIABETES: ICD-10-CM

## 2024-05-16 DIAGNOSIS — E66.01 MORBID OBESITY (H): ICD-10-CM

## 2024-05-16 LAB
ERYTHROCYTE [DISTWIDTH] IN BLOOD BY AUTOMATED COUNT: 16.6 % (ref 10–15)
HBA1C MFR BLD: 5.9 % (ref 0–5.6)
HCT VFR BLD AUTO: 36 % (ref 35–47)
HGB BLD-MCNC: 10.6 G/DL (ref 11.7–15.7)
MCH RBC QN AUTO: 22.6 PG (ref 26.5–33)
MCHC RBC AUTO-ENTMCNC: 29.4 G/DL (ref 31.5–36.5)
MCV RBC AUTO: 77 FL (ref 78–100)
PLATELET # BLD AUTO: 445 10E3/UL (ref 150–450)
RBC # BLD AUTO: 4.68 10E6/UL (ref 3.8–5.2)
WBC # BLD AUTO: 10.2 10E3/UL (ref 4–11)

## 2024-05-16 PROCEDURE — 85027 COMPLETE CBC AUTOMATED: CPT | Performed by: PHYSICIAN ASSISTANT

## 2024-05-16 PROCEDURE — 83036 HEMOGLOBIN GLYCOSYLATED A1C: CPT | Performed by: PHYSICIAN ASSISTANT

## 2024-05-16 PROCEDURE — 11301 SHAVE SKIN LESION 0.6-1.0 CM: CPT | Performed by: PHYSICIAN ASSISTANT

## 2024-05-16 PROCEDURE — 99396 PREV VISIT EST AGE 40-64: CPT | Mod: 25 | Performed by: PHYSICIAN ASSISTANT

## 2024-05-16 PROCEDURE — 36415 COLL VENOUS BLD VENIPUNCTURE: CPT | Performed by: PHYSICIAN ASSISTANT

## 2024-05-16 PROCEDURE — 80048 BASIC METABOLIC PNL TOTAL CA: CPT | Performed by: PHYSICIAN ASSISTANT

## 2024-05-16 PROCEDURE — 88305 TISSUE EXAM BY PATHOLOGIST: CPT | Performed by: PATHOLOGY

## 2024-05-16 PROCEDURE — 99214 OFFICE O/P EST MOD 30 MIN: CPT | Mod: 25 | Performed by: PHYSICIAN ASSISTANT

## 2024-05-16 PROCEDURE — 84443 ASSAY THYROID STIM HORMONE: CPT | Performed by: PHYSICIAN ASSISTANT

## 2024-05-16 PROCEDURE — 80061 LIPID PANEL: CPT | Performed by: PHYSICIAN ASSISTANT

## 2024-05-16 RX ORDER — PHENTERMINE HYDROCHLORIDE 37.5 MG/1
37.5 TABLET ORAL
Qty: 30 TABLET | Refills: 5 | Status: SHIPPED | OUTPATIENT
Start: 2024-05-16

## 2024-05-16 RX ORDER — LEVOTHYROXINE SODIUM 88 UG/1
88 TABLET ORAL DAILY
Qty: 90 TABLET | Refills: 3 | Status: SHIPPED | OUTPATIENT
Start: 2024-05-16

## 2024-05-16 RX ORDER — LISINOPRIL 10 MG/1
10 TABLET ORAL DAILY
Qty: 90 TABLET | Refills: 1 | Status: SHIPPED | OUTPATIENT
Start: 2024-05-16

## 2024-05-16 ASSESSMENT — PAIN SCALES - GENERAL: PAINLEVEL: NO PAIN (0)

## 2024-05-16 NOTE — PROGRESS NOTES
"Preventive Care Visit  [facility]  Kya Herring PA-C, Physician Assistant - Medical  May 16, 2024      Assessment & Plan     Routine general medical examination at a health care facility  - CBC with platelets  - Basic metabolic panel  - Lipid panel reflex to direct LDL Non-fasting  - TSH with free T4 reflex  - CBC with platelets  - Basic metabolic panel  - Lipid panel reflex to direct LDL Non-fasting  - TSH with free T4 reflex    Hypothyroidism due to acquired atrophy of thyroid - monitoring due today, refills sent  - levothyroxine (SYNTHROID/LEVOTHROID) 88 MCG tablet  Dispense: 90 tablet; Refill: 3    Morbid obesity (H) - OK to restart phentermine daily, follow up in 3 months  - phentermine (ADIPEX-P) 37.5 MG tablet  Dispense: 30 tablet; Refill: 5    Adjustment disorder with mixed anxiety and depressed mood - letter provided, follow up sooner if not improving as expected2    Primary hypertension - recommend start lisinopril 10mg daily, will follow at next visit  - lisinopril (ZESTRIL) 10 MG tablet  Dispense: 90 tablet; Refill: 1    Changing skin lesion - see procedure note below  - Surgical Pathology Exam    Prediabetes  - Hemoglobin A1c  - Hemoglobin A1c          BMI  Estimated body mass index is 38.62 kg/m  as calculated from the following:    Height as of this encounter: 1.6 m (5' 3\").    Weight as of this encounter: 98.9 kg (218 lb).       Counseling  Appropriate preventive services were discussed with this patient, including applicable screening as appropriate for fall prevention, nutrition, physical activity, Tobacco-use cessation, weight loss and cognition.  Checklist reviewing preventive services available has been given to the patient.  Reviewed patient's diet, addressing concerns and/or questions.   She is at risk for psychosocial distress and has been provided with information to reduce risk.           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    HPI      5/13/2024   General Health   How would you rate your overall physical health? Good   Feel stress (tense, anxious, or unable to sleep) Only a little   (!) STRESS CONCERN      5/13/2024   Nutrition   Three or more servings of calcium each day? (!) I DON'T KNOW   Diet: Regular (no restrictions)   How many servings of fruit and vegetables per day? (!) 2-3   How many sweetened beverages each day? 0-1         5/13/2024   Exercise   Days per week of moderate/strenous exercise 4 days   Average minutes spent exercising at this level 30 min         5/13/2024   Social Factors   Frequency of gathering with friends or relatives More than three times a week   Worry food won't last until get money to buy more No   Food not last or not have enough money for food? No   Do you have housing?  Yes   Are you worried about losing your housing? No   Lack of transportation? No   Unable to get utilities (heat,electricity)? No         5/13/2024   Fall Risk   Fallen 2 or more times in the past year? No   Trouble with walking or balance? No          5/13/2024   Dental   Dentist two times every year? Yes          2024   TB Screening   Were you born outside of the [place]? No     Today's PHQ-2 Score:       2024     2:12 PM   PHQ-2 (  [employer])   Q1: Little interest or pleasure in doing things 1   Q2: Feeling down, depressed or hopeless 1   PHQ-2 Score 2   Q1: Little interest or pleasure in doing things Several days   Q2: Feeling down, depressed or hopeless Several days   PHQ-2 Score 2           2024   Substance Use   Alcohol more than 3/day or more than 7/wk No   Do you use any other substances recreationally? No     Social History     Tobacco Use    Smoking status: Former     Current packs/day: 0.00     Types: Cigarettes     Start date: 1975     Quit date: 1978     Years since quittin.4    Smokeless tobacco: Never    Tobacco comments:     I was a teenager quit at age 17   Vaping Use    Vaping status: Never Used   Substance Use Topics    Alcohol use: Yes     Comment: casual. occasional cocktail after work.  Some on a week-end    Drug use: No           2023   LAST FHS-7 RESULTS   1st degree relative breast or ovarian cancer No   Any relative bilateral breast cancer No   Any male have breast cancer No   Any ONE woman have BOTH breast AND ovarian cancer No   Any woman with breast cancer before 50yrs No   2 or more relatives with breast AND/OR ovarian cancer No   2 or more relatives with breast AND/OR bowel cancer Yes        Mammogram Screening - Mammogram every 1-2 years updated in Health Maintenance based on mutual decision making        2024   STI Screening   New sexual partner(s) since last STI/HIV test? No     History of abnormal Pap smear: No - age 30- 64 PAP with HPV every 5 years recommended        Latest Ref Rng & Units 2022     9:18 AM 2015    12:00 AM 2012     1:54 PM   PAP / HPV   PAP  Negative for Intraepithelial Lesion or Malignancy (NILM)      PAP (Historical)   NIL  NIL    HPV 16 DNA Negative Negative      HPV 18 DNA Negative Negative      Other HR HPV Negative  "Negative        ASCVD Risk   The 10-year ASCVD risk score (Oscar BEST, et al., 2019) is: 8.1%    Values used to calculate the score:      Age: 63 years      Sex: Female      Is Non- : No      Diabetic: No      Tobacco smoker: No      Systolic Blood Pressure: 160 mmHg      Is BP treated: No      HDL Cholesterol: 53 mg/dL      Total Cholesterol: 215 mg/dL       Reviewed and updated as needed this visit by Provider   Tobacco  Allergies  Meds  Problems  Med Hx  Surg Hx  Fam Hx                 Objective    Exam  BP (!) 160/101 (BP Location: Right arm, Patient Position: Sitting, Cuff Size: Adult Regular)   Pulse 102   Temp 97.9  F (36.6  C) (Temporal)   Resp 15   Ht 1.6 m (5' 3\")   Wt 98.9 kg (218 lb)   LMP 04/01/2013   SpO2 100%   BMI 38.62 kg/m     Estimated body mass index is 38.62 kg/m  as calculated from the following:    Height as of this encounter: 1.6 m (5' 3\").    Weight as of this encounter: 98.9 kg (218 lb).    Physical Exam  GENERAL: alert and no distress  EYES: Eyes grossly normal to inspection, PERRL and conjunctivae and sclerae normal  HENT: ear canals and TM's normal, nose and mouth without ulcers or lesions  NECK: no adenopathy, no asymmetry, masses, or scars  RESP: lungs clear to auscultation - no rales, rhonchi or wheezes  CV: regular rate and rhythm, normal S1 S2, no S3 or S4, no murmur, click or rub, no peripheral edema  ABDOMEN: soft, nontender, no hepatosplenomegaly, no masses and bowel sounds normal  MS: no gross musculoskeletal defects noted, no edema  SKIN: on posterior left shoulder there is an irritated erythematous, rased macule approx 6mm diameter   NEURO: Normal strength and tone, mentation intact and speech normal  PSYCH: mentation appears normal, affect normal/bright    Procedure: Consent obtained following discussion of risks/benefits of shave biopsy. Lesion injected with 3cc 1% lido w/ epi. Area cleaned with betadine, draped with sterile " drape. A dermablade was used to obtain specimen, specimen placed in formalin. Hemostasis was obtained with silver nitrate. The lesion was left open. The lesion was then dressed with bacitracin and a bandaid & covered with tegaderm. The patient tolerated the procedure well.        Signed Electronically by: Kya Herring PA-C

## 2024-05-16 NOTE — LETTER
May 16, 2024      Sherrell Kothari  [address]  [facility] [address]        To Whom It May Concern:    Sherrell Kothari is an established patient of mine and was seen in clinic. She is cleared to return to work as of Monday, 6/3/24.      Sincerely,        Kya Herring PA-C

## 2024-05-16 NOTE — PATIENT INSTRUCTIONS
"After your skin biopsy/skin tag removal:    -the area is covered with a bandaid or gauze & tegaderm clear bandage  -OK to remove this dressing the next time you shower  -for the next 2 weeks, do not take baths, submerge in water (pools, hot tub, lakes, rivers), or use a sauna  -Ok to wash the area with regular soap and water - be gentle and dab the area dry  -until a scab forms, please keep the area covered with a bandaid  -once a scab forms, OK to keep open to the air (unless there is friction with your clothes) until it heals completely  -there is no stitch to be removed    Preventive Care Advice   This is general advice we often give to help people stay healthy. Your care team may have specific advice just for you. Please talk to your care team about your own preventive care needs.  Lifestyle  Exercise at least 150 minutes each week (30 minutes a day, 5 days a week).  Do muscle strengthening activities 2 days a week. These help control your weight and prevent disease.  No smoking.  Wear sunscreen to prevent skin cancer.  Have your home tested for radon every 2 to 5 years. Radon is a colorless, odorless gas that can harm your lungs. To learn more, go to www.health.Pending sale to [facility].mn.us and search for \"Radon in Homes.\"  Keep guns unloaded and locked up in a safe place like a safe or gun vault, or, use a gun lock and hide the keys. Always lock away bullets separately. To learn more, visit DocTree.mn.gov and search for \"safe gun storage.\"  Nutrition  Eat 5 or more servings of fruits and vegetables each day.  Try wheat bread, brown rice and whole grain pasta (instead of white bread, rice, and pasta).  Get enough calcium and vitamin D. Check the label on foods and aim for 100% of the RDA (recommended daily allowance).  Regular exams  Have a dental exam and cleaning every 6 months.  See your health care team every year to talk about:  Any changes in your health.  Any medicines your care team has prescribed.  Preventive care, family " planning, and ways to prevent chronic diseases.  Shots (vaccines)   HPV shots (up to age 26), if you've never had them before.  Hepatitis B shots (up to age 59), if you've never had them before.  COVID-19 shot: Get this shot when it's due.  Flu shot: Get a flu shot every year.  Tetanus shot: Get a tetanus shot every 10 years.  Pneumococcal, hepatitis A, and RSV shots: Ask your care team if you need these based on your risk.  Shingles shot (for age 50 and up).  General health tests  Diabetes screening:  Starting at age 35, Get screened for diabetes at least every 3 years.  If you are younger than age 35, ask your care team if you should be screened for diabetes.  Cholesterol test: At age 39, start having a cholesterol test every 5 years, or more often if advised.  Bone density scan (DEXA): At age 50, ask your care team if you should have this scan for osteoporosis (brittle bones).  Hepatitis C: Get tested at least once in your life.  Abdominal aortic aneurysm screening: Talk to your doctor about having this screening if you:  Have ever smoked; and  Are biologically male; and  Are between the ages of 65 and 75.  STIs (sexually transmitted infections)  Before age 24: Ask your care team if you should be screened for STIs.  After age 24: Get screened for STIs if you're at risk. You are at risk for STIs (including HIV) if:  You are sexually active with more than one person.  You don't use condoms every time.  You or a partner was diagnosed with a sexually transmitted infection.  If you are at risk for HIV, ask about PrEP medicine to prevent HIV.  Get tested for HIV at least once in your life, whether you are at risk for HIV or not.  Cancer screening tests  Cervical cancer screening: If you have a cervix, begin getting regular cervical cancer screening tests at age 21. Most people who have regular screenings with normal results can stop after age 65. Talk about this with your provider.  Breast cancer scan (mammogram): If  you've ever had breasts, begin having regular mammograms starting at age 40. This is a scan to check for breast cancer.  Colon cancer screening: It is important to start screening for colon cancer at age 45.  Have a colonoscopy test every 10 years (or more often if you're at risk) Or, ask your provider about stool tests like a FIT test every year or Cologuard test every 3 years.  To learn more about your testing options, visit: www.QikServe/321848.pdf.  For help making a decision, visit: alayna/sx82544.  Prostate cancer screening test: If you have a prostate and are age 55 to 69, ask your provider if you would benefit from a yearly prostate cancer screening test.  Lung cancer screening: If you are a current or former smoker age 50 to 80, ask your care team if ongoing lung cancer screenings are right for you.  For informational purposes only. Not to replace the advice of your health care provider. Copyright   2023 [facility]. All rights reserved. Clinically reviewed by the [facility] Transitions Program. [employer] [id number] - REV 04/24.    Learning About Stress  What is stress?     Stress is your body's response to a hard situation. Your body can have a physical, emotional, or mental response. Stress is a fact of life for most people, and it affects everyone differently. What causes stress for you may not be stressful for someone else.  A lot of things can cause stress. You may feel stress when you go on a job interview, take a test, or run a race. This kind of short-term stress is normal and even useful. It can help you if you need to work hard or react quickly. For example, stress can help you finish an important job on time.  Long-term stress is caused by ongoing stressful situations or events. Examples of long-term stress include long-term health problems, ongoing problems at work, or conflicts in your family. Long-term stress can harm your health.  How does stress affect your health?  When  you are stressed, your body responds as though you are in danger. It makes hormones that speed up your heart, make you breathe faster, and give you a burst of energy. This is called the fight-or-flight stress response. If the stress is over quickly, your body goes back to normal and no harm is done.  But if stress happens too often or lasts too long, it can have bad effects. Long-term stress can make you more likely to get sick, and it can make symptoms of some diseases worse. If you tense up when you are stressed, you may develop neck, shoulder, or low back pain. Stress is linked to high blood pressure and heart disease.  Stress also harms your emotional health. It can make you villaseñor, tense, or depressed. Your relationships may suffer, and you may not do well at work or school.  What can you do to manage stress?  You can try these things to help manage stress:   Do something active. Exercise or activity can help reduce stress. Walking is a great way to get started. Even everyday activities such as housecleaning or yard work can help.  Try yoga or satya chi. These techniques combine exercise and meditation. You may need some training at first to learn them.  Do something you enjoy. For example, listen to music or go to a movie. Practice your hobby or do volunteer work.  Meditate. This can help you relax, because you are not worrying about what happened before or what may happen in the future.  Do guided imagery. Imagine yourself in any setting that helps you feel calm. You can use online videos, books, or a teacher to guide you.  Do breathing exercises. For example:  From a standing position, bend forward from the waist with your knees slightly bent. Let your arms dangle close to the floor.  Breathe in slowly and deeply as you return to a standing position. Roll up slowly and lift your head last.  Hold your breath for just a few seconds in the standing position.  Breathe out slowly and bend forward from the  "waist.  Let your feelings out. Talk, laugh, cry, and express anger when you need to. Talking with supportive friends or family, a counselor, or a jeannie leader about your feelings is a healthy way to relieve stress. Avoid discussing your feelings with people who make you feel worse.  Write. It may help to write about things that are bothering you. This helps you find out how much stress you feel and what is causing it. When you know this, you can find better ways to cope.  What can you do to prevent stress?  You might try some of these things to help prevent stress:  Manage your time. This helps you find time to do the things you want and need to do.  Get enough sleep. Your body recovers from the stresses of the day while you are sleeping.  Get support. Your family, friends, and community can make a difference in how you experience stress.  Limit your news feed. Avoid or limit time on social media or news that may make you feel stressed.  Do something active. Exercise or activity can help reduce stress. Walking is a great way to get started.  Where can you learn more?  Go to https://www.[employer]enaissance.net/patiented  Enter N032 in the search box to learn more about \"Learning About Stress.\"  Current as of: October 24, 2023               Content Version: 14.0    5900-8339 [employer].   Care instructions adapted under license by your healthcare professional. If you have questions about a medical condition or this instruction, always ask your healthcare professional. Healthwise, [employer] disclaims any warranty or liability for your use of this information.      "

## 2024-05-17 ENCOUNTER — TELEPHONE (OUTPATIENT)
Dept: FAMILY MEDICINE | Facility: CLINIC | Age: 64
End: 2024-05-17
Payer: COMMERCIAL

## 2024-05-17 LAB
ANION GAP SERPL CALCULATED.3IONS-SCNC: 12 MMOL/L (ref 7–15)
BUN SERPL-MCNC: 20.2 MG/DL (ref 8–23)
CALCIUM SERPL-MCNC: 9.8 MG/DL (ref 8.8–10.2)
CHLORIDE SERPL-SCNC: 104 MMOL/L (ref 98–107)
CHOLEST SERPL-MCNC: 243 MG/DL
CREAT SERPL-MCNC: 0.9 MG/DL (ref 0.51–0.95)
DEPRECATED HCO3 PLAS-SCNC: 24 MMOL/L (ref 22–29)
EGFRCR SERPLBLD CKD-EPI 2021: 71 ML/MIN/1.73M2
FASTING STATUS PATIENT QL REPORTED: NO
FASTING STATUS PATIENT QL REPORTED: NO
GLUCOSE SERPL-MCNC: 87 MG/DL (ref 70–99)
HDLC SERPL-MCNC: 54 MG/DL
LDLC SERPL CALC-MCNC: 134 MG/DL
NONHDLC SERPL-MCNC: 189 MG/DL
POTASSIUM SERPL-SCNC: 4.1 MMOL/L (ref 3.4–5.3)
SODIUM SERPL-SCNC: 140 MMOL/L (ref 135–145)
TRIGL SERPL-MCNC: 275 MG/DL
TSH SERPL DL<=0.005 MIU/L-ACNC: 1.89 UIU/ML (ref 0.3–4.2)

## 2024-05-17 NOTE — TELEPHONE ENCOUNTER
Please advise. PA was denied because plan only covered the use of medication for 3 months and not longer.

## 2024-05-17 NOTE — TELEPHONE ENCOUNTER
Retail Pharmacy Prior Authorization Team   Phone: [phone]      PRIOR AUTHORIZATION DENIED    Medication: PHENTERMINE HCL 37.5 MG PO TABS  Insurance Company: [employer] [phone] Fax [phone]  Denial Date: 5/16/2024  Denial Reason(s):     Appeal Information:     Patient Notified: No

## 2024-05-21 NOTE — TELEPHONE ENCOUNTER
Please call Sophia and let her know that her insurance only covers phentermine x3 months, so she should instead now use [employer] to buy the medication - should be less than $20/month  Go to [employer] website and enter phentermine and show coupon to pharmacy on phone or can print and bring to pharmacy  Kya Herring PA-C

## 2024-05-25 ENCOUNTER — MYC MEDICAL ADVICE (OUTPATIENT)
Dept: FAMILY MEDICINE | Facility: CLINIC | Age: 64
End: 2024-05-25
Payer: COMMERCIAL

## 2024-05-25 DIAGNOSIS — E78.2 MIXED HYPERLIPIDEMIA: Primary | ICD-10-CM

## 2024-05-28 RX ORDER — ATORVASTATIN CALCIUM 40 MG/1
40 TABLET, FILM COATED ORAL DAILY
Qty: 90 TABLET | Refills: 3 | Status: SHIPPED | OUTPATIENT
Start: 2024-05-28

## 2024-05-28 NOTE — TELEPHONE ENCOUNTER
"Kay--    She mentions a \"system\".     Seems maybe she is referring to starting the statin you recommended in result note message? Pended pharmacy    RASHEED HernandezN RN  [facility]    "

## 2024-06-28 DIAGNOSIS — L70.0 ACNE VULGARIS: ICD-10-CM

## 2024-06-28 RX ORDER — CLINDAMYCIN PHOSPHATE 10 MG/G
GEL TOPICAL 2 TIMES DAILY
Qty: 30 G | Refills: 1 | Status: SHIPPED | OUTPATIENT
Start: 2024-06-28 | End: 2024-08-19

## 2024-07-08 ENCOUNTER — MYC REFILL (OUTPATIENT)
Dept: FAMILY MEDICINE | Facility: CLINIC | Age: 64
End: 2024-07-08
Payer: COMMERCIAL

## 2024-07-08 DIAGNOSIS — N95.2 VAGINAL ATROPHY: ICD-10-CM

## 2024-07-08 DIAGNOSIS — E03.4 HYPOTHYROIDISM DUE TO ACQUIRED ATROPHY OF THYROID: ICD-10-CM

## 2024-07-08 DIAGNOSIS — L70.0 ACNE VULGARIS: ICD-10-CM

## 2024-07-08 DIAGNOSIS — I10 PRIMARY HYPERTENSION: ICD-10-CM

## 2024-07-08 RX ORDER — CLINDAMYCIN PHOSPHATE 10 MG/G
GEL TOPICAL 2 TIMES DAILY
Qty: 30 G | Refills: 1 | Status: CANCELLED | OUTPATIENT
Start: 2024-07-08

## 2024-07-08 RX ORDER — ESTRADIOL 0.1 MG/G
CREAM VAGINAL
Qty: 42.5 G | Refills: 3 | Status: CANCELLED | OUTPATIENT
Start: 2024-07-08

## 2024-07-08 RX ORDER — LEVOTHYROXINE SODIUM 88 UG/1
88 TABLET ORAL DAILY
Qty: 90 TABLET | Refills: 3 | Status: CANCELLED | OUTPATIENT
Start: 2024-07-08

## 2024-07-08 RX ORDER — LISINOPRIL 10 MG/1
10 TABLET ORAL DAILY
Qty: 90 TABLET | Refills: 1 | Status: CANCELLED | OUTPATIENT
Start: 2024-07-08

## 2024-07-09 ENCOUNTER — MYC MEDICAL ADVICE (OUTPATIENT)
Dept: FAMILY MEDICINE | Facility: CLINIC | Age: 64
End: 2024-07-09
Payer: COMMERCIAL

## 2024-07-09 NOTE — TELEPHONE ENCOUNTER
Writer responded via Opez.  RASHEED AlejandreN, RN-BC  [facility]

## 2024-08-08 ENCOUNTER — PATIENT OUTREACH (OUTPATIENT)
Dept: CARE COORDINATION | Facility: CLINIC | Age: 64
End: 2024-08-08
Payer: COMMERCIAL

## 2024-08-18 DIAGNOSIS — L70.0 ACNE VULGARIS: ICD-10-CM

## 2024-08-19 RX ORDER — CLINDAMYCIN PHOSPHATE 10 MG/G
GEL TOPICAL 2 TIMES DAILY
Qty: 30 G | Refills: 5 | Status: SHIPPED | OUTPATIENT
Start: 2024-08-19

## 2024-09-05 ENCOUNTER — PATIENT OUTREACH (OUTPATIENT)
Dept: CARE COORDINATION | Facility: CLINIC | Age: 64
End: 2024-09-05
Payer: COMMERCIAL

## 2024-10-17 ENCOUNTER — OFFICE VISIT (OUTPATIENT)
Dept: URGENT CARE | Facility: URGENT CARE | Age: 64
End: 2024-10-17
Payer: COMMERCIAL

## 2024-10-17 VITALS
DIASTOLIC BLOOD PRESSURE: 88 MMHG | RESPIRATION RATE: 14 BRPM | HEART RATE: 86 BPM | SYSTOLIC BLOOD PRESSURE: 134 MMHG | TEMPERATURE: 98 F | OXYGEN SATURATION: 98 %

## 2024-10-17 DIAGNOSIS — S61.217A LACERATION OF LEFT LITTLE FINGER WITHOUT FOREIGN BODY WITHOUT DAMAGE TO NAIL, INITIAL ENCOUNTER: Primary | ICD-10-CM

## 2024-10-17 PROCEDURE — 99207 PR NO CHARGE LOS: CPT | Performed by: FAMILY MEDICINE

## 2024-10-17 PROCEDURE — 12001 RPR S/N/AX/GEN/TRNK 2.5CM/<: CPT | Mod: F4 | Performed by: FAMILY MEDICINE

## 2024-10-17 NOTE — PATIENT INSTRUCTIONS
Call to schedule a nurse visit at one of our [place] clinic sites ( included with the cost of today's visit so long as you are seen at a [facility] primary care location).    -Recommend you have the staples/stitches taken out in 5-7 days      Change bandage/dressing once a day or if the dressing gets dirty/soaked.       Keep the wound area  moisturized with aquaphor, vaseline or bacitracin ( we do not recommend triple antibiotic)       If you develop increasing pain/swelling/redness/warmth/drainage or spike a fever return right away to be re-evaluated as this could be a sign of infection

## 2024-10-17 NOTE — PROGRESS NOTES
Assessment & Plan     Laceration of left little finger without foreign body without damage to nail, initial encounter  - REPAIR SUPERFICIAL, WOUND BODY < =2.5CM     Staff cleaned wound with chlorhexidine  1% lido used to anesthetize approx 1mL used    4-0 suture used placing FOUR interrupted sutures    Bandage /dressing applied by MA    See AVS summary for additional recommendations reviewed with patient during this visit.       20 minutes spent on the date of the encounter doing chart review, history and exam, documentation and further activities per the note not including procedural time        Franky Helton MD   [place] [facility]    Heidi Cortes is a 63 year old female who presents to clinic today for the following health issues:  Chief Complaint   Patient presents with    Urgent Care    Laceration     Patoient presents with a laceration to her left pinky finger after cutting herself accidentally with a kitchen knife.     HPI    Clean knife cut lateral side of her pinky -- came immediately to the clinic. She cleaned it at home with betadine solution   Not on blood thinners.   Patient Active Problem List    Diagnosis Date Noted    Morbid obesity (H) 02/09/2023     Priority: Medium    Prediabetes 02/09/2023     Priority: Medium    Hypertension 02/08/2023     Priority: Medium    Autoimmune gastritis 10/22/2020     Priority: Medium    History of squamous cell carcinoma 05/18/2017     Priority: Medium    Cherry angioma 11/04/2016     Priority: Medium    Hypothyroidism due to acquired atrophy of thyroid 06/24/2016     Priority: Medium    Obstructive sleep apnea 03/06/2013     Priority: Medium     Problem list name updated by automated process. Provider to review      Chronic nasal congestion 03/06/2013     Priority: Medium    Neoplasm of uncertain behavior of skin 09/30/2012     Priority: Medium    Xerosis cutis 09/30/2012     Priority: Medium    Anxiety state 07/16/2012     Priority: Medium      Problem list name updated by automated process. 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   2015 sessile serrated adenoma. Next colonoscopy 4/2018      Mixed hyperlipidemia      Priority: Medium    Allergic rhinitis      Priority: Medium     Allergic rhinitis  Problem list name updated by automated process. Provider to review      Other type of migraine      Priority: Medium     intermittent, formerly treated with Zomig  Diagnosis updated by automated process. Provider to review and confirm.      Chondromalacia of patella 04/05/2012     Priority: Medium    Dyspnea and respiratory abnormality 07/19/2004     Priority: Medium     Problem list name updated by automated process. Provider to review      Bruxism 09/04/2012     Priority: Low     Class: Chronic     Left preauricular pain         Current Outpatient Medications   Medication Sig Dispense Refill    atorvastatin (LIPITOR) 40 MG tablet Take 1 tablet (40 mg) by mouth daily 90 tablet 3    clindamycin (CLEOCIN-T) 1 % external gel APPLY TO AFFECTED AREA TWICE A DAY 30 g 5    estradiol (ESTRACE) 0.1 MG/GM vaginal cream Insert 2 grams vaginally daily for two weeks then decrease to twice a week after that 42.5 g 3    levothyroxine (SYNTHROID/LEVOTHROID) 88 MCG tablet Take 1 tablet (88 mcg) by mouth daily 90 tablet 3    lisinopril (ZESTRIL) 10 MG tablet Take 1 tablet (10 mg) by mouth daily 90 tablet 1    MAGNESIUM PO Take 1,000 mg by mouth as needed      psyllium (METAMUCIL SMOOTH TEXTURE) 28 % packet Dissolve 1 packet in 16g of water and drink daily 30 each 11    selenium 50 MCG TABS tablet Take 50 mcg by mouth as needed      strong iodine (LUGOL'S) 5 % solution Take 0.2 mLs by mouth as needed      zinc gluconate 50 MG tablet Take 50 mg by mouth as needed      order for DME Equipment being ordered: CPAP equipment: tubing, head strap, nasal pillows (size small) Madsen Respironics, and water reservoir (Patient not  taking: Reported on 10/17/2024) 1 each 0    phentermine (ADIPEX-P) 37.5 MG tablet Take 1 tablet (37.5 mg) by mouth every morning (before breakfast) (Patient not taking: Reported on 10/17/2024) 30 tablet 5     No current facility-administered medications for this visit.           Objective    /88 (BP Location: Right arm)   Pulse 86   Temp 98  F (36.7  C) (Tympanic)   Resp 14   LMP 04/01/2013   SpO2 98%   Physical Exam        2cm diagonal laceration of lateral portion of pinky finger ( left)  There is intact flexion and extension against resistance  No damage to fingernail      No results found for any visits on 10/17/24.                  The use of Dragon/[employer]ctions dictation services may have been used to construct the content in this note; any grammatical or spelling errors are non-intentional. Please contact the author of this note directly if you are in need of any clarification.

## 2024-10-20 DIAGNOSIS — I10 PRIMARY HYPERTENSION: ICD-10-CM

## 2024-10-21 ENCOUNTER — OFFICE VISIT (OUTPATIENT)
Dept: URGENT CARE | Facility: URGENT CARE | Age: 64
End: 2024-10-21
Payer: COMMERCIAL

## 2024-10-21 ENCOUNTER — TELEPHONE (OUTPATIENT)
Dept: FAMILY MEDICINE | Facility: CLINIC | Age: 64
End: 2024-10-21

## 2024-10-21 DIAGNOSIS — S61.209A OPEN WOUND OF FINGER: Primary | ICD-10-CM

## 2024-10-21 PROCEDURE — 99207 PR NO CHARGE NURSE ONLY: CPT

## 2024-10-21 RX ORDER — LISINOPRIL 10 MG/1
10 TABLET ORAL DAILY
Qty: 90 TABLET | Refills: 1 | Status: SHIPPED | OUTPATIENT
Start: 2024-10-21

## 2024-10-21 NOTE — TELEPHONE ENCOUNTER
Writer huddled with ROBB Irvin, Clinic Manager.  RN is not able to give patient a suture removal kit nor does RN place sutures/butterfly stitch.    Patient to follow up in Urgent Care, given conflicting plan of care per patient's report and what is documented in 10/17/24 Urgent Care visit note.    Writer huddled with PRASHANTH Montana, Urgent Care Medical Assistant and ROBB Patel, Urgent Care Visit Facilitator regarding plan for patient to be seen in Urgent Care and that patient is due to arrive at 1530.  Writer stated writer will call patient to review plan for patient to be seen with Urgent Care for suture evaluation/possible removal.    Writer called patient and reviewed plan to have sutures evaluated and possibly removed today in Urgent Care.  Explained RN does not place sutures.    Patient verbalized understanding and in agreement with plan.    RASHEED AlejandreN, RN-[facility]

## 2024-10-21 NOTE — PROGRESS NOTES
Pt came in for suture removal in finger, this is day 4. Pt was to come back in 5-7 days but is leaving out of town tomorrow. Per Dr Helton, pt was told she can get a suture removal kit. Pt has family who can take out stitches.   Blanca Ng, CMA

## 2024-10-21 NOTE — TELEPHONE ENCOUNTER
"Patient is scheduled today for RN visit for suture removal, which is day 4 after suture placement.  Ideally, patient should be scheduled tomorrow, 10/22/24, at the earliest.    Per 10/17/24 Urgent Care visit:    \"Call to schedule a nurse visit at one of our [facility] sites ( included with the cost of today's visit so long as you are seen at a [facility] primary care location).    -Recommend you have the staples/stitches taken out in 5-7 days\"      Left message to call back and ask to speak with an available nurse.      RASHEED AlejandreN, RN-[facility]          "

## 2024-10-21 NOTE — TELEPHONE ENCOUNTER
Patient returning call. Pt will be on a flight early tomorrow at 0630. Will be out of town for 13 days. Pt reported she discussed with  provider and he was aware it would be day 4. Per pt, UC provider told her to come on 10/21 to get stiches removed, placed with a butterfly stitch or kit for self removal. It will be up to the nurse.     Hung MALONE RN  [facility]

## 2024-12-04 DIAGNOSIS — G47.33 OBSTRUCTIVE SLEEP APNEA (ADULT) (PEDIATRIC): Primary | ICD-10-CM

## 2024-12-29 ENCOUNTER — HEALTH MAINTENANCE LETTER (OUTPATIENT)
Age: 64
End: 2024-12-29

## 2025-02-09 DIAGNOSIS — L70.0 ACNE VULGARIS: ICD-10-CM

## 2025-02-09 RX ORDER — CLINDAMYCIN PHOSPHATE 10 MG/G
GEL TOPICAL 2 TIMES DAILY
Qty: 30 G | Refills: 0 | Status: SHIPPED | OUTPATIENT
Start: 2025-02-09

## 2025-03-15 DIAGNOSIS — L70.0 ACNE VULGARIS: ICD-10-CM

## 2025-03-15 RX ORDER — CLINDAMYCIN PHOSPHATE 10 MG/G
GEL TOPICAL 2 TIMES DAILY
Qty: 30 G | Refills: 0 | Status: SHIPPED | OUTPATIENT
Start: 2025-03-15

## 2025-05-08 ENCOUNTER — PATIENT OUTREACH (OUTPATIENT)
Dept: CARE COORDINATION | Facility: CLINIC | Age: 65
End: 2025-05-08
Payer: COMMERCIAL

## 2025-06-11 DIAGNOSIS — N95.2 VAGINAL ATROPHY: ICD-10-CM

## 2025-06-12 RX ORDER — ESTRADIOL 0.1 MG/G
CREAM VAGINAL
Qty: 42.5 G | Refills: 3 | Status: SHIPPED | OUTPATIENT
Start: 2025-06-12

## 2025-06-16 DIAGNOSIS — I10 PRIMARY HYPERTENSION: ICD-10-CM

## 2025-06-17 RX ORDER — LISINOPRIL 10 MG/1
10 TABLET ORAL DAILY
Qty: 90 TABLET | Refills: 0 | Status: SHIPPED | OUTPATIENT
Start: 2025-06-17

## 2025-06-17 NOTE — TELEPHONE ENCOUNTER
Please have pt schedule yearly physical & med refills in person visit, next available  Kya Herring PA-C

## 2025-07-05 DIAGNOSIS — E03.4 HYPOTHYROIDISM DUE TO ACQUIRED ATROPHY OF THYROID: ICD-10-CM

## 2025-07-05 RX ORDER — LEVOTHYROXINE SODIUM 88 UG/1
88 TABLET ORAL DAILY
Qty: 30 TABLET | Refills: 0 | Status: SHIPPED | OUTPATIENT
Start: 2025-07-05

## 2025-07-06 ENCOUNTER — HEALTH MAINTENANCE LETTER (OUTPATIENT)
Age: 65
End: 2025-07-06

## (undated) DEVICE — GLOVE PROTEXIS BLUE W/NEU-THERA 7.5  2D73EB75

## (undated) DEVICE — SPECIMEN CONTAINER W/10% BUFFERED FORMALIN 120ML 591201

## (undated) DEVICE — PREP CHLORAPREP 26ML TINTED ORANGE  260815

## (undated) DEVICE — DAVINCI XI GRASPER ENDOWRIST PROGRASP 470093

## (undated) DEVICE — ADH SKIN CLOSURE PREMIERPRO EXOFIN 1.0ML 3470

## (undated) DEVICE — DAVINCI XI CLIP APPLIER LG HEM-O-CLIP 8MM 470230

## (undated) DEVICE — DAVINCI XI DRAPE ARM 470015

## (undated) DEVICE — DAVINCI XI DRAPE COLUMN 470341

## (undated) DEVICE — SOL WATER IRRIG 500ML BOTTLE 2F7113

## (undated) DEVICE — ESU GROUND PAD ADULT W/CORD E7507

## (undated) DEVICE — ENDO POUCH UNIVERSAL RETRIEVAL SYSTEM INZII 5MM CD003

## (undated) DEVICE — CLIP ENDO HEMO-LOC PURPLE LG 544240

## (undated) DEVICE — SYR 50ML SLIP TIP W/O NDL 309654

## (undated) DEVICE — DAVINCI XI SEAL UNIVERSAL 5-8MM 470361

## (undated) DEVICE — SU MONOCRYL 4-0 PS-2 27" UND Y426H

## (undated) DEVICE — ANTIFOG SOLUTION W/FOAM PAD 31142527

## (undated) DEVICE — DRAPE MAYO STAND 23X54 8337

## (undated) DEVICE — LINEN TOWEL PACK X5 5464

## (undated) DEVICE — GOWN XLG DISP 9545

## (undated) DEVICE — DAVINCI XI FCP BIPOLAR FENESTRATED 470205

## (undated) DEVICE — PACK LAP CHOLE CUSTOM ASC

## (undated) DEVICE — GLOVE PROTEXIS POWDER FREE SMT 7.5  2D72PT75X

## (undated) DEVICE — NDL INSUFFLATION 13GA 120MM C2201

## (undated) DEVICE — DAVINCI XI CAUTERY HOOK 470183

## (undated) RX ORDER — OXYCODONE HYDROCHLORIDE 5 MG/1
TABLET ORAL
Status: DISPENSED
Start: 2023-04-04

## (undated) RX ORDER — FENTANYL CITRATE 50 UG/ML
INJECTION, SOLUTION INTRAMUSCULAR; INTRAVENOUS
Status: DISPENSED
Start: 2023-04-04

## (undated) RX ORDER — SCOLOPAMINE TRANSDERMAL SYSTEM 1 MG/1
PATCH, EXTENDED RELEASE TRANSDERMAL
Status: DISPENSED
Start: 2023-04-04

## (undated) RX ORDER — PROPOFOL 10 MG/ML
INJECTION, EMULSION INTRAVENOUS
Status: DISPENSED
Start: 2023-04-04

## (undated) RX ORDER — GLYCOPYRROLATE 0.2 MG/ML
INJECTION INTRAMUSCULAR; INTRAVENOUS
Status: DISPENSED
Start: 2023-04-04

## (undated) RX ORDER — HYDROMORPHONE HYDROCHLORIDE 1 MG/ML
INJECTION, SOLUTION INTRAMUSCULAR; INTRAVENOUS; SUBCUTANEOUS
Status: DISPENSED
Start: 2023-04-04

## (undated) RX ORDER — ONDANSETRON 2 MG/ML
INJECTION INTRAMUSCULAR; INTRAVENOUS
Status: DISPENSED
Start: 2023-04-04

## (undated) RX ORDER — CEFAZOLIN SODIUM 2 G/50ML
SOLUTION INTRAVENOUS
Status: DISPENSED
Start: 2023-04-04

## (undated) RX ORDER — ACETAMINOPHEN 325 MG/1
TABLET ORAL
Status: DISPENSED
Start: 2023-04-04

## (undated) RX ORDER — INDOCYANINE GREEN AND WATER 25 MG
KIT INJECTION
Status: DISPENSED
Start: 2023-04-04

## (undated) RX ORDER — DEXAMETHASONE SODIUM PHOSPHATE 4 MG/ML
INJECTION, SOLUTION INTRA-ARTICULAR; INTRALESIONAL; INTRAMUSCULAR; INTRAVENOUS; SOFT TISSUE
Status: DISPENSED
Start: 2023-04-04